# Patient Record
Sex: FEMALE | Race: BLACK OR AFRICAN AMERICAN | NOT HISPANIC OR LATINO | Employment: FULL TIME | URBAN - METROPOLITAN AREA
[De-identification: names, ages, dates, MRNs, and addresses within clinical notes are randomized per-mention and may not be internally consistent; named-entity substitution may affect disease eponyms.]

---

## 2017-04-12 ENCOUNTER — HOSPITAL ENCOUNTER (EMERGENCY)
Facility: HOSPITAL | Age: 62
Discharge: HOME | End: 2017-04-12
Attending: EMERGENCY MEDICINE
Payer: COMMERCIAL

## 2017-04-12 VITALS
SYSTOLIC BLOOD PRESSURE: 175 MMHG | DIASTOLIC BLOOD PRESSURE: 97 MMHG | TEMPERATURE: 98 F | WEIGHT: 171 LBS | OXYGEN SATURATION: 96 % | HEIGHT: 62 IN | RESPIRATION RATE: 18 BRPM | BODY MASS INDEX: 31.47 KG/M2 | HEART RATE: 70 BPM

## 2017-04-12 DIAGNOSIS — R06.02 SOB (SHORTNESS OF BREATH): ICD-10-CM

## 2017-04-12 DIAGNOSIS — I10 UNCONTROLLED HYPERTENSION: ICD-10-CM

## 2017-04-12 DIAGNOSIS — R06.02 SHORTNESS OF BREATH: Primary | ICD-10-CM

## 2017-04-12 DIAGNOSIS — J18.9 ATYPICAL PNEUMONIA: ICD-10-CM

## 2017-04-12 PROBLEM — R07.9 CHEST PAIN IN ADULT: Status: ACTIVE | Noted: 2017-04-12

## 2017-04-12 LAB
ALBUMIN SERPL BCP-MCNC: 3.7 G/DL
ALP SERPL-CCNC: 60 U/L
ALT SERPL W/O P-5'-P-CCNC: 24 U/L
ANION GAP SERPL CALC-SCNC: 9 MMOL/L
AST SERPL-CCNC: 22 U/L
BASOPHILS # BLD AUTO: 0.02 K/UL
BASOPHILS NFR BLD: 0.4 %
BILIRUB SERPL-MCNC: 0.6 MG/DL
BILIRUB UR QL STRIP: NEGATIVE
BNP SERPL-MCNC: 80 PG/ML
BUN SERPL-MCNC: 19 MG/DL
CALCIUM SERPL-MCNC: 10.2 MG/DL
CHLORIDE SERPL-SCNC: 102 MMOL/L
CLARITY UR: CLEAR
CO2 SERPL-SCNC: 29 MMOL/L
COLOR UR: NORMAL
CREAT SERPL-MCNC: 1.1 MG/DL
DIFFERENTIAL METHOD: NORMAL
EOSINOPHIL # BLD AUTO: 0.1 K/UL
EOSINOPHIL NFR BLD: 2.7 %
ERYTHROCYTE [DISTWIDTH] IN BLOOD BY AUTOMATED COUNT: 13.6 %
EST. GFR  (AFRICAN AMERICAN): >60 ML/MIN/1.73 M^2
EST. GFR  (NON AFRICAN AMERICAN): 54 ML/MIN/1.73 M^2
GLUCOSE SERPL-MCNC: 81 MG/DL
GLUCOSE UR QL STRIP: NEGATIVE
HCT VFR BLD AUTO: 40.7 %
HGB BLD-MCNC: 13.5 G/DL
HGB UR QL STRIP: NEGATIVE
INR PPP: 1
KETONES UR QL STRIP: NEGATIVE
LEUKOCYTE ESTERASE UR QL STRIP: NEGATIVE
LYMPHOCYTES # BLD AUTO: 1.4 K/UL
LYMPHOCYTES NFR BLD: 28.9 %
MCH RBC QN AUTO: 29.3 PG
MCHC RBC AUTO-ENTMCNC: 33.2 %
MCV RBC AUTO: 88 FL
MICROSCOPIC COMMENT: NORMAL
MONOCYTES # BLD AUTO: 0.6 K/UL
MONOCYTES NFR BLD: 12.2 %
NEUTROPHILS # BLD AUTO: 2.7 K/UL
NEUTROPHILS NFR BLD: 55.8 %
NITRITE UR QL STRIP: NEGATIVE
PH UR STRIP: 6 [PH] (ref 5–8)
PLATELET # BLD AUTO: 256 K/UL
PMV BLD AUTO: 12 FL
POTASSIUM SERPL-SCNC: 4 MMOL/L
PROT SERPL-MCNC: 8 G/DL
PROT UR QL STRIP: NEGATIVE
PROTHROMBIN TIME: 10.6 SEC
RBC # BLD AUTO: 4.61 M/UL
RBC #/AREA URNS HPF: 1 /HPF (ref 0–4)
SODIUM SERPL-SCNC: 140 MMOL/L
SP GR UR STRIP: 1.01 (ref 1–1.03)
TROPONIN I SERPL DL<=0.01 NG/ML-MCNC: <0.006 NG/ML
URN SPEC COLLECT METH UR: NORMAL
UROBILINOGEN UR STRIP-ACNC: NEGATIVE EU/DL
WBC # BLD AUTO: 4.84 K/UL

## 2017-04-12 PROCEDURE — G0378 HOSPITAL OBSERVATION PER HR: HCPCS

## 2017-04-12 PROCEDURE — 85025 COMPLETE CBC W/AUTO DIFF WBC: CPT

## 2017-04-12 PROCEDURE — 96374 THER/PROPH/DIAG INJ IV PUSH: CPT

## 2017-04-12 PROCEDURE — 85610 PROTHROMBIN TIME: CPT

## 2017-04-12 PROCEDURE — 99284 EMERGENCY DEPT VISIT MOD MDM: CPT | Mod: 25

## 2017-04-12 PROCEDURE — 81000 URINALYSIS NONAUTO W/SCOPE: CPT

## 2017-04-12 PROCEDURE — 93005 ELECTROCARDIOGRAM TRACING: CPT

## 2017-04-12 PROCEDURE — 25500020 PHARM REV CODE 255: Performed by: EMERGENCY MEDICINE

## 2017-04-12 PROCEDURE — 25000003 PHARM REV CODE 250: Performed by: EMERGENCY MEDICINE

## 2017-04-12 PROCEDURE — 80053 COMPREHEN METABOLIC PANEL: CPT

## 2017-04-12 PROCEDURE — 84484 ASSAY OF TROPONIN QUANT: CPT

## 2017-04-12 PROCEDURE — 83880 ASSAY OF NATRIURETIC PEPTIDE: CPT

## 2017-04-12 RX ORDER — LABETALOL HYDROCHLORIDE 5 MG/ML
10 INJECTION, SOLUTION INTRAVENOUS
Status: COMPLETED | OUTPATIENT
Start: 2017-04-12 | End: 2017-04-12

## 2017-04-12 RX ORDER — LISINOPRIL 20 MG/1
40 TABLET ORAL DAILY
Status: CANCELLED | OUTPATIENT
Start: 2017-04-13

## 2017-04-12 RX ORDER — ONDANSETRON 2 MG/ML
4 INJECTION INTRAMUSCULAR; INTRAVENOUS EVERY 8 HOURS PRN
Status: CANCELLED | OUTPATIENT
Start: 2017-04-12

## 2017-04-12 RX ORDER — MORPHINE SULFATE 10 MG/ML
2 INJECTION INTRAMUSCULAR; INTRAVENOUS; SUBCUTANEOUS EVERY 4 HOURS PRN
Status: CANCELLED | OUTPATIENT
Start: 2017-04-12

## 2017-04-12 RX ORDER — ALBUTEROL SULFATE 90 UG/1
2 AEROSOL, METERED RESPIRATORY (INHALATION) EVERY 4 HOURS PRN
Qty: 1 INHALER | Refills: 0 | Status: SHIPPED | OUTPATIENT
Start: 2017-04-12 | End: 2018-01-22

## 2017-04-12 RX ORDER — METOPROLOL SUCCINATE 25 MG/1
25 TABLET, EXTENDED RELEASE ORAL DAILY
Status: CANCELLED | OUTPATIENT
Start: 2017-04-13

## 2017-04-12 RX ORDER — SODIUM CHLORIDE 0.9 % (FLUSH) 0.9 %
3 SYRINGE (ML) INJECTION EVERY 8 HOURS PRN
Status: CANCELLED | OUTPATIENT
Start: 2017-04-12

## 2017-04-12 RX ORDER — ASPIRIN 325 MG
325 TABLET ORAL DAILY
Status: CANCELLED | OUTPATIENT
Start: 2017-04-13

## 2017-04-12 RX ORDER — NAPROXEN SODIUM 220 MG/1
81 TABLET, FILM COATED ORAL DAILY
Status: DISCONTINUED | OUTPATIENT
Start: 2017-04-13 | End: 2017-04-12

## 2017-04-12 RX ORDER — AZITHROMYCIN 500 MG/1
500 TABLET, FILM COATED ORAL DAILY
Qty: 5 TABLET | Refills: 0 | Status: SHIPPED | OUTPATIENT
Start: 2017-04-12 | End: 2017-04-17

## 2017-04-12 RX ADMIN — LABETALOL HYDROCHLORIDE 10 MG: 5 INJECTION, SOLUTION INTRAVENOUS at 05:04

## 2017-04-12 RX ADMIN — IOHEXOL 75 ML: 350 INJECTION, SOLUTION INTRAVENOUS at 04:04

## 2017-04-12 NOTE — ED TRIAGE NOTES
Pt complains of Shortness of Breath (Pt. States shortness of breath began this morning. Pt. states pulse ox reading was 79% about 0130 this am. Denies any pain).  Pt reports red/pink sputum this morning when she was coughing.

## 2017-04-12 NOTE — ED PROVIDER NOTES
Encounter Date: 4/12/2017    SCRIBE #1 NOTE: I, Chris Fowler, am scribing for, and in the presence of,  Refugio Chahal MD. I have scribed the following portions of the note - Other sections scribed: HPI, ROS.       History     Chief Complaint   Patient presents with    Shortness of Breath     Pt. c/o shortness of breath that began this morning. Pt. states pulse ox reading was 79% at one point in the day. Denies any pain.      Review of patient's allergies indicates:   Allergen Reactions    Sulfa (sulfonamide antibiotics) Itching and Rash     Other reaction(s): Rash     HPI Comments: CC: SOB    HPI: This 62 y.o. female with a PMHx of HTN, arthritis, blood transfusion presents to the ED c/o acute onset SOB and coughing up blood since this morning. Pt states she awoke this morning coughing up blood and SOB for 2 hours. Pt states after this episode she had a sore throat. Pt states the coughing and SOB has resolved but the sore throat remains. Pt also states her pulse oxygen saturation was lowered to 79% and hovered in the 80% for about 1 hour. No attempted at home medication. No other symptoms reported. Pt denies nausea, vomiting, diarrhea, headache, fever, chills, chest pain, abdominal pain, or any other associated symptoms.       The history is provided by the patient.     Past Medical History:   Diagnosis Date    Arthritis     Blood transfusion     Hypertension      Past Surgical History:   Procedure Laterality Date    Breast reduction      EYE SURGERY      Lasik bilat eyes    HYSTERECTOMY      LASER LAPAROSCOPY  1988    MINI-LAPAROTOMY  1988     Family History   Problem Relation Age of Onset    Arthritis Mother     Heart disease Mother     Hypertension Mother     Heart disease Father     Hypertension Father     Stroke Father     Diabetes Sister     Hypertension Sister     Hypertension Sister     Eczema Other     Arthritis Maternal Grandmother     Heart disease Maternal Grandmother      Hypertension Maternal Grandmother     Arthritis Maternal Grandfather     Arthritis Paternal Grandmother     Heart disease Paternal Grandmother     Arthritis Paternal Grandfather     Ovarian cancer Neg Hx     Breast cancer Neg Hx     Anxiety disorder Neg Hx     Depression Neg Hx     Suicide Neg Hx      Social History   Substance Use Topics    Smoking status: Never Smoker    Smokeless tobacco: None    Alcohol use Yes      Comment: RARELY     Review of Systems   Constitutional: Negative for chills and fever.   HENT: Positive for sore throat. Negative for congestion.    Eyes: Negative for visual disturbance.   Respiratory: Positive for cough (bloody sputum) and shortness of breath.    Cardiovascular: Negative for chest pain.   Gastrointestinal: Negative for abdominal pain, diarrhea, nausea and vomiting.   Genitourinary: Negative for dysuria.   Musculoskeletal: Negative for back pain.   Skin: Negative for rash.   Neurological: Negative for weakness and headaches.   All other systems reviewed and are negative.      Physical Exam   Initial Vitals   BP Pulse Resp Temp SpO2   04/12/17 1442 04/12/17 1442 04/12/17 1442 04/12/17 1442 04/12/17 1442   220/98 67 17 97.7 °F (36.5 °C) 95 %     Physical Exam  Nursing note and vitals reviewed.  Constitutional: Well appearing middle-aged female in no obvious distress or discomfort  HENT:    Head: NC/AT    Eyes: Conjunctivae normal.   (-) scleral icterus.              Mouth/Throat: MMM.     Neck: Neck supple, normal rom.   Cardiovascular: RRR  Pulmonary/Chest: CTAB   Abdominal: Soft. ND/NT w/o guarding or rebound.  (-) CVA tenderness.  Musculoskeletal: FROM of all major joints. No extremity edema or tenderness.  Neurological: A&Ox4, Normal speech.  No focal motor deficits.  Normal gait  Skin: Skin is warm and dry.   Psychiatric: normal mood and affect.      ED Course   Procedures  Labs Reviewed   COMPREHENSIVE METABOLIC PANEL - Abnormal; Notable for the following:         Result Value    eGFR if non  54 (*)     All other components within normal limits   CBC W/ AUTO DIFFERENTIAL   PROTIME-INR   TROPONIN I   B-TYPE NATRIURETIC PEPTIDE   URINALYSIS   URINALYSIS MICROSCOPIC     EKG Readings: (Independently Interpreted)   Initial Reading: No STEMI.   Sinus rhythm with occasional PVC, rate 86, normal axis/intervals, no ST/T-wave abnormalities       X-Rays:   Independently Interpreted Readings:   Other Readings:  Chest x-ray - increased interstitial markings bilaterally which may represent interstitial infiltrates vs chronic interstitial fibrosis.    CTA chest - Negative PE study.    Medical Decision Making:   History:   I obtained history from: someone other than patient.  Old Medical Records: I decided to obtain old medical records.  Old Records Summarized: records from clinic visits and other records.  Independently Interpreted Test(s):   I have ordered and independently interpreted X-rays - see prior notes.  I have ordered and independently interpreted EKG Reading(s) - see prior notes  Clinical Tests:   Lab Tests: Ordered and Reviewed  Radiological Study: Ordered and Reviewed  Medical Tests: Ordered and Reviewed      Differential Diagnosis:   Initial differential diagnosis includes but is not limited to...URI, bronchitis, pneumonia, pneumothorax, asthma vs reactive airway disease, Pulmonary embolism, undiagnosed COPD vs CHF.     Additional Medical Decision Making:   Emergent evaluation of a 62-year-old female with history of hypertension and arthritis who presents emergency Department complaining of shortness of breath, reported hypoxia and productive cough including blood-streaked pinkish sputum since this morning.  Patient initially seen by ENT, Dr. Rosado, who had a concern for possible PE referred her to the emergency department.  Initial vitals concerning for hypertension along with an SPO2 of 95% RA.  On exam, she appears well and in no obvious distress or  discomfort.  Cardiac and respiratory exams benign.  Her abdomen is soft and nontender.  EKG without evidence of acute ischemia or dysrhythmia.  Chest x-ray without focal consolidation/pneumonia, pleural effusion or pneumothorax.  Basic labs within normal limits.  Troponin negative.  BNP less than 100.  CTA chest pending.    - CTA chest Without evidence of PE.  There is however numerous micronodular opacities bilaterally with interstitial thickening suggestive of old granulomatosis disease vs interstitial pneumonia.  Given her presenting symptoms, she has been empirically started on azithromycin.  She has been provided an albuterol inhaler to be used as needed.  She has been strongly advised follow-up with her PCP within 3-5 days for reevaluation and further management.  Return instructions discussed prior to discharge.            Scribe Attestation:   Scribe #1: I performed the above scribed service and the documentation accurately describes the services I performed. I attest to the accuracy of the note.    Attending Attestation:           Physician Attestation for Scribe:  Physician Attestation Statement for Scribe #1: I, Refugio Chahal MD, reviewed documentation, as scribed by Chris Fowler  in my presence, and it is both accurate and complete.                 ED Course     Clinical Impression:   The primary encounter diagnosis was Shortness of breath. Diagnoses of SOB (shortness of breath), Atypical pneumonia, and Uncontrolled hypertension were also pertinent to this visit.    Disposition:   Disposition: Discharged       Refugio Chahal MD  04/14/17 0006       Refugio Chahal MD  04/14/17 0008

## 2017-04-12 NOTE — ED AVS SNAPSHOT
[facility]  [address] [place] [address]               Riley Marcus   2017  3:04 PM   ED    Description:  Female : 1955   Department:  [facility]           Your Care was Coordinated By:     Provider Role From To    Refugio Chahal MD Attending Provider 17 1526 --    Jacinto Recinos PA-C Physician Assistant 17 [phone]      Reason for Visit     Shortness of Breath           Diagnoses this Visit        Comments    Shortness of breath    -  Primary     SOB (shortness of breath)         Atypical pneumonia         Uncontrolled hypertension           ED Disposition     ED Disposition Condition Comment    Discharge             To Do List           Follow-up Information     Schedule an appointment as soon as possible for a visit with Chana Sutton MD.    Specialty:  Internal Medicine    Contact information:    [address] [place] [address]  [phone]         These Medications        Disp Refills Start End    azithromycin (ZITHROMAX) 500 MG tablet 5 tablet 0 2017    Take 1 tablet (500 mg total) by mouth once daily. - Oral    Pharmacy: [facility] - [place], [place] - [address] Ph #: [phone]       albuterol 90 mcg/actuation inhaler 1 Inhaler 0 2017    Inhale 2 puffs into the lungs every 4 (four) hours as needed for Wheezing or Shortness of Breath. Rescue - Inhalation    Pharmacy: [facility] - [place], [place] - [address] Ph #: [phone]         [facility]     [facility] Nurse Care Line - 24/ Assistance  Unless otherwise directed by your provider, please contact [facility], our nurse care line that is available for 24/ assistance.     Registered nurses in the [facility] provide: appointment scheduling, clinical advisement, health education, and other advisory  services.  Call: [phone] (toll free)               Medications           Message regarding Medications     Verify the changes and/or additions to your medication regime listed below are the same as discussed with your clinician today.  If any of these changes or additions are incorrect, please notify your healthcare provider.        START taking these NEW medications        Refills    azithromycin (ZITHROMAX) 500 MG tablet 0    Sig: Take 1 tablet (500 mg total) by mouth once daily.    Class: Print    Route: Oral    albuterol 90 mcg/actuation inhaler 0    Sig: Inhale 2 puffs into the lungs every 4 (four) hours as needed for Wheezing or Shortness of Breath. Rescue    Class: Print    Route: Inhalation      These medications were administered today        Dose Freq    omnipaque 350 iohexol 75 mL 75 mL IMG once as needed    Sig: Inject 75 mLs into the vein ONCE PRN for contrast.    Class: Normal    Route: Intravenous    labetalol injection 10 mg 10 mg ED 1 Time    Sig: Inject 2 mLs (10 mg total) into the vein ED 1 Time.    Class: Normal    Route: Intravenous           Verify that the below list of medications is an accurate representation of the medications you are currently taking.  If none reported, the list may be blank. If incorrect, please contact your healthcare provider. Carry this list with you in case of emergency.           Current Medications     ascorbic acid (VITAMIN C) 500 MG tablet Take 500 mg by mouth once daily.      calcium-vitamin D3 (CALCIUM 500 + D) 500 mg(1,250mg) -200 unit per tablet Take 1 tablet by mouth 2 (two) times daily with meals.    cyanocobalamin-cobamamide (B-12 PLUS) 5,000-100 mcg Subl Place under the tongue. 1 Tablet, Sublingual Sublingual Every day    GLUC MAURICE/CHONDRO MAURICE A/VIT C/MN (GLUCOSAMINE-CHONDROIT-VIT C-MN) 163-768-52-5 mg Tab Take by mouth. 1 Tablet Oral Every day    green tea leaf extract (GREEN TEA) 315 mg Cap Take by mouth. 1 Capsule Oral Every day    lisinopril  "(PRINIVIL,ZESTRIL) 40 MG tablet Take 1 tablet (40 mg total) by mouth once daily.    metoprolol succinate (TOPROL-XL) 25 MG 24 hr tablet Take 1 tablet (25 mg total) by mouth once daily.    multivitamin with minerals tablet Take 1 tablet by mouth once daily.    omega-3 fatty acids 1,000 mg Cap Take by mouth. 1 Capsule Oral Every day    triamterene-hydrochlorothiazide 37.5-25 mg (DYAZIDE) 37.5-25 mg per capsule Take 1 capsule by mouth once daily.    VITAMIN E,DL-ALPHA TOCOPHEROL, (VITAMIN E, BULK, MISC) by Misc.(Non-Drug; Combo Route) route.    albuterol 90 mcg/actuation inhaler Inhale 2 puffs into the lungs every 4 (four) hours as needed for Wheezing or Shortness of Breath. Rescue    aspirin 81 mg Tab Take by mouth. 1 Tablet Oral Every day    azithromycin (ZITHROMAX) 500 MG tablet Take 1 tablet (500 mg total) by mouth once daily.    predniSONE (DELTASONE) 5 MG tablet 1 mg 3 times per week           Clinical Reference Information           Your Vitals Were     BP Pulse Temp Resp Height Weight    194/110 76 98.2 °F (36.8 °C) (Oral) 16 5' 2" (1.575 m) 77.6 kg (171 lb)    SpO2 BMI             94% 31.28 kg/m2         Allergies as of 4/12/2017        Reactions    Sulfa (Sulfonamide Antibiotics) Itching, Rash    Other reaction(s): Rash      Immunizations Administered on Date of Encounter - 4/12/2017     None      ED Micro, Lab, POCT     Start Ordered       Status Ordering Provider    04/12/17 1449 04/12/17 1448  CBC auto differential  STAT      Final result     04/12/17 1449 04/12/17 1448  Comprehensive metabolic panel  STAT      Final result     04/12/17 1449 04/12/17 1448  Protime-INR  STAT      Final result     04/12/17 1449 04/12/17 1448  Troponin I  STAT      Final result     04/12/17 1449 04/12/17 1448  Brain natriuretic peptide  STAT      Final result     04/12/17 1449 04/12/17 1448  Urinalysis  STAT      Final result     04/12/17 1449 04/12/17 1449  Urinalysis Microscopic  Once      Final result       ED Imaging " Orders     Start Ordered       Status Ordering Provider    04/12/17 1549 04/12/17 1549  CTA Chest Non-Coronary (PE Study)  1 time imaging      Final result     04/12/17 1511 04/12/17 1511  X-Ray Chest PA And Lateral  1 time imaging      Final result       Discharge References/Attachments     ADULT, PNEUMONIA (ENGLISH)       [facility] complies with applicable Federal civil rights laws and does not discriminate on the basis of race, color, national origin, age, disability, or sex.        Language Assistance Services     ATTENTION: Language assistance services are available, free of charge. Please call [phone].      ATENCIÓN: Si habla español, tiene a owusu disposición servicios gratuitos de asistencia lingüística. Llame al [phone].     CHÚ Ý: N?u b?n nói Ti?ng Vi?t, có các d?ch v? h? tr? ngôn ng? mi?n phí dành cho b?n. G?i s? [phone].

## 2017-04-12 NOTE — ED TRIAGE NOTES
Present to the ER with c/o SOB, reports cough since this am, reports sore throat from cough, reports low oxygen sat's while coughing and after coughing, currently denies SOB

## 2017-07-17 ENCOUNTER — LAB VISIT (OUTPATIENT)
Dept: LAB | Facility: HOSPITAL | Age: 62
End: 2017-07-17
Attending: ORTHOPAEDIC SURGERY
Payer: COMMERCIAL

## 2017-07-17 ENCOUNTER — TELEPHONE (OUTPATIENT)
Dept: FAMILY MEDICINE | Facility: CLINIC | Age: 62
End: 2017-07-17

## 2017-07-17 DIAGNOSIS — M79.674 PAIN AND SWELLING OF TOE, RIGHT: Primary | ICD-10-CM

## 2017-07-17 DIAGNOSIS — Z11.59 NEED FOR HEPATITIS C SCREENING TEST: ICD-10-CM

## 2017-07-17 DIAGNOSIS — M79.89 PAIN AND SWELLING OF TOE, RIGHT: Primary | ICD-10-CM

## 2017-07-17 LAB
ALBUMIN SERPL BCP-MCNC: 3.6 G/DL
ALP SERPL-CCNC: 73 U/L
ALT SERPL W/O P-5'-P-CCNC: 17 U/L
ANION GAP SERPL CALC-SCNC: 10 MMOL/L
AST SERPL-CCNC: 18 U/L
BILIRUB SERPL-MCNC: 0.4 MG/DL
BUN SERPL-MCNC: 20 MG/DL
CALCIUM SERPL-MCNC: 10.4 MG/DL
CHLORIDE SERPL-SCNC: 105 MMOL/L
CO2 SERPL-SCNC: 27 MMOL/L
CREAT SERPL-MCNC: 1.3 MG/DL
EST. GFR  (AFRICAN AMERICAN): 51 ML/MIN/1.73 M^2
EST. GFR  (NON AFRICAN AMERICAN): 44 ML/MIN/1.73 M^2
GLUCOSE SERPL-MCNC: 94 MG/DL
POTASSIUM SERPL-SCNC: 4.3 MMOL/L
PROT SERPL-MCNC: 7.9 G/DL
SODIUM SERPL-SCNC: 142 MMOL/L
URATE SERPL-MCNC: 6.2 MG/DL

## 2017-07-17 PROCEDURE — 84550 ASSAY OF BLOOD/URIC ACID: CPT

## 2017-07-17 PROCEDURE — 80053 COMPREHEN METABOLIC PANEL: CPT

## 2017-07-17 PROCEDURE — 86803 HEPATITIS C AB TEST: CPT

## 2017-07-17 PROCEDURE — 36415 COLL VENOUS BLD VENIPUNCTURE: CPT

## 2017-07-17 NOTE — TELEPHONE ENCOUNTER
----- Message from Tatiana Neely sent at 7/17/2017 12:41 PM CDT -----  Contact: self  [phone]  Pt si requesting a lab order for her uric acid, Pls call pt [phone]. Thanks............Madalyn

## 2017-07-18 LAB — HCV AB SERPL QL IA: NEGATIVE

## 2017-08-01 DIAGNOSIS — M79.674 PAIN IN TOE OF RIGHT FOOT: Primary | ICD-10-CM

## 2017-08-09 ENCOUNTER — HOSPITAL ENCOUNTER (OUTPATIENT)
Dept: RADIOLOGY | Facility: HOSPITAL | Age: 62
Discharge: HOME | End: 2017-08-09
Attending: ORTHOPAEDIC SURGERY
Payer: COMMERCIAL

## 2017-08-09 DIAGNOSIS — M79.674 PAIN IN TOE OF RIGHT FOOT: ICD-10-CM

## 2017-08-09 PROCEDURE — 73718 MRI LOWER EXTREMITY W/O DYE: CPT | Mod: TC,RT

## 2017-08-09 PROCEDURE — 73718 MRI LOWER EXTREMITY W/O DYE: CPT | Mod: 26,RT,, | Performed by: RADIOLOGY

## 2017-12-07 ENCOUNTER — TELEPHONE (OUTPATIENT)
Dept: OPHTHALMOLOGY | Facility: CLINIC | Age: 62
End: 2017-12-07

## 2017-12-07 NOTE — TELEPHONE ENCOUNTER
----- Message from Samantha Burnett sent at 12/7/2017 10:55 AM CST -----  Contact: [facility]  OPHTHALMOLOGY TRIAGE CALL    Date:  12/7/2017   Time:  10:55 AM  Person Calling: Emelia  Phone Number:  [phone] (home) [phone] (work)  Call received by:  Samantha Burnett  Patient in Clinic now:  No  Which eye:  Left  Name of Patient's Eye Dr.:    Date Last Seen:    Disposition of patient:the pt is experiencing blurry vision in the left eye  For How Long:for the last two days    Additional Comments:  The patient is requesting to see Dr. Givens

## 2017-12-08 ENCOUNTER — OFFICE VISIT (OUTPATIENT)
Dept: OPTOMETRY | Facility: CLINIC | Age: 62
End: 2017-12-08
Payer: COMMERCIAL

## 2017-12-08 DIAGNOSIS — I10 ESSENTIAL HYPERTENSION: ICD-10-CM

## 2017-12-08 DIAGNOSIS — H34.8320 BRANCH RETINAL VEIN OCCLUSION WITH MACULAR EDEMA OF LEFT EYE: Primary | ICD-10-CM

## 2017-12-08 DIAGNOSIS — H25.13 NUCLEAR SCLEROSIS OF BOTH EYES: ICD-10-CM

## 2017-12-08 PROCEDURE — 92004 COMPRE OPH EXAM NEW PT 1/>: CPT | Mod: S$GLB,,, | Performed by: OPTOMETRIST

## 2017-12-08 PROCEDURE — 99999 PR PBB SHADOW E&M-EST. PATIENT-LVL II: CPT | Mod: PBBFAC,,, | Performed by: OPTOMETRIST

## 2017-12-08 PROCEDURE — 92134 CPTRZ OPH DX IMG PST SGM RTA: CPT | Mod: S$GLB,,, | Performed by: OPTOMETRIST

## 2017-12-08 NOTE — PROGRESS NOTES
Subjective:       Patient ID: Riley Marcus is a 62 y.o. female      Chief Complaint   Patient presents with    Eye Problem    Hypertensive Eye Exam     History of Present Illness  Dls: ? Yrs     Pt states x 3 days notice bottom half of vision in os is blurrysome pain   1-2 off/on x 1 -2 days.  Pt states no floaters no flashes no ha's.     Eye meds:  None       Assessment/Plan:     1. Branch retinal vein occlusion with macular edema of left eye  Discussed with pt. Referral to Dr. Olson for evaluation and treatment.       - Posterior Segment OCT Retina-Both eyes  - Ambulatory referral to Ophthalmology    2. Essential hypertension  Maintain BP control.    3. Nuclear sclerosis of both eyes  NVS. Monitor.     Return for Retina consult with Dr. Olson.

## 2017-12-21 ENCOUNTER — INITIAL CONSULT (OUTPATIENT)
Dept: OPHTHALMOLOGY | Facility: CLINIC | Age: 62
End: 2017-12-21
Payer: COMMERCIAL

## 2017-12-21 DIAGNOSIS — H25.13 NUCLEAR SCLEROSIS OF BOTH EYES: ICD-10-CM

## 2017-12-21 DIAGNOSIS — H34.8320 BRANCH RETINAL VEIN OCCLUSION OF LEFT EYE WITH MACULAR EDEMA: Primary | ICD-10-CM

## 2017-12-21 PROCEDURE — 67028 INJECTION EYE DRUG: CPT | Mod: LT,S$GLB,, | Performed by: OPHTHALMOLOGY

## 2017-12-21 PROCEDURE — 92134 CPTRZ OPH DX IMG PST SGM RTA: CPT | Mod: S$GLB,,, | Performed by: OPHTHALMOLOGY

## 2017-12-21 PROCEDURE — 92014 COMPRE OPH EXAM EST PT 1/>: CPT | Mod: 25,S$GLB,, | Performed by: OPHTHALMOLOGY

## 2017-12-21 PROCEDURE — 99999 PR PBB SHADOW E&M-EST. PATIENT-LVL II: CPT | Mod: PBBFAC,,, | Performed by: OPHTHALMOLOGY

## 2017-12-21 RX ADMIN — Medication 1.25 MG: at 09:12

## 2017-12-21 NOTE — PATIENT INSTRUCTIONS
.POST INTRAVITREAL INJECTION PATIENT INSTRUCTIONS   Below are some guidelines for what to expect after your treatment and additional care instructions.   * you may experience mild discomfort in your eye after the treatment. Your eye usually feels better within 24 to 48 hours after the procedure.   * you have been given drops that numb the surface of your eye.   DO NOT rub or touch your eye, DO NOT wear contacts until numbness goes away.   * you may experience small spots that appear in your field of vision, these are usually caused by an air bubble or from the medication. It taked a few hours or days for these to go away.   * use of sunglasses will help reduce light sensitivity and glare.   * DO NOT swim or put sink water ( tap water ) or swin for at least 24 hours after the injection   * If you have a gritty, burning, irritating or stinging feeling in the injected eye. This may be a result of the antiseptic used. Use artifical tears or eye lubricant ( from over- the- counter from your local pharmacy ). If you have some at home already please check the expiration date, so not to use anything contaminated in your eye. A cool pack over your eye brow above the injected eye may also relieve discomfort.   Call us right away or go to the Emergency Department if you have a dramatic decrease in vision or extreme pain in the eye.   [facility] [phone]    .Controlling High Blood Pressure  High blood pressure (hypertension) is often called the silent killer. This is because many people who have it dont know it. High blood pressure is defined as 140/90 mm Hg or higher. Know your blood pressure and remember to check it regularly. Doing so can save your life. Here are some things you can do to help control your blood pressure.    Choose heart-healthy foods  · Select low-salt, low-fat foods. Limit sodium intake to 2,400 mg per day or the amount suggested by your healthcare provider.  · Limit canned, dried,  cured, packaged, and fast foods. These can contain a lot of salt.  · Eat 8 to 10 servings of fruits and vegetables every day.  · Choose lean meats, fish, or chicken.  · Eat whole-grain pasta, brown rice, and beans.  · Eat 2 to 3 servings of low-fat or fat-free dairy products.  · Ask your doctor about the DASH eating plan. This plan helps reduce blood pressure.  · When you go to a restaurant, ask that your meal be prepared with no added salt.  Maintain a healthy weight  · Ask your healthcare provider how many calories to eat a day. Then stick to that number.  · Ask your healthcare provider what weight range is healthiest for you. If you are overweight, a weight loss of only 3% to 5% of your body weight can help lower blood pressure. Generally, a good weight loss goal is to lose 10% of your body weight in a year.  · Limit snacks and sweets.  · Get regular exercise.  Get up and get active  · Choose activities you enjoy. Find ones you can do with friends or family. This includes bicycling, dancing, walking, and jogging.  · Park farther away from building entrances.  · Use stairs instead of the elevator.  · When you can, walk or bike instead of driving.  · [place] leaves, garden, or do household repairs.  · Be active at a moderate to vigorous level of physical activity for at least 40 minutes for a minimum of 3 to 4 days a week.   Manage stress  · Make time to relax and enjoy life. Find time to laugh.  · Communicate your concerns with your loved ones and your healthcare provider.  · Visit with family and friends, and keep up with hobbies.  Limit alcohol and quit smoking  · Men should have no more than 2 drinks per day.  · Women should have no more than 1 drink per day.  · Talk with your healthcare provider about quitting smoking. Smoking significantly increases your risk for heart disease and stroke. Ask your healthcare provider about community smoking cessation programs and other options.  Medicines  If lifestyle changes  arent enough, your healthcare provider may prescribe high blood pressure medicine. Take all medicines as prescribed. If you have any questions about your medicines, ask your healthcare provider before stopping or changing them.    © 2150-7539 [employer]. [address] [place] [address], [place] [address]. All rights reserved. This information is not intended as a substitute for professional medical care. Always follow your healthcare professional's instructions.

## 2017-12-21 NOTE — LETTER
December 21, 2017      Jamilah Bloom, OD  [address]  [facility] [address]           [facility]  [address]  [facility] [address]  Phone: [phone]  Fax: [phone]          Patient: Riley Marcus   MR Number: [id number]   YOB: 1955   Date of Visit: 12/21/2017       Dear Dr. Jamilah Bloom:    Thank you for referring Riley Marcus to me for evaluation. Attached you will find relevant portions of my assessment and plan of care.    If you have questions, please do not hesitate to call me. I look forward to following Riley Marcus along with you.    Sincerely,    Harpreet Olson MD    Enclosure  CC:  No Recipients    If you would like to receive this communication electronically, please contact [email or web address] or [phone] to request more information on [employer] access.    For providers and/or their staff who would like to refer a patient to [facility], please contact us through our one-stop-shop provider referral line, [facility], at [phone].    If you feel you have received this communication in error or would no longer like to receive these types of communications, please e-mail [email or web address]

## 2017-12-21 NOTE — PROGRESS NOTES
HPI     DLS 12/08/17  By Dr. MATTHEW LOWE, OD    61 Y/O F presents today per referral by Dr. LOWE, OD for BRVO w/ ME OS. PT   c/o blurred vision and a few floaters. Onset x 12/04/1, stable since   onset.  No flashes OU.  Va normal OD.  No symptoms OD.  No pain OU.    -eyepain  -headache x 1 day  +light flashes/floaters      Last edited by Harpreet Olson MD on 12/21/2017  8:55 AM. (History)        [facility] SDOCT:   OD: good quality, good contour  OS: good quality, superior IRF/ME/thickening      Assessment /Plan     For exam results, see Encounter Report.    Branch retinal vein occlusion of left eye with macular edema  -     Cancel: OCT- Retina  -     Posterior Segment OCT Retina-Both eyes    Nuclear sclerosis of both eyes      Recommend Avastin OS.  RBA discussed.  Monthly inj discussed.  Pt agrees.    Risks, benefits, and alternatives to treatment were discussed in detail with the patient, including bleeding/infection (endophthalmitis)/etc.  The patient voiced understanding and wished to proceed with the procedure.  See separate consent form.    Injection Procedure Note:  Diagnosis: HRVO with ME Left Eye    Topical Proparacaine drop placed then topical 10% Betadine swabs to lids, lashes, and conjunctiva.  Sterile gloves used, and sterile lid speculum placed.  10% Betadine swabbed at injection site again prior to injection.  Avastin 1.25mg in 0.05cc Injected at 5:00 position 3.5-4mm posterior to the limbus.  Complications: None  Va at least CF at 5 feet post injection.  Retina, ONH, IOP normal after injection.    Return to clinic in 1 month for reevaluation, OCT, and possible injection depending upon findings.  Patient should return immediately PRN.  Retinal Detachment and Endophthalmitis precautions given.

## 2018-01-22 ENCOUNTER — PROCEDURE VISIT (OUTPATIENT)
Dept: OPHTHALMOLOGY | Facility: CLINIC | Age: 63
End: 2018-01-22
Payer: COMMERCIAL

## 2018-01-22 VITALS — DIASTOLIC BLOOD PRESSURE: 88 MMHG | SYSTOLIC BLOOD PRESSURE: 151 MMHG | HEART RATE: 67 BPM

## 2018-01-22 DIAGNOSIS — H34.8320 BRANCH RETINAL VEIN OCCLUSION OF LEFT EYE WITH MACULAR EDEMA: Primary | ICD-10-CM

## 2018-01-22 PROCEDURE — 99499 UNLISTED E&M SERVICE: CPT | Mod: S$GLB,,, | Performed by: OPHTHALMOLOGY

## 2018-01-22 PROCEDURE — 92134 CPTRZ OPH DX IMG PST SGM RTA: CPT | Mod: S$GLB,,, | Performed by: OPHTHALMOLOGY

## 2018-01-22 PROCEDURE — 67028 INJECTION EYE DRUG: CPT | Mod: LT,S$GLB,, | Performed by: OPHTHALMOLOGY

## 2018-01-22 RX ADMIN — Medication 1.25 MG: at 11:01

## 2018-01-22 NOTE — PROGRESS NOTES
HPI     1 mo BRVO f/u / OCT / Avastin   DLS-12/21/2017 Dr. Olson    Pt sts va has improved since last visit denies pain just stinging in eyes   occasionally thinks due to allergies.   (-)Flashes (+)Floaters  (-)Photophobia  (-)Glare    At's PRN      Branch retinal vein occlusion of left eye with macular edema  Avastin x1 12/21/2017 OS      Nuclear sclerosis of both eyes    Last edited by Amanda Vega on 1/22/2018  9:34 AM. (History)              [place] SDOCT:   OD: good quality, good contour, stable from Dec scan  OS: good quality, superior IRF/ME/thickening much improved since Dec.  Only min cystic change persists.  Foveal contour has returned.     SRF resolved    Assessment /Plan     For exam results, see Encounter Report.    Branch retinal vein occlusion of left eye with macular edema   improve about a month after Avastin inj      Recommend continue Avastin OS.  RBA discussed.  Monthly inj discussed.  Pt agrees.    Risks, benefits, and alternatives to treatment were discussed in detail with the patient, including bleeding/infection (endophthalmitis)/etc.  The patient voiced understanding and wished to proceed with the procedure.  See separate consent form.    Injection Procedure Note:  Diagnosis: HRVO with ME Left Eye    Topical Proparacaine drop placed then topical 10% Betadine swabs to lids, lashes, and conjunctiva.  Sterile gloves used, and sterile lid speculum placed.  10% Betadine swabbed at injection site again prior to injection.  Avastin 1.25mg in 0.05cc Injected at 5:00 position 3.5-4mm posterior to the limbus.  Complications: None  Va at least CF at 5 feet post injection.  Retina, ONH, IOP normal after injection.    Return to clinic in 1 month for reevaluation, OCT, and possible injection depending upon findings.  Patient should return immediately PRN.  Retinal Detachment and Endophthalmitis precautions given.

## 2018-01-22 NOTE — PATIENT INSTRUCTIONS
POST INTRAVITREAL INJECTION PATIENT INSTRUCTIONS   Below are some guidelines for what to expect after your treatment and additional care instructions.    You may experience mild discomfort in your eye after the treatment. Usually your eye feels better within 24 to 48 hours after the procedure.      You have been given drops that numb the surface of your eye. DO NOT rub or touch your eye. DO NOT wear contacts until numbness goes away.      You may experience small spots that appear in your field of vision. These are usually caused by an air bubble or from the medication. It takes a few hours or days for these to go away.      The use of sunglasses will help reduce light sensitivity and glare.      DO NOT swim or put sink water (tap water) in your eyes for at least 4 hours after the injection.      You may get a gritty, burning, irritating or stinging feeling in the injected eye as a result of the antiseptic used. Use artificial tears or eye lubricant to reduce the sensation. These are available over-the-counter from your local pharmacy. If you already have some at home, be sure to check the expiration date and to avoid contaminating your eye. A cool pack over your eye brow above the injected eye may also relieve discomfort.     Call us right away or go to the Emergency Department if you have a dramatic decrease in vision or extreme pain in the eye.   [facility] [phone]   Item: [id number]   Revised: 09/2015

## 2018-02-23 ENCOUNTER — TELEPHONE (OUTPATIENT)
Dept: OPHTHALMOLOGY | Facility: CLINIC | Age: 63
End: 2018-02-23

## 2018-02-26 ENCOUNTER — PROCEDURE VISIT (OUTPATIENT)
Dept: OPHTHALMOLOGY | Facility: CLINIC | Age: 63
End: 2018-02-26
Payer: COMMERCIAL

## 2018-02-26 VITALS — SYSTOLIC BLOOD PRESSURE: 129 MMHG | HEART RATE: 74 BPM | DIASTOLIC BLOOD PRESSURE: 81 MMHG

## 2018-02-26 DIAGNOSIS — H34.8320 BRANCH RETINAL VEIN OCCLUSION OF LEFT EYE WITH MACULAR EDEMA: ICD-10-CM

## 2018-02-26 PROCEDURE — 92134 CPTRZ OPH DX IMG PST SGM RTA: CPT | Mod: S$GLB,,, | Performed by: OPHTHALMOLOGY

## 2018-02-26 PROCEDURE — 67028 INJECTION EYE DRUG: CPT | Mod: LT,S$GLB,, | Performed by: OPHTHALMOLOGY

## 2018-02-26 PROCEDURE — 99499 UNLISTED E&M SERVICE: CPT | Mod: S$GLB,,, | Performed by: OPHTHALMOLOGY

## 2018-02-26 RX ADMIN — Medication 1.25 MG: at 09:02

## 2018-02-26 NOTE — PATIENT INSTRUCTIONS
POST INTRAVITREAL INJECTION PATIENT INSTRUCTIONS   Below are some guidelines for what to expect after your treatment and additional care instructions.    You may experience mild discomfort in your eye after the treatment. Usually your eye feels better within 24 to 48 hours after the procedure.      You have been given drops that numb the surface of your eye. DO NOT rub or touch your eye. DO NOT wear contacts until numbness goes away.      You may experience small spots that appear in your field of vision. These are usually caused by an air bubble or from the medication. It takes a few hours or days for these to go away.      The use of sunglasses will help reduce light sensitivity and glare.      DO NOT swim or put sink water (tap water) in your eyes for at least 4 hours after the injection.      You may get a gritty, burning, irritating or stinging feeling in the injected eye as a result of the antiseptic used. Use artificial tears or eye lubricant to reduce the sensation. These are available over-the-counter from your local pharmacy. If you already have some at home, be sure to check the expiration date and to avoid contaminating your eye. A cool pack over your eye brow above the injected eye may also relieve discomfort.     Call us right away or go to the Emergency Department if you have a dramatic decrease in vision or extreme pain in the eye.   [facility] [phone]   Item: [id number]   Revised: 09/2015

## 2018-02-26 NOTE — PROGRESS NOTES
HPI     DLS 01/22/18    63 Y/O F here today for here 1 mo BRVO OS w/ ME ck after   Avastin injection left eye. PT reports: some soreness after injection x 1   wk, but better now. I have been taking Ibuprofen for Arthritis. stj        POHx:   S/P Avastin OS x 1 (01/22/18)  1. BRVO OS w/ ME     Last edited by Sue Yanez MA on 2/26/2018  8:56 AM. (History)          [place] SDOCT:   OD: good quality, good contour, stable from Dec scan  OS: good quality, superior IRF/ME/thickening much improved since Dec, min improved since 1/22/18.  No fluid today    Assessment /Plan     For exam results, see Encounter Report.    Branch retinal vein occlusion of left eye with macular edema  No fluid today 5 wks after Avastin #2      Recommend continue Avastin OS.  RBA discussed.  Monthly inj discussed.  Pt agrees.    Risks, benefits, and alternatives to treatment were discussed in detail with the patient, including bleeding/infection (endophthalmitis)/etc.  The patient voiced understanding and wished to proceed with the procedure.  See separate consent form.    Injection Procedure Note:  Diagnosis: HRVO with ME Left Eye    Topical Proparacaine drop placed then topical 10% Betadine swabs to lids, lashes, and conjunctiva.  Sterile gloves used, and sterile lid speculum placed.  10% Betadine swabbed at injection site again prior to injection.  Avastin 1.25mg in 0.05cc Injected at 5:00 position 3.5-4mm posterior to the limbus.  Complications: None  Va at least CF at 5 feet post injection.  Retina, ONH, IOP normal after injection.    Return to clinic in 6 wks for reevaluation, OCT, and possible injection depending upon findings.  Patient should return immediately PRN.  Retinal Detachment and Endophthalmitis precautions given.

## 2018-04-09 ENCOUNTER — PROCEDURE VISIT (OUTPATIENT)
Dept: OPHTHALMOLOGY | Facility: CLINIC | Age: 63
End: 2018-04-09
Payer: COMMERCIAL

## 2018-04-09 VITALS — HEART RATE: 89 BPM | SYSTOLIC BLOOD PRESSURE: 158 MMHG | DIASTOLIC BLOOD PRESSURE: 98 MMHG

## 2018-04-09 DIAGNOSIS — H34.8320 BRANCH RETINAL VEIN OCCLUSION OF LEFT EYE WITH MACULAR EDEMA: ICD-10-CM

## 2018-04-09 PROCEDURE — 67028 INJECTION EYE DRUG: CPT | Mod: LT,S$GLB,, | Performed by: OPHTHALMOLOGY

## 2018-04-09 PROCEDURE — 92134 CPTRZ OPH DX IMG PST SGM RTA: CPT | Mod: S$GLB,,, | Performed by: OPHTHALMOLOGY

## 2018-04-09 PROCEDURE — 99499 UNLISTED E&M SERVICE: CPT | Mod: S$GLB,,, | Performed by: OPHTHALMOLOGY

## 2018-04-09 RX ADMIN — Medication 1.25 MG: at 10:04

## 2018-04-09 NOTE — PROGRESS NOTES
HPI     BRVO 6 wk  / OCT/ Avastin  DLS- 02/26/2018 Dr. Olson    Pt sts vision improved since last visit.   Denies pain   (-)Flashes (+)Floaters only after injections   (-)Photophobia  (-)Glare      AT's PRN     POHx:   S/P Avastin OS x 2 (02/26/2018)  1. BRVO OS w/ ME     Last edited by Amanda Vega on 4/9/2018  9:14 AM. (History)          [place] SDOCT:   OD: good quality, good contour, stable from 2/26/18 scan  OS: good quality, superior IRF/ME/thickening much improved since Dec, stable since 2/26/18 except for tiny cystic spaces nasal to fovea.  No thickening today    Assessment /Plan     For exam results, see Encounter Report.    Branch retinal vein occlusion of left eye with macular edema  TIny IRF today 6 wks after Avastin #3      Recommend continue Avastin OS.  RBA discussed.  Pt agrees.    Risks, benefits, and alternatives to treatment were discussed in detail with the patient, including bleeding/infection (endophthalmitis)/etc.  The patient voiced understanding and wished to proceed with the procedure.  See separate consent form.    Injection Procedure Note:  Diagnosis: HRVO with ME Left Eye    Topical Proparacaine drop placed then topical 10% Betadine swabs to lids, lashes, and conjunctiva.  Sterile gloves used, and sterile lid speculum placed.  10% Betadine swabbed at injection site again prior to injection.  Avastin 1.25mg in 0.05cc Injected at 5:00 position 3.5-4mm posterior to the limbus.  Complications: None  Va at least CF at 5 feet post injection.  Retina, ONH, IOP normal after injection.    Return to clinic in 6 wks for reevaluation, OCT, and possible injection depending upon findings.  Patient should return immediately PRN.  Retinal Detachment and Endophthalmitis precautions given.

## 2018-04-09 NOTE — PATIENT INSTRUCTIONS
POST INTRAVITREAL INJECTION PATIENT INSTRUCTIONS   Below are some guidelines for what to expect after your treatment and additional care instructions.    You may experience mild discomfort in your eye after the treatment. Usually your eye feels better within 24 to 48 hours after the procedure.      You have been given drops that numb the surface of your eye. DO NOT rub or touch your eye. DO NOT wear contacts until numbness goes away.      You may experience small spots that appear in your field of vision. These are usually caused by an air bubble or from the medication. It takes a few hours or days for these to go away.      The use of sunglasses will help reduce light sensitivity and glare.      DO NOT swim or put sink water (tap water) in your eyes for at least 4 hours after the injection.      You may get a gritty, burning, irritating or stinging feeling in the injected eye as a result of the antiseptic used. Use artificial tears or eye lubricant to reduce the sensation. These are available over-the-counter from your local pharmacy. If you already have some at home, be sure to check the expiration date and to avoid contaminating your eye. A cool pack over your eye brow above the injected eye may also relieve discomfort.     Call us right away or go to the Emergency Department if you have a dramatic decrease in vision or extreme pain in the eye.   [facility] [phone]   Item: [id number]   Revised: 09/2015

## 2018-04-23 ENCOUNTER — OFFICE VISIT (OUTPATIENT)
Dept: CARDIOLOGY | Facility: CLINIC | Age: 63
End: 2018-04-23
Payer: COMMERCIAL

## 2018-04-23 VITALS
OXYGEN SATURATION: 99 % | HEART RATE: 68 BPM | DIASTOLIC BLOOD PRESSURE: 108 MMHG | HEIGHT: 62 IN | WEIGHT: 177.94 LBS | BODY MASS INDEX: 32.74 KG/M2 | SYSTOLIC BLOOD PRESSURE: 193 MMHG

## 2018-04-23 DIAGNOSIS — I49.3 FREQUENT PVCS: ICD-10-CM

## 2018-04-23 DIAGNOSIS — I10 ESSENTIAL HYPERTENSION: Primary | ICD-10-CM

## 2018-04-23 DIAGNOSIS — R07.9 CHEST PAIN IN ADULT: ICD-10-CM

## 2018-04-23 DIAGNOSIS — I10 HYPERTENSION: ICD-10-CM

## 2018-04-23 DIAGNOSIS — R06.02 SHORTNESS OF BREATH: ICD-10-CM

## 2018-04-23 PROCEDURE — 99214 OFFICE O/P EST MOD 30 MIN: CPT | Mod: S$GLB,,, | Performed by: INTERNAL MEDICINE

## 2018-04-23 PROCEDURE — 99999 PR PBB SHADOW E&M-EST. PATIENT-LVL III: CPT | Mod: PBBFAC,,, | Performed by: INTERNAL MEDICINE

## 2018-04-23 PROCEDURE — 93010 ELECTROCARDIOGRAM REPORT: CPT | Mod: S$GLB,,, | Performed by: INTERNAL MEDICINE

## 2018-04-23 PROCEDURE — 3080F DIAST BP >= 90 MM HG: CPT | Mod: CPTII,S$GLB,, | Performed by: INTERNAL MEDICINE

## 2018-04-23 PROCEDURE — 3077F SYST BP >= 140 MM HG: CPT | Mod: CPTII,S$GLB,, | Performed by: INTERNAL MEDICINE

## 2018-04-23 RX ORDER — LISINOPRIL 40 MG/1
40 TABLET ORAL DAILY
Qty: 90 TABLET | Refills: 3 | Status: SHIPPED | OUTPATIENT
Start: 2018-04-23 | End: 2019-06-04 | Stop reason: SDUPTHER

## 2018-04-23 NOTE — PROGRESS NOTES
Subjective:    Patient ID:  Riley Marcus is a 63 y.o. female who presents for follow-up of Hypertension      HPI     Last saw me 11/2016    HTN, palpitations - frequent PVCs    Stress test 6/25/15  1. The EKG portion of this study is negative for ischemia at a moderate workload, and peak heart rate of 171 bpm (111% of predicted).   2. Blood pressure response to exercise was normal (Presenting BP: 147/95 Peak BP: 206/101).   3. No significant arrhythmias were present.      Echo 6/25/15  1 - Mildly depressed left ventricular systolic function (EF 45-50%).      Holter 6/25/15  1. Sinus rhythm with heart rates varying between 53 and 146 bpm with an average of 90 bpm.     VENTRICULAR ARRHYTHMIAS  1. There were very frequent PVCs totalling 46798 and averaging 476 per hour. There were 8 couplets.    2. There were no episodes of ventricular tachycardia.    SUPRA VENTRICULAR ARRHYTHMIAS  1. There were very rare PACs recorded totalling 1 and averaging less than 1 per hour.     2. There were no episodes of sustained supraventricular tachycardia.    SINUS NODE FUNCTION  1. There was no evidence of high grade SA fr[place] block.     AV CONDUCTION  1. There was no evidence of high grade AV block    BP elevated today - ran out of lisinopril 1 week ago  Denies CP or SOB  Occasional palpitations  EKG NSR - ok    Review of Systems   Constitution: Negative for decreased appetite.   HENT: Negative for ear discharge.    Eyes: Negative for blurred vision.   Respiratory: Negative for hemoptysis.    Endocrine: Negative for polyphagia.   Hematologic/Lymphatic: Negative for adenopathy.   Skin: Negative for color change.   Musculoskeletal: Negative for joint swelling.   Neurological: Negative for brief paralysis.   Psychiatric/Behavioral: Negative for hallucinations.        Objective:    Physical Exam   Constitutional: She is oriented to person, place, and time. She appears well-developed and well-nourished.   HENT:   Head: Normocephalic and  atraumatic.   Eyes: Conjunctivae are normal. Pupils are equal, round, and reactive to light.   Neck: Normal range of motion. Neck supple.   Cardiovascular: Normal rate, normal heart sounds and intact distal pulses.    Pulmonary/Chest: Effort normal and breath sounds normal.   Abdominal: Soft. Bowel sounds are normal.   Musculoskeletal: Normal range of motion.   Neurological: She is alert and oriented to person, place, and time.   Skin: Skin is warm and dry.         Assessment:       1. Essential hypertension    2. Frequent PVCs    3. Chest pain in adult    4. Shortness of breath         Plan:       Resume BP medication - if still not controlled could add norvasc  Echo and holter

## 2018-04-26 RX ORDER — METOPROLOL SUCCINATE 25 MG/1
25 TABLET, EXTENDED RELEASE ORAL DAILY
Qty: 90 TABLET | Refills: 3 | Status: SHIPPED | OUTPATIENT
Start: 2018-04-26 | End: 2019-06-04 | Stop reason: SDUPTHER

## 2018-04-26 RX ORDER — TRIAMTERENE AND HYDROCHLOROTHIAZIDE 37.5; 25 MG/1; MG/1
1 CAPSULE ORAL DAILY
Qty: 90 CAPSULE | Refills: 3 | Status: SHIPPED | OUTPATIENT
Start: 2018-04-26 | End: 2019-06-04 | Stop reason: SDUPTHER

## 2018-04-27 ENCOUNTER — HOSPITAL ENCOUNTER (OUTPATIENT)
Dept: CARDIOLOGY | Facility: HOSPITAL | Age: 63
Discharge: HOME | End: 2018-04-27
Attending: INTERNAL MEDICINE
Payer: COMMERCIAL

## 2018-04-27 DIAGNOSIS — R06.02 SHORTNESS OF BREATH: ICD-10-CM

## 2018-04-27 DIAGNOSIS — I10 ESSENTIAL HYPERTENSION: ICD-10-CM

## 2018-04-27 DIAGNOSIS — I49.3 FREQUENT PVCS: ICD-10-CM

## 2018-04-27 DIAGNOSIS — R07.9 CHEST PAIN IN ADULT: ICD-10-CM

## 2018-04-27 LAB
MITRAL VALVE REGURGITATION: NORMAL
RETIRED EF AND QEF - SEE NOTES: 55 (ref 55–65)

## 2018-04-27 PROCEDURE — 93226 XTRNL ECG REC<48 HR SCAN A/R: CPT

## 2018-04-27 PROCEDURE — 93227 XTRNL ECG REC<48 HR R&I: CPT | Mod: ,,, | Performed by: INTERNAL MEDICINE

## 2018-04-27 PROCEDURE — 93306 TTE W/DOPPLER COMPLETE: CPT

## 2018-04-27 PROCEDURE — 93306 TTE W/DOPPLER COMPLETE: CPT | Mod: 26,,, | Performed by: INTERNAL MEDICINE

## 2018-05-28 ENCOUNTER — PROCEDURE VISIT (OUTPATIENT)
Dept: OPHTHALMOLOGY | Facility: CLINIC | Age: 63
End: 2018-05-28
Payer: COMMERCIAL

## 2018-05-28 VITALS — HEART RATE: 61 BPM | DIASTOLIC BLOOD PRESSURE: 86 MMHG | SYSTOLIC BLOOD PRESSURE: 163 MMHG

## 2018-05-28 DIAGNOSIS — H34.8320 BRANCH RETINAL VEIN OCCLUSION OF LEFT EYE WITH MACULAR EDEMA: ICD-10-CM

## 2018-05-28 PROCEDURE — 67028 INJECTION EYE DRUG: CPT | Mod: LT,S$GLB,, | Performed by: OPHTHALMOLOGY

## 2018-05-28 PROCEDURE — 92134 CPTRZ OPH DX IMG PST SGM RTA: CPT | Mod: S$GLB,,, | Performed by: OPHTHALMOLOGY

## 2018-05-28 PROCEDURE — 99499 UNLISTED E&M SERVICE: CPT | Mod: S$GLB,,, | Performed by: OPHTHALMOLOGY

## 2018-05-28 RX ADMIN — Medication 1.25 MG: at 10:05

## 2018-05-28 NOTE — PATIENT INSTRUCTIONS
POST INTRAVITREAL INJECTION PATIENT INSTRUCTIONS   Below are some guidelines for what to expect after your treatment and additional care instructions.    You may experience mild discomfort in your eye after the treatment. Usually your eye feels better within 24 to 48 hours after the procedure.      You have been given drops that numb the surface of your eye. DO NOT rub or touch your eye. DO NOT wear contacts until numbness goes away.      You may experience small spots that appear in your field of vision. These are usually caused by an air bubble or from the medication. It takes a few hours or days for these to go away.      The use of sunglasses will help reduce light sensitivity and glare.      DO NOT swim or put sink water (tap water) in your eyes for at least 4 hours after the injection.      You may get a gritty, burning, irritating or stinging feeling in the injected eye as a result of the antiseptic used. Use artificial tears or eye lubricant to reduce the sensation. These are available over-the-counter from your local pharmacy. If you already have some at home, be sure to check the expiration date and to avoid contaminating your eye. A cool pack over your eye brow above the injected eye may also relieve discomfort.     Call us right away or go to the Emergency Department if you have a dramatic decrease in vision or extreme pain in the eye.   [facility] [phone]   Item: [id number]   Revised: 09/2015       Controlling High Blood Pressure  High blood pressure (hypertension) is often called the silent killer. This is because many people who have it dont know it. High blood pressure is defined as 140/90 mm Hg or higher. Know your blood pressure and remember to check it regularly. Doing so can save your life. Here are some things you can do to help control your blood pressure.    Choose heart-healthy foods  · Select low-salt, low-fat foods. Limit sodium intake to 2,400 mg per day or the  amount suggested by your healthcare provider.  · Limit canned, dried, cured, packaged, and fast foods. These can contain a lot of salt.  · Eat 8 to 10 servings of fruits and vegetables every day.  · Choose lean meats, fish, or chicken.  · Eat whole-grain pasta, brown rice, and beans.  · Eat 2 to 3 servings of low-fat or fat-free dairy products.  · Ask your doctor about the DASH eating plan. This plan helps reduce blood pressure.  · When you go to a restaurant, ask that your meal be prepared with no added salt.  Maintain a healthy weight  · Ask your healthcare provider how many calories to eat a day. Then stick to that number.  · Ask your healthcare provider what weight range is healthiest for you. If you are overweight, a weight loss of only 3% to 5% of your body weight can help lower blood pressure. Generally, a good weight loss goal is to lose 10% of your body weight in a year.  · Limit snacks and sweets.  · Get regular exercise.  Get up and get active  · Choose activities you enjoy. Find ones you can do with friends or family. This includes bicycling, dancing, walking, and jogging.  · Park farther away from building entrances.  · Use stairs instead of the elevator.  · When you can, walk or bike instead of driving.  · [place] leaves, garden, or do household repairs.  · Be active at a moderate to vigorous level of physical activity for at least 40 minutes for a minimum of 3 to 4 days a week.   Manage stress  · Make time to relax and enjoy life. Find time to laugh.  · Communicate your concerns with your loved ones and your healthcare provider.  · Visit with family and friends, and keep up with hobbies.  Limit alcohol and quit smoking  · Men should have no more than 2 drinks per day.  · Women should have no more than 1 drink per day.  · Talk with your healthcare provider about quitting smoking. Smoking significantly increases your risk for heart disease and stroke. Ask your healthcare provider about community smoking  cessation programs and other options.  Medicines  If lifestyle changes arent enough, your healthcare provider may prescribe high blood pressure medicine. Take all medicines as prescribed. If you have any questions about your medicines, ask your healthcare provider before stopping or changing them.

## 2018-05-28 NOTE — PROGRESS NOTES
HPI     DLS  04/09/18   62 Y/O F here today for her 6 wk BRVO OS w/ ME. PT   states' after last injection my eye (Left eye) lid was swollen. Went away   by end of the day.  Fine by next day    Va good OU.  No pain/redness/f/f    AT's PRN     POHx:   S/P Avastin OS x 3 (02/26/2018)   1. BRVO OS w/ ME    Last edited by Harpreet Olson MD on 5/28/2018  9:21 AM. (History)        [place] SDOCT:   OD: good quality, good contour, stable from 4/9/18 scan  OS: good quality, No ME, cystic spaces gone, improved since 4/9/18 scan      Assessment /Plan     For exam results, see Encounter Report.    Branch retinal vein occlusion of left eye with macular edema  -     Cancel: OCT- Retina  -     Posterior Segment OCT Retina-Both eyes  -     Posterior Segment OCT Retina-Both eyes; Future  -     Prior Authorization Order    Other orders  -     bevacizumab (AVASTIN) 2.5 mg/0.10 mL 1.25 mg; Inject 0.05 mLs (1.25 mg total) into the eye one time.      ME controlled today  7 wks s/p Avastin  #4    Discussed Rx and extend vs observe  Rec injection today and f/u in 8-9 wks  Pt agrees    Risks, benefits, and alternatives to treatment were discussed in detail with the patient, including bleeding/infection (endophthalmitis)/etc.  The patient voiced understanding and wished to proceed with the procedure.  See separate consent form.    Injection Procedure Note:  Diagnosis: HRVO with ME Left Eye    Topical Proparacaine drop placed then topical 10% Betadine swabs to lids, lashes, and conjunctiva.  Sterile gloves used, and sterile lid speculum placed.  10% Betadine swabbed at injection site again prior to injection.  Avastin 1.25mg in 0.05cc Injected at 5:00 position 3.5-4mm posterior to the limbus.  Complications: None  Va at least CF at 5 feet post injection.  Retina, ONH, IOP normal after injection.    Return to clinic in 8 weeks for reevaluation, OCT, and possible injection depending upon findings.  Patient should return immediately PRN.   Retinal Detachment and Endophthalmitis precautions given.

## 2018-07-23 ENCOUNTER — OFFICE VISIT (OUTPATIENT)
Dept: OPHTHALMOLOGY | Facility: CLINIC | Age: 63
End: 2018-07-23
Payer: COMMERCIAL

## 2018-07-23 VITALS — DIASTOLIC BLOOD PRESSURE: 86 MMHG | SYSTOLIC BLOOD PRESSURE: 135 MMHG | HEART RATE: 73 BPM

## 2018-07-23 DIAGNOSIS — H25.13 NUCLEAR SCLEROSIS OF BOTH EYES: ICD-10-CM

## 2018-07-23 DIAGNOSIS — H34.8320 BRANCH RETINAL VEIN OCCLUSION OF LEFT EYE WITH MACULAR EDEMA: Primary | ICD-10-CM

## 2018-07-23 PROCEDURE — 92226 PR SPECIAL EYE EXAM, SUBSEQUENT: CPT | Mod: LT,S$GLB,, | Performed by: OPHTHALMOLOGY

## 2018-07-23 PROCEDURE — 92014 COMPRE OPH EXAM EST PT 1/>: CPT | Mod: S$GLB,,, | Performed by: OPHTHALMOLOGY

## 2018-07-23 PROCEDURE — 92134 CPTRZ OPH DX IMG PST SGM RTA: CPT | Mod: S$GLB,,, | Performed by: OPHTHALMOLOGY

## 2018-07-23 PROCEDURE — 99999 PR PBB SHADOW E&M-EST. PATIENT-LVL III: CPT | Mod: PBBFAC,,, | Performed by: OPHTHALMOLOGY

## 2018-07-23 RX ORDER — MAGNESIUM 30 MG
TABLET ORAL ONCE
COMMUNITY
End: 2023-05-08

## 2018-07-23 NOTE — PROGRESS NOTES
HPI     8 week / OCT / Avastin   DLS- 05/28/2018 Dr. Olson    Pt sts improvement in va OS since last visit.  Va OD stable and good.    Denies pain   (-)Flashes (-)Floaters  (-)Photophobia  (-)Glare    No gtts     POHx:   S/P Avastin OS x 4 (05/28/2018)   1. BRVO OS w/ ME    Last edited by Harpreet Olson MD on 7/23/2018  9:38 AM. (History)        [place] SDOCT:   OD: good quality, good contour, stable from 5/28/18 scan  OS: good quality, No ME, cystic spaces gone, improved since 5/28/18 scan    Assessment /Plan     For exam results, see Encounter Report.    Branch retinal vein occlusion of left eye with macular edema  -     Posterior Segment OCT Retina-Both eyes; Future  -     Posterior Segment OCT Retina-Both eyes    Nuclear sclerosis of both eyes      ME resolved.  8 wks after Avastin OS  Recommend Rx and extend.  Discussed obs.  Pt elects obs.  Understands possible return of ME and vision loss  CV risk factor control.  RTC one month, sooner PRN    Observe NS OU.  Excellent Va

## 2018-07-23 NOTE — PROGRESS NOTES
HPI     8 week / OCT / Avastin   DLS- 05/28/2018 Dr. Olson    Pt sts improvement in va OS since last visit.  Va OD stable and good.    Denies pain   (-)Flashes (-)Floaters  (-)Photophobia  (-)Glare    No gtts     POHx:   S/P Avastin OS x 4 (05/28/2018)   1. BRVO OS w/ ME    Last edited by Harpreet Olson MD on 7/23/2018  9:38 AM. (History)            Assessment /Plan     For exam results, see Encounter Report.    Branch retinal vein occlusion of left eye with macular edema  -     Posterior Segment OCT Retina-Both eyes; Future  -     Posterior Segment OCT Retina-Both eyes    Nuclear sclerosis of both eyes      ***

## 2018-08-27 ENCOUNTER — PROCEDURE VISIT (OUTPATIENT)
Dept: OPHTHALMOLOGY | Facility: CLINIC | Age: 63
End: 2018-08-27
Payer: COMMERCIAL

## 2018-08-27 VITALS — DIASTOLIC BLOOD PRESSURE: 92 MMHG | HEART RATE: 93 BPM | SYSTOLIC BLOOD PRESSURE: 154 MMHG

## 2018-08-27 DIAGNOSIS — H34.8320 BRANCH RETINAL VEIN OCCLUSION OF LEFT EYE WITH MACULAR EDEMA: ICD-10-CM

## 2018-08-27 PROCEDURE — 92226 PR SPECIAL EYE EXAM, SUBSEQUENT: CPT | Mod: LT,S$GLB,, | Performed by: OPHTHALMOLOGY

## 2018-08-27 PROCEDURE — 92012 INTRM OPH EXAM EST PATIENT: CPT | Mod: S$GLB,,, | Performed by: OPHTHALMOLOGY

## 2018-08-27 PROCEDURE — 92134 CPTRZ OPH DX IMG PST SGM RTA: CPT | Mod: S$GLB,,, | Performed by: OPHTHALMOLOGY

## 2018-08-27 NOTE — PROGRESS NOTES
HPI     8 week / OCT / Avastin   DLS- 07/23/2018 Dr. Olson    Pt states that there has been nop changes in eyes or vision since last   visit only wear readers for vision. Denies pain     (-)Flashes (-)Floaters  (-)Photophobia  (-)Glare    Eye Med()s) - none     POHx:   S/P Avastin OS x 4 (05/28/2018)   1. BRVO OS w/ ME    Last edited by Toshia Turner MA on 8/27/2018  8:42 AM. (History)        [place] SDOCT:   OD: good quality, good contour, stable from 8/27/18 scan  OS: good quality, No ME, tiny nasal cystic spaces returned since 7/23/18 scan but no thickening    Assessment /Plan     For exam results, see Encounter Report.    Branch retinal vein occlusion of left eye with macular edema  -     Posterior Segment OCT Retina-Both eyes      Last injection 5/28/18  Now observing for recurrence  Min cystic change on OCT without thickening and with excellent vision  Observe  CV risk factor control  RTC one month, sooner PRN

## 2018-09-20 ENCOUNTER — TELEPHONE (OUTPATIENT)
Dept: OBSTETRICS AND GYNECOLOGY | Facility: CLINIC | Age: 63
End: 2018-09-20

## 2018-09-20 DIAGNOSIS — Z12.31 VISIT FOR SCREENING MAMMOGRAM: Primary | ICD-10-CM

## 2018-09-20 NOTE — TELEPHONE ENCOUNTER
----- Message from Lev Brown MA sent at 9/20/2018 12:30 PM CDT -----  Contact: Pt  Pt called and would like to schedule a appt regarding a annual visit.        Pt can be reached at [phone].      Thanks

## 2018-10-17 ENCOUNTER — PROCEDURE VISIT (OUTPATIENT)
Dept: OPHTHALMOLOGY | Facility: CLINIC | Age: 63
End: 2018-10-17
Attending: OPHTHALMOLOGY
Payer: COMMERCIAL

## 2018-10-17 VITALS — DIASTOLIC BLOOD PRESSURE: 87 MMHG | SYSTOLIC BLOOD PRESSURE: 134 MMHG | HEART RATE: 70 BPM

## 2018-10-17 DIAGNOSIS — H25.13 NUCLEAR SCLEROSIS OF BOTH EYES: ICD-10-CM

## 2018-10-17 DIAGNOSIS — H35.412 LATTICE DEGENERATION, LEFT: ICD-10-CM

## 2018-10-17 DIAGNOSIS — H34.8320 BRANCH RETINAL VEIN OCCLUSION OF LEFT EYE WITH MACULAR EDEMA: Primary | ICD-10-CM

## 2018-10-17 PROCEDURE — 92012 INTRM OPH EXAM EST PATIENT: CPT | Mod: S$GLB,,, | Performed by: OPHTHALMOLOGY

## 2018-10-17 PROCEDURE — 92134 CPTRZ OPH DX IMG PST SGM RTA: CPT | Mod: S$GLB,,, | Performed by: OPHTHALMOLOGY

## 2018-10-17 PROCEDURE — 92226 PR SPECIAL EYE EXAM, SUBSEQUENT: CPT | Mod: LT,S$GLB,, | Performed by: OPHTHALMOLOGY

## 2018-10-17 NOTE — PROGRESS NOTES
HPI     DLS 08/27/18    62 Y/O F here today for her 1 mo BRVO OS w/ ME ck. Pt   states' vision seems much clearer since last visit. stj     Va good OU.  No f/f/pain OU.    Eye Med()s) - none     -eye pain   -floaters  -light flashes    POHx:   S/P Avastin OS x 4 (05/28/2018)   1. BRVO OS w/ ME    Last edited by Harpreet Olson MD on 10/17/2018  9:01 AM. (History)        [place] SDOCT:   OD: good quality, good contour, stable from 8/27/18 scan  OS: good quality, good contour, no fluid, improved from 8/27/18 scan      Assessment /Plan     For exam results, see Encounter Report.    Branch retinal vein occlusion of left eye with macular edema  -     Cancel: OCT, Retina - OU - Both Eyes  -     Posterior Segment OCT Retina-Both eyes  -     Posterior Segment OCT Retina-Both eyes; Future    Lattice degeneration, left    Nuclear sclerosis of both eyes      Doing well.  ME improved without recent Rx.  Last inj May 2018  CV risk factor control    ME and RD precautions    RTC 6 months, sooner PRN

## 2018-11-05 ENCOUNTER — OFFICE VISIT (OUTPATIENT)
Dept: OBSTETRICS AND GYNECOLOGY | Facility: CLINIC | Age: 63
End: 2018-11-05
Payer: COMMERCIAL

## 2018-11-05 ENCOUNTER — HOSPITAL ENCOUNTER (OUTPATIENT)
Dept: RADIOLOGY | Facility: HOSPITAL | Age: 63
Discharge: HOME | End: 2018-11-05
Attending: OBSTETRICS & GYNECOLOGY
Payer: COMMERCIAL

## 2018-11-05 VITALS
DIASTOLIC BLOOD PRESSURE: 88 MMHG | HEIGHT: 62 IN | BODY MASS INDEX: 33.49 KG/M2 | SYSTOLIC BLOOD PRESSURE: 142 MMHG | WEIGHT: 182 LBS

## 2018-11-05 DIAGNOSIS — Z12.31 VISIT FOR SCREENING MAMMOGRAM: ICD-10-CM

## 2018-11-05 DIAGNOSIS — Z01.419 ENCOUNTER FOR GYNECOLOGICAL EXAMINATION WITHOUT ABNORMAL FINDING: ICD-10-CM

## 2018-11-05 DIAGNOSIS — I10 ESSENTIAL HYPERTENSION: Primary | ICD-10-CM

## 2018-11-05 PROCEDURE — 3079F DIAST BP 80-89 MM HG: CPT | Mod: CPTII,S$GLB,, | Performed by: OBSTETRICS & GYNECOLOGY

## 2018-11-05 PROCEDURE — 3077F SYST BP >= 140 MM HG: CPT | Mod: CPTII,S$GLB,, | Performed by: OBSTETRICS & GYNECOLOGY

## 2018-11-05 PROCEDURE — 77063 BREAST TOMOSYNTHESIS BI: CPT | Mod: TC

## 2018-11-05 PROCEDURE — 99999 PR PBB SHADOW E&M-EST. PATIENT-LVL III: CPT | Mod: PBBFAC,,, | Performed by: OBSTETRICS & GYNECOLOGY

## 2018-11-05 PROCEDURE — 77067 SCR MAMMO BI INCL CAD: CPT | Mod: 26,,, | Performed by: RADIOLOGY

## 2018-11-05 PROCEDURE — 77063 BREAST TOMOSYNTHESIS BI: CPT | Mod: 26,,, | Performed by: RADIOLOGY

## 2018-11-05 PROCEDURE — 99396 PREV VISIT EST AGE 40-64: CPT | Mod: S$GLB,,, | Performed by: OBSTETRICS & GYNECOLOGY

## 2018-11-05 NOTE — PROGRESS NOTES
HISTORY OF PRESENT ILLNESS:    Riley Marcus is a 63 y.o. female, , No LMP recorded. Patient has had a hysterectomy.,  presents for a routine exam and has no complaints.    Past Medical History:   Diagnosis Date    Arthritis     Blood transfusion     Corneal abrasion 20 yrs    ? eye due to cls     Hypertension     Nuclear sclerosis of both eyes 2017       Past Surgical History:   Procedure Laterality Date    Breast reduction      EYE SURGERY      Lasik bilat eyes    HYSTERECTOMY      INJECTION-STEROID-EPIDURAL-CAUDAL (C-ARM) N/A 2015    Performed by Jack Bedolla MD at [facility] OR    LASER LAPAROSCOPY      MINI-LAPAROTOMY         MEDICATIONS AND ALLERGIES:      Current Outpatient Medications:     ascorbic acid (VITAMIN C) 500 MG tablet, Take 500 mg by mouth once daily.  , Disp: , Rfl:     aspirin 81 mg Tab, Take by mouth. 1 Tablet Oral Every day, Disp: , Rfl:     calcium-vitamin D3 (CALCIUM 500 + D) 500 mg(1,250mg) -200 unit per tablet, Take 1 tablet by mouth 2 (two) times daily with meals., Disp: , Rfl:     cyanocobalamin-cobamamide (B-12 PLUS) 5,000-100 mcg Subl, Place under the tongue. 1 Tablet, Sublingual Sublingual Every day, Disp: , Rfl:     GLUC MAURICE/CHONDRO MAURICE A/VIT C/MN (GLUCOSAMINE-CHONDROIT-VIT C-MN) 153-322-14-5 mg Tab, Take by mouth. 1 Tablet Oral Every day, Disp: , Rfl:     green tea leaf extract (GREEN TEA) 315 mg Cap, Take by mouth. 1 Capsule Oral Every day, Disp: , Rfl:     lisinopril (PRINIVIL,ZESTRIL) 40 MG tablet, Take 1 tablet (40 mg total) by mouth once daily., Disp: 90 tablet, Rfl: 3    magnesium 30 mg Tab, Take by mouth once., Disp: , Rfl:     metoprolol succinate (TOPROL-XL) 25 MG 24 hr tablet, Take 1 tablet (25 mg total) by mouth once daily., Disp: 90 tablet, Rfl: 3    multivitamin with minerals tablet, Take 1 tablet by mouth once daily., Disp: , Rfl:     omega-3 fatty acids 1,000 mg Cap, Take by mouth. 1 Capsule Oral Every day, Disp: , Rfl:      triamterene-hydrochlorothiazide 37.5-25 mg (DYAZIDE) 37.5-25 mg per capsule, Take 1 capsule by mouth once daily., Disp: 90 capsule, Rfl: 3    VITAMIN E,DL-ALPHA TOCOPHEROL, (VITAMIN E, BULK, MISC), by Misc.(Non-Drug; Combo Route) route., Disp: , Rfl:     Review of patient's allergies indicates:   Allergen Reactions    Sulfa (sulfonamide antibiotics) Itching and Rash     Other reaction(s): Rash       Family History   Problem Relation Age of Onset    Arthritis Mother     Heart disease Mother     Hypertension Mother     Cataracts Mother     Heart disease Father     Hypertension Father     Stroke Father     Diabetes Sister     Hypertension Sister     Hypertension Sister     Eczema Other     Arthritis Maternal Grandmother     Heart disease Maternal Grandmother     Hypertension Maternal Grandmother     Arthritis Maternal Grandfather     Arthritis Paternal Grandmother     Heart disease Paternal Grandmother     Arthritis Paternal Grandfather     No Known Problems Brother     No Known Problems Maternal Aunt     No Known Problems Maternal Uncle     No Known Problems Paternal Aunt     No Known Problems Paternal Uncle     Ovarian cancer Neg Hx     Breast cancer Neg Hx     Anxiety disorder Neg Hx     Depression Neg Hx     Suicide Neg Hx     Amblyopia Neg Hx     Blindness Neg Hx     Cancer Neg Hx     Glaucoma Neg Hx     Macular degeneration Neg Hx     Retinal detachment Neg Hx     Strabismus Neg Hx     Thyroid disease Neg Hx        Social History     Socioeconomic History    Marital status:      Spouse name: Not on file    Number of children: 0    Years of education: Not on file    Highest education level: Not on file   Social Needs    Financial resource strain: Not on file    Food insecurity - worry: Not on file    Food insecurity - inability: Not on file    Transportation needs - medical: Not on file    Transportation needs - non-medical: Not on file   Occupational History  "   Occupation: RN     Employer: [employer]   Tobacco Use    Smoking status: Never Smoker    Smokeless tobacco: Never Used   Substance and Sexual Activity    Alcohol use: Yes     Comment: RARELY    Drug use: No    Sexual activity: No     Partners: Male   Other Topics Concern    Are you pregnant or think you may be? Not Asked    Breast-feeding Not Asked    Patient feels they ought to cut down on drinking/drug use Not Asked    Patient annoyed by others criticizing their drinking/drug use Not Asked    Patient has felt bad or guilty about drinking/drug use Not Asked    Patient has had a drink/used drugs as an eye opener in the AM Not Asked   Social History Narrative    Not on file       COMPREHENSIVE GYN HISTORY:  PAP History: Denies abnormal Paps.  Infection History: Denies STDs. Denies PID.  Benign History: Denies uterine fibroids. Denies ovarian cysts. Denies endometriosis. Denies other conditions.  Cancer History: Denies cervical cancer. Denies uterine cancer or hyperplasia. Denies ovarian cancer. Denies vulvar cancer or pre-cancer. Denies vaginal cancer or pre-cancer. Denies breast cancer. Denies colon cancer.  Sexual Activity History: Reports currently being sexually active  Menstrual History: Monthly. Mod then light flow.   Dysmenorrhea History: Reports mild dysmenorrhea.       ROS:  GENERAL: No weight changes. No swelling. No fatigue. No fever.  CARDIOVASCULAR: No chest pain. No shortness of breath. No leg cramps.   NEUROLOGICAL: No headaches. No vision changes.  BREASTS: No pain. No lumps. No discharge.  ABDOMEN: No pain. No nausea. No vomiting. No diarrhea. No constipation.  REPRODUCTIVE: No abnormal bleeding.   VULVA: No pain. No lesions. No itching.  VAGINA: No relaxation. No itching. No odor. No discharge. No lesions.  URINARY: No incontinence. No nocturia. No frequency. No dysuria.    BP (!) 142/88   Ht 5' 2" (1.575 m)   Wt 82.6 kg (182 lb)   BMI 33.29 kg/m² "     PE:  APPEARANCE: Well nourished, well developed, in no acute distress.  AFFECT: WNL, alert and oriented x 3.  SKIN: No acne or hirsutism.  NECK: Neck symmetric, without masses or thyromegaly.  NODES: No inguinal, cervical, axillary or femoral lymph node enlargement.  CHEST: Good respiratory effort.   ABDOMEN: Soft. No tenderness or masses. No hepatosplenomegaly. No hernias.  BREASTS: Symmetrical, no skin changes, visible lesions, palpable masses or nipple discharge bilaterally.  PELVIC: External female genitalia without lesions.  Female hair distribution. Adequate perineal body, Normal urethral meatus. Vagina moist and well rugated without lesions or discharge.  No significant cystocele or rectocele present. Uterus absent. Adnexa without masses or tenderness.  EXTREMITIES: No edema    DIAGNOSIS:  1. Essential hypertension    2. Encounter for gynecological examination without abnormal finding    3. Visit for screening mammogram      COUNSELING:  The patient was counseled today on:  -A.C.S. Pap and pelvic exam guidelines (pap every 3 years), recomendations for yearly mammogram;  -to follow up with her PCP for other health maintenance.    FOLLOW-UP with me annually.

## 2018-12-15 ENCOUNTER — OFFICE VISIT (OUTPATIENT)
Dept: URGENT CARE | Facility: CLINIC | Age: 63
End: 2018-12-15
Payer: COMMERCIAL

## 2018-12-15 VITALS
DIASTOLIC BLOOD PRESSURE: 94 MMHG | OXYGEN SATURATION: 96 % | HEIGHT: 62 IN | SYSTOLIC BLOOD PRESSURE: 148 MMHG | BODY MASS INDEX: 33.49 KG/M2 | HEART RATE: 114 BPM | WEIGHT: 182 LBS | TEMPERATURE: 101 F | RESPIRATION RATE: 20 BRPM

## 2018-12-15 DIAGNOSIS — J02.9 PHARYNGITIS, UNSPECIFIED ETIOLOGY: Primary | ICD-10-CM

## 2018-12-15 DIAGNOSIS — R05.9 COUGH: ICD-10-CM

## 2018-12-15 DIAGNOSIS — R68.83 CHILLS: ICD-10-CM

## 2018-12-15 DIAGNOSIS — R50.9 FEVER, UNSPECIFIED FEVER CAUSE: ICD-10-CM

## 2018-12-15 LAB
CTP QC/QA: YES
CTP QC/QA: YES
FLUAV AG NPH QL: NEGATIVE
FLUBV AG NPH QL: NEGATIVE
S PYO RRNA THROAT QL PROBE: NEGATIVE

## 2018-12-15 PROCEDURE — 3008F BODY MASS INDEX DOCD: CPT | Mod: CPTII,S$GLB,, | Performed by: PHYSICIAN ASSISTANT

## 2018-12-15 PROCEDURE — 3080F DIAST BP >= 90 MM HG: CPT | Mod: CPTII,S$GLB,, | Performed by: PHYSICIAN ASSISTANT

## 2018-12-15 PROCEDURE — 3077F SYST BP >= 140 MM HG: CPT | Mod: CPTII,S$GLB,, | Performed by: PHYSICIAN ASSISTANT

## 2018-12-15 PROCEDURE — 87880 STREP A ASSAY W/OPTIC: CPT | Mod: QW,S$GLB,, | Performed by: PHYSICIAN ASSISTANT

## 2018-12-15 PROCEDURE — 96372 THER/PROPH/DIAG INJ SC/IM: CPT | Mod: S$GLB,,, | Performed by: PHYSICIAN ASSISTANT

## 2018-12-15 PROCEDURE — 99203 OFFICE O/P NEW LOW 30 MIN: CPT | Mod: 25,S$GLB,, | Performed by: PHYSICIAN ASSISTANT

## 2018-12-15 PROCEDURE — 87804 INFLUENZA ASSAY W/OPTIC: CPT | Mod: 59,QW,S$GLB, | Performed by: PHYSICIAN ASSISTANT

## 2018-12-15 RX ORDER — ACETAMINOPHEN 500 MG
1000 TABLET ORAL
Status: COMPLETED | OUTPATIENT
Start: 2018-12-15 | End: 2018-12-15

## 2018-12-15 RX ORDER — BETAMETHASONE SODIUM PHOSPHATE AND BETAMETHASONE ACETATE 3; 3 MG/ML; MG/ML
6 INJECTION, SUSPENSION INTRA-ARTICULAR; INTRALESIONAL; INTRAMUSCULAR; SOFT TISSUE
Status: COMPLETED | OUTPATIENT
Start: 2018-12-15 | End: 2018-12-15

## 2018-12-15 RX ORDER — PROMETHAZINE HYDROCHLORIDE AND DEXTROMETHORPHAN HYDROBROMIDE 6.25; 15 MG/5ML; MG/5ML
5 SYRUP ORAL NIGHTLY PRN
Qty: 60 ML | Refills: 0 | Status: SHIPPED | OUTPATIENT
Start: 2018-12-15 | End: 2019-06-04

## 2018-12-15 RX ORDER — BENZONATATE 100 MG/1
100 CAPSULE ORAL EVERY 6 HOURS PRN
Qty: 30 CAPSULE | Refills: 1 | Status: SHIPPED | OUTPATIENT
Start: 2018-12-15 | End: 2019-06-04

## 2018-12-15 RX ADMIN — Medication 1000 MG: at 04:12

## 2018-12-15 RX ADMIN — BETAMETHASONE SODIUM PHOSPHATE AND BETAMETHASONE ACETATE 6 MG: 3; 3 INJECTION, SUSPENSION INTRA-ARTICULAR; INTRALESIONAL; INTRAMUSCULAR; SOFT TISSUE at 04:12

## 2018-12-15 NOTE — PROGRESS NOTES
"Subjective:       Patient ID: Riley Marcus is a 63 y.o. female.    Vitals:  height is 5' 2" (1.575 m) and weight is 82.6 kg (182 lb). Her temperature is 101.1 °F (38.4 °C) (abnormal). Her blood pressure is 148/94 (abnormal) and her pulse is 114 (abnormal). Her respiration is 20 and oxygen saturation is 96%.     Chief Complaint: Fever and Chills    Fever    This is a new problem. Episode onset: 2 days ago. The problem has been unchanged. The maximum temperature noted was 100 to 100.9 F. The temperature was taken using an oral thermometer. Associated symptoms include coughing, diarrhea (x2 yesterday) and a sore throat. Pertinent negatives include no abdominal pain, congestion, ear pain, headaches, nausea, rash, vomiting or wheezing. She has tried NSAIDs for the symptoms. The treatment provided mild relief.   Sore Throat    This is a new problem. Episode onset: 2 days ago. The problem has been rapidly worsening. Neither side of throat is experiencing more pain than the other. The maximum temperature recorded prior to her arrival was 100.4 - 100.9 F. The fever has been present for 1 to 2 days. The pain is severe ("feels like razor blades when I swallow"). Associated symptoms include coughing, diarrhea (x2 yesterday), swollen glands and trouble swallowing. Pertinent negatives include no abdominal pain, congestion, ear discharge, ear pain, headaches, shortness of breath, stridor or vomiting. Treatments tried: Mucinex DM, Allegra. The treatment provided mild relief.       Constitution: Positive for appetite change (decreased), chills and fever. Negative for sweating and fatigue.   HENT: Positive for sore throat and trouble swallowing. Negative for ear pain, ear discharge, congestion, sinus pain, sinus pressure and voice change.    Neck: Negative for painful lymph nodes.   Eyes: Negative for eye redness.   Respiratory: Positive for cough. Negative for chest tightness, sputum production, bloody sputum, COPD, shortness " of breath, stridor, wheezing and asthma.    Gastrointestinal: Positive for diarrhea (x2 yesterday). Negative for abdominal pain, nausea and vomiting.   Musculoskeletal: Negative for muscle ache.   Skin: Negative for rash.   Allergic/Immunologic: Negative for seasonal allergies and asthma.   Neurological: Negative for headaches.   Hematologic/Lymphatic: Negative for swollen lymph nodes.       Objective:      Physical Exam   Constitutional: She is oriented to person, place, and time. She appears well-developed and well-nourished. She is cooperative.  Non-toxic appearance. She does not appear ill. No distress.   HENT:   Head: Normocephalic and atraumatic.   Right Ear: Hearing, tympanic membrane, external ear and ear canal normal.   Left Ear: Hearing, tympanic membrane, external ear and ear canal normal.   Nose: Nose normal. No mucosal edema, rhinorrhea or nasal deformity. No epistaxis. Right sinus exhibits no maxillary sinus tenderness and no frontal sinus tenderness. Left sinus exhibits no maxillary sinus tenderness and no frontal sinus tenderness.   Mouth/Throat: Uvula is midline and mucous membranes are normal. No trismus in the jaw. Normal dentition. No uvula swelling. Posterior oropharyngeal edema and posterior oropharyngeal erythema present. No oropharyngeal exudate. Tonsils are 2+ on the right. Tonsils are 2+ on the left. No tonsillar exudate.   Eyes: Conjunctivae and lids are normal. No scleral icterus.   Sclera clear bilat   Neck: Trachea normal, full passive range of motion without pain and phonation normal. Neck supple.   Cardiovascular: Regular rhythm, normal heart sounds, intact distal pulses and normal pulses. Tachycardia present.   Pulmonary/Chest: Effort normal and breath sounds normal. No respiratory distress.   Abdominal: Soft. Normal appearance and bowel sounds are normal. She exhibits no distension. There is no tenderness.   Musculoskeletal: Normal range of motion. She exhibits no edema or  deformity.   Neurological: She is alert and oriented to person, place, and time. She exhibits normal muscle tone. Coordination normal.   Skin: Skin is warm, dry and intact. She is not diaphoretic. No pallor.   Psychiatric: She has a normal mood and affect. Her speech is normal and behavior is normal. Judgment and thought content normal. Cognition and memory are normal.   Nursing note and vitals reviewed.        Results for orders placed or performed in visit on 12/15/18   POCT Influenza A/B   Result Value Ref Range    Rapid Influenza A Ag Negative Negative    Rapid Influenza B Ag Negative Negative     Acceptable Yes    POCT rapid strep A   Result Value Ref Range    Rapid Strep A Screen Negative Negative     Acceptable Yes          Assessment:       1. Pharyngitis, unspecified etiology    2. Fever, unspecified fever cause    3. Chills    4. Cough        Plan:       Will treat conservatively with symptomatic measures for now and send strep culture. Provided patient with written rx for Amoxicillin 500mg BID x10 days with instructions to not fill for 3 days, and to only fill if symptoms are not improving. She voiced understanding.    Pharyngitis, unspecified etiology  -     POCT rapid strep A  -     Strep A culture, throat  -     acetaminophen tablet 1,000 mg  -     betamethasone acetate-betamethasone sodium phosphate injection 6 mg    Fever, unspecified fever cause  -     POCT Influenza A/B  -     Strep A culture, throat  -     acetaminophen tablet 1,000 mg    Chills  -     POCT Influenza A/B    Cough  -     promethazine-dextromethorphan (PROMETHAZINE-DM) 6.25-15 mg/5 mL Syrp; Take 5 mLs by mouth nightly as needed.  Dispense: 60 mL; Refill: 0  -     benzonatate (TESSALON PERLES) 100 MG capsule; Take 1 capsule (100 mg total) by mouth every 6 (six) hours as needed for Cough.  Dispense: 30 capsule; Refill: 1      Patient Instructions     · Cough syrup at night as needed. Do not drive or  operate machinery while taking this medication as it can cause drowsiness  · Tessalon Perles for daytime cough  · Below are suggestions for symptomatic relief:              -Tylenol every 4 hours OR ibuprofen every 6 hours as needed for pain/fever.              -Salt water gargles to soothe throat pain.              -Chloroseptic spray also helps to numb throat pain.              -Nasal saline spray reduces inflammation and dryness.              -Warm face compresses to help with facial sinus pain/pressure.              -Vicks vapor rub at night.              -Flonase OTC or Nasacort OTC daily for nasal congestion.              -Simple foods like chicken noodle soup.              -Delsym helps with coughing at night              -Zyrtec/Claritin during the day & Benadryl at night may help with allergies.  · If you DO NOT have Hypertension or any history of palpitations, it is ok to take over the counter Sudafed or Mucinex D or Allegra-D or Claritin-D or Zyrtec-D.  · If you do take one of the above, it is ok to combine that with plain over the counter Mucinex or Allegra or Claritin or Zyrtec. If, for example, you are taking Zyrtec -D, you can combine that with Mucinex, but not Mucinex-D.  If you are taking Mucinex-D, you can combine that with plain Allegra or Claritin or Zyrtec.   · If you DO have Hypertension or palpitations, it is safe to take Coricidin HBP for relief of sinus symptoms.  · Only fill antibiotic prescription if symptoms have not improved in 3 days with conservative measures  - Rest.    - Drink plenty of fluids.    - Follow up with your PCP or specialty clinic as directed in the next 1-2 weeks if not improved or as needed.  You can call [phone] to schedule an appointment with the appropriate provider.    · - Go to the ED if your symptoms worsen.  - You must understand that you have received an Urgent Care treatment only and that you may be released before all of your medical problems are known  or treated.   - You, the patient, will arrange for follow up care as instructed.   - If your condition worsens or fails to improve we recommend that you receive another evaluation at the ER immediately or contact your PCP to discuss your concerns or return here.       When You Have a Sore Throat    A sore throat can be painful. There are many reasons why you may have a sore throat. Your healthcare provider will work with you to find the cause of your sore throat. He or she will also find the best treatment for you.  What causes a sore throat?  Sore throats can be caused or worsened by:  · Cold or flu viruses  · Bacteria  · Irritants such as tobacco smoke or air pollution  · Acid reflux  A healthy throat  The tonsils are on the sides of the throat near the base of the tongue. They collect viruses and bacteria and help fight infection. The throat (pharynx) is the passage for air. Mucus from the nasal cavity also moves down the passage.  An inflamed throat  The tonsils and pharynx can become inflamed due to a cold or flu virus. Postnasal drip (excess mucus draining from the nasal cavity) can irritate the throat. It can also make the throat or tonsils more likely to be infected by bacteria. Severe, untreated tonsillitis in children or adults can cause a pocket of pus (abscess) to form near the tonsil.  Your evaluation  A medical evaluation can help find the cause of your sore throat. It can also help your healthcare provider choose the best treatment for you. The evaluation may include a health history, physical exam, and diagnostic tests.  Health history  Your healthcare provider may ask you the following:  · How long has the sore throat lasted and how have you been treating it?  · Do you have any other symptoms, such as body aches, fever, or cough?  · Does your sore throat recur? If so, how often? How many days of school or work have you missed because of a sore throat?  · Do you have trouble eating or  "swallowing?  · Have you been told that you snore or have other sleep problems?  · Do you have bad breath?  · Do you cough up bad-tasting mucus?  Physical exam  During the exam, your healthcare provider checks your ears, nose, and throat for problems. He or she also checks for swelling in the neck, and may listen to your chest.  Possible tests  Other tests your healthcare provider may perform include:  · A throat swab to check for bacteria such as streptococcus (the bacteria that causes strep throat)  · A blood test to check for mononucleosis (a viral infection)  · A chest X-ray to rule out pneumonia, especially if you have a cough  Treating a sore throat  Treatment depends on many factors. What is the likely cause? Is the problem recent? Does it keep coming back? In many cases, the best thing to do is to treat the symptoms, rest, and let the problem heal itself. Antibiotics may help clear up some bacterial infections. For cases of severe or recurring tonsillitis, the tonsils may need to be removed.  Relieving your symptoms  · Dont smoke, and avoid secondhand smoke.  · For children, try throat sprays or Popsicles. Adults and older children may try lozenges.  · Drink warm liquids to soothe the throat and help thin mucus. Avoid alcohol, spicy foods, and acidic drinks such as orange juice. These can irritate the throat.  · Gargle with warm saltwater (1 teaspoon of salt to 8 ounces of warm water).  · Use a humidifier to keep air moist and relieve throat dryness.  · Try over-the-counter pain relievers such as acetaminophen or ibuprofen. Use as directed, and dont exceed the recommended dose. Dont give aspirin to children.   Are antibiotics needed?  If your sore throat is due to a bacterial infection, antibiotics may speed healing and prevent complications. Although group A streptococcus ("strep throat" or GAS) is the major treatable infection for a sore throat, GAS causes only 5% to 15% of sore throats in adults who " seek medical care. Most sore throats are caused by cold or flu viruses. And antibiotics dont treat viral illness. In fact, using antibiotics when theyre not needed may produce bacteria that are harder to kill. Your healthcare provider will prescribe antibiotics only if he or she thinks they are likely to help.  If antibiotics are prescribed  Take the medicine exactly as directed. Be sure to finish your prescription even if youre feeling better. And be sure to ask your healthcare provider or pharmacist what side effects are common and what to do about them.  Is surgery needed?  In some cases, tonsils need to be removed. This is often done as outpatient (same-day) surgery. Your healthcare provider may advise removing the tonsils in cases of:  · Several severe bouts of tonsillitis in a year. Severe episodes include those that lead to missed days of school or work, or that need to be treated with antibiotics.  · Tonsillitis that causes breathing problems during sleep  · Tonsillitis caused by food particles collecting in pouches in the tonsils (cryptic tonsillitis)  Call your healthcare provider if any of the following occur:  · Symptoms worsen, or new symptoms develop.  · Swollen tonsils make breathing difficult.  · The pain is severe enough to keep you from drinking liquids.  · A skin rash, hives, or wheezing develops. Any of these could signal an allergic reaction to antibiotics.  · Symptoms dont improve within a week.  · Symptoms dont improve within 2 to 3 days of starting antibiotics.   Date Last Reviewed: 10/1/2016  © 6459-1816 [employer]. [address] [place] [address], [place] [address]. All rights reserved. This information is not intended as a substitute for professional medical care. Always follow your healthcare professional's instructions.        Self-Care for Sore Throats    Sore throats happen for many reasons, such as colds, allergies, and infections caused by viruses or bacteria. In any  case, your throat becomes red and sore. Your goal for self-care is to reduce your discomfort while giving your throat a chance to heal.  Moisten and soothe your throat  Tips include the following:  · Try a sip of water first thing after waking up.  · Keep your throat moist by drinking 6 or more glasses of clear liquids every day.  · Run a cool-air humidifier in your room overnight.  · Avoid cigarette smoke.   · Suck on throat lozenges, cough drops, hard candy, ice chips, or frozen fruit-juice bars. Use the sugar-free versions if your diet or medical condition requires them.  Gargle to ease irritation  Gargling every hour or 2 can ease irritation. Try gargling with 1 of these solutions:  · 1/4 teaspoon of salt in 1/2 cup of warm water  · An over-the-counter anesthetic gargle  Use medicine for more relief  Over-the-counter medicine can reduce sore throat symptoms. Ask your pharmacist if you have questions about which medicine to use:  · Ease pain with anesthetic sprays. Aspirin or an aspirin substitute also helps. Remember, never give aspirin to anyone 18 or younger, or if you are already taking blood thinners.   · For sore throats caused by allergies, try antihistamines to block the allergic reaction.  · Remember: unless a sore throat is caused by a bacterial infection, antibiotics wont help you.  Prevent future sore throats  Prevention tips include the following:  · Stop smoking or reduce contact with secondhand smoke. Smoke irritates the tender throat lining.  · Limit contact with pets and with allergy-causing substances, such as pollen and mold.  · When youre around someone with a sore throat or cold, wash your hands often to keep viruses or bacteria from spreading.  · Dont strain your vocal cords.  Call your healthcare provider  Contact your healthcare provider if you have:  · A temperature over 101°F (38.3°C)  · White spots on the throat  · Great difficulty swallowing  · Trouble breathing  · A skin  rash  · Recent exposure to someone else with strep bacteria  · Severe hoarseness and swollen glands in the neck or jaw   Date Last Reviewed: 8/1/2016  © 5712-4192 [employer]. [address] [place] [address], [place] [address]. All rights reserved. This information is not intended as a substitute for professional medical care. Always follow your healthcare professional's instructions.

## 2018-12-15 NOTE — PATIENT INSTRUCTIONS
· Cough syrup at night as needed. Do not drive or operate machinery while taking this medication as it can cause drowsiness  · Tessalon Perles for daytime cough  · Below are suggestions for symptomatic relief:              -Tylenol every 4 hours OR ibuprofen every 6 hours as needed for pain/fever.              -Salt water gargles to soothe throat pain.              -Chloroseptic spray also helps to numb throat pain.              -Nasal saline spray reduces inflammation and dryness.              -Warm face compresses to help with facial sinus pain/pressure.              -Vicks vapor rub at night.              -Flonase OTC or Nasacort OTC daily for nasal congestion.              -Simple foods like chicken noodle soup.              -Delsym helps with coughing at night              -Zyrtec/Claritin during the day & Benadryl at night may help with allergies.  · If you DO NOT have Hypertension or any history of palpitations, it is ok to take over the counter Sudafed or Mucinex D or Allegra-D or Claritin-D or Zyrtec-D.  · If you do take one of the above, it is ok to combine that with plain over the counter Mucinex or Allegra or Claritin or Zyrtec. If, for example, you are taking Zyrtec -D, you can combine that with Mucinex, but not Mucinex-D.  If you are taking Mucinex-D, you can combine that with plain Allegra or Claritin or Zyrtec.   · If you DO have Hypertension or palpitations, it is safe to take Coricidin HBP for relief of sinus symptoms.  · Only fill antibiotic prescription if symptoms have not improved in 3 days with conservative measures  - Rest.    - Drink plenty of fluids.    - Follow up with your PCP or specialty clinic as directed in the next 1-2 weeks if not improved or as needed.  You can call [phone] to schedule an appointment with the appropriate provider.    · - Go to the ED if your symptoms worsen.  - You must understand that you have received an Urgent Care treatment only and that you may be  released before all of your medical problems are known or treated.   - You, the patient, will arrange for follow up care as instructed.   - If your condition worsens or fails to improve we recommend that you receive another evaluation at the ER immediately or contact your PCP to discuss your concerns or return here.       When You Have a Sore Throat    A sore throat can be painful. There are many reasons why you may have a sore throat. Your healthcare provider will work with you to find the cause of your sore throat. He or she will also find the best treatment for you.  What causes a sore throat?  Sore throats can be caused or worsened by:  · Cold or flu viruses  · Bacteria  · Irritants such as tobacco smoke or air pollution  · Acid reflux  A healthy throat  The tonsils are on the sides of the throat near the base of the tongue. They collect viruses and bacteria and help fight infection. The throat (pharynx) is the passage for air. Mucus from the nasal cavity also moves down the passage.  An inflamed throat  The tonsils and pharynx can become inflamed due to a cold or flu virus. Postnasal drip (excess mucus draining from the nasal cavity) can irritate the throat. It can also make the throat or tonsils more likely to be infected by bacteria. Severe, untreated tonsillitis in children or adults can cause a pocket of pus (abscess) to form near the tonsil.  Your evaluation  A medical evaluation can help find the cause of your sore throat. It can also help your healthcare provider choose the best treatment for you. The evaluation may include a health history, physical exam, and diagnostic tests.  Health history  Your healthcare provider may ask you the following:  · How long has the sore throat lasted and how have you been treating it?  · Do you have any other symptoms, such as body aches, fever, or cough?  · Does your sore throat recur? If so, how often? How many days of school or work have you missed because of a sore  "throat?  · Do you have trouble eating or swallowing?  · Have you been told that you snore or have other sleep problems?  · Do you have bad breath?  · Do you cough up bad-tasting mucus?  Physical exam  During the exam, your healthcare provider checks your ears, nose, and throat for problems. He or she also checks for swelling in the neck, and may listen to your chest.  Possible tests  Other tests your healthcare provider may perform include:  · A throat swab to check for bacteria such as streptococcus (the bacteria that causes strep throat)  · A blood test to check for mononucleosis (a viral infection)  · A chest X-ray to rule out pneumonia, especially if you have a cough  Treating a sore throat  Treatment depends on many factors. What is the likely cause? Is the problem recent? Does it keep coming back? In many cases, the best thing to do is to treat the symptoms, rest, and let the problem heal itself. Antibiotics may help clear up some bacterial infections. For cases of severe or recurring tonsillitis, the tonsils may need to be removed.  Relieving your symptoms  · Dont smoke, and avoid secondhand smoke.  · For children, try throat sprays or Popsicles. Adults and older children may try lozenges.  · Drink warm liquids to soothe the throat and help thin mucus. Avoid alcohol, spicy foods, and acidic drinks such as orange juice. These can irritate the throat.  · Gargle with warm saltwater (1 teaspoon of salt to 8 ounces of warm water).  · Use a humidifier to keep air moist and relieve throat dryness.  · Try over-the-counter pain relievers such as acetaminophen or ibuprofen. Use as directed, and dont exceed the recommended dose. Dont give aspirin to children.   Are antibiotics needed?  If your sore throat is due to a bacterial infection, antibiotics may speed healing and prevent complications. Although group A streptococcus ("strep throat" or GAS) is the major treatable infection for a sore throat, GAS causes only 5% " to 15% of sore throats in adults who seek medical care. Most sore throats are caused by cold or flu viruses. And antibiotics dont treat viral illness. In fact, using antibiotics when theyre not needed may produce bacteria that are harder to kill. Your healthcare provider will prescribe antibiotics only if he or she thinks they are likely to help.  If antibiotics are prescribed  Take the medicine exactly as directed. Be sure to finish your prescription even if youre feeling better. And be sure to ask your healthcare provider or pharmacist what side effects are common and what to do about them.  Is surgery needed?  In some cases, tonsils need to be removed. This is often done as outpatient (same-day) surgery. Your healthcare provider may advise removing the tonsils in cases of:  · Several severe bouts of tonsillitis in a year. Severe episodes include those that lead to missed days of school or work, or that need to be treated with antibiotics.  · Tonsillitis that causes breathing problems during sleep  · Tonsillitis caused by food particles collecting in pouches in the tonsils (cryptic tonsillitis)  Call your healthcare provider if any of the following occur:  · Symptoms worsen, or new symptoms develop.  · Swollen tonsils make breathing difficult.  · The pain is severe enough to keep you from drinking liquids.  · A skin rash, hives, or wheezing develops. Any of these could signal an allergic reaction to antibiotics.  · Symptoms dont improve within a week.  · Symptoms dont improve within 2 to 3 days of starting antibiotics.   Date Last Reviewed: 10/1/2016  © 0426-9246 [employer]. [address] [place] [address], [place] [address]. All rights reserved. This information is not intended as a substitute for professional medical care. Always follow your healthcare professional's instructions.        Self-Care for Sore Throats    Sore throats happen for many reasons, such as colds, allergies, and infections  caused by viruses or bacteria. In any case, your throat becomes red and sore. Your goal for self-care is to reduce your discomfort while giving your throat a chance to heal.  Moisten and soothe your throat  Tips include the following:  · Try a sip of water first thing after waking up.  · Keep your throat moist by drinking 6 or more glasses of clear liquids every day.  · Run a cool-air humidifier in your room overnight.  · Avoid cigarette smoke.   · Suck on throat lozenges, cough drops, hard candy, ice chips, or frozen fruit-juice bars. Use the sugar-free versions if your diet or medical condition requires them.  Gargle to ease irritation  Gargling every hour or 2 can ease irritation. Try gargling with 1 of these solutions:  · 1/4 teaspoon of salt in 1/2 cup of warm water  · An over-the-counter anesthetic gargle  Use medicine for more relief  Over-the-counter medicine can reduce sore throat symptoms. Ask your pharmacist if you have questions about which medicine to use:  · Ease pain with anesthetic sprays. Aspirin or an aspirin substitute also helps. Remember, never give aspirin to anyone 18 or younger, or if you are already taking blood thinners.   · For sore throats caused by allergies, try antihistamines to block the allergic reaction.  · Remember: unless a sore throat is caused by a bacterial infection, antibiotics wont help you.  Prevent future sore throats  Prevention tips include the following:  · Stop smoking or reduce contact with secondhand smoke. Smoke irritates the tender throat lining.  · Limit contact with pets and with allergy-causing substances, such as pollen and mold.  · When youre around someone with a sore throat or cold, wash your hands often to keep viruses or bacteria from spreading.  · Dont strain your vocal cords.  Call your healthcare provider  Contact your healthcare provider if you have:  · A temperature over 101°F (38.3°C)  · White spots on the throat  · Great difficulty  swallowing  · Trouble breathing  · A skin rash  · Recent exposure to someone else with strep bacteria  · Severe hoarseness and swollen glands in the neck or jaw   Date Last Reviewed: 8/1/2016  © 3559-5928 [employer]. [address] [place] [address], [place] [address]. All rights reserved. This information is not intended as a substitute for professional medical care. Always follow your healthcare professional's instructions.

## 2018-12-20 ENCOUNTER — TELEPHONE (OUTPATIENT)
Dept: URGENT CARE | Facility: CLINIC | Age: 63
End: 2018-12-20

## 2018-12-20 LAB — S PYO THROAT QL CULT: NEGATIVE

## 2018-12-20 NOTE — TELEPHONE ENCOUNTER
----- Message from Stephon Trujillo NP sent at 12/20/2018  8:44 AM CST -----  Please call pt with normal results- negative for strep throat on culture. Ask how the patient is feeling

## 2018-12-24 ENCOUNTER — TELEPHONE (OUTPATIENT)
Dept: URGENT CARE | Facility: CLINIC | Age: 63
End: 2018-12-24

## 2019-04-01 ENCOUNTER — TELEPHONE (OUTPATIENT)
Dept: OPHTHALMOLOGY | Facility: CLINIC | Age: 64
End: 2019-04-01

## 2019-04-01 NOTE — TELEPHONE ENCOUNTER
Called pt regarding message received got voice recorder left message scheduled appt on 04/22/19 @ 8:15 a.m. Any question or concerns call office at [phone].

## 2019-04-01 NOTE — TELEPHONE ENCOUNTER
----- Message from Najma Pa sent at 4/1/2019  2:49 PM CDT -----  Contact: PT Portal Request  Appointment Request From: Riley Marcus    With Provider: Harpreet Olson MD [[facility]]    Preferred Date Range: 4/22/2019 - 4/24/2019    Preferred Times: Monday Morning    Reason for visit: Existing Patient    Comments:  Followup for eye injections

## 2019-04-22 ENCOUNTER — OFFICE VISIT (OUTPATIENT)
Dept: OPHTHALMOLOGY | Facility: CLINIC | Age: 64
End: 2019-04-22
Attending: OPHTHALMOLOGY
Payer: COMMERCIAL

## 2019-04-22 VITALS — HEART RATE: 61 BPM | DIASTOLIC BLOOD PRESSURE: 87 MMHG | SYSTOLIC BLOOD PRESSURE: 161 MMHG

## 2019-04-22 DIAGNOSIS — H34.8322 STABLE BRANCH RETINAL VEIN OCCLUSION OF LEFT EYE: ICD-10-CM

## 2019-04-22 DIAGNOSIS — G43.109 OPHTHALMIC MIGRAINE: ICD-10-CM

## 2019-04-22 DIAGNOSIS — H35.412 LATTICE DEGENERATION, LEFT: Primary | ICD-10-CM

## 2019-04-22 DIAGNOSIS — H25.13 NUCLEAR SCLEROSIS OF BOTH EYES: ICD-10-CM

## 2019-04-22 PROCEDURE — 99999 PR PBB SHADOW E&M-EST. PATIENT-LVL III: ICD-10-PCS | Mod: PBBFAC,,, | Performed by: OPHTHALMOLOGY

## 2019-04-22 PROCEDURE — 92014 PR EYE EXAM, EST PATIENT,COMPREHESV: ICD-10-PCS | Mod: S$GLB,,, | Performed by: OPHTHALMOLOGY

## 2019-04-22 PROCEDURE — 92226 PR SPECIAL EYE EXAM, SUBSEQUENT: CPT | Mod: LT,S$GLB,, | Performed by: OPHTHALMOLOGY

## 2019-04-22 PROCEDURE — 92014 COMPRE OPH EXAM EST PT 1/>: CPT | Mod: S$GLB,,, | Performed by: OPHTHALMOLOGY

## 2019-04-22 PROCEDURE — 92134 CPTRZ OPH DX IMG PST SGM RTA: CPT | Mod: S$GLB,,, | Performed by: OPHTHALMOLOGY

## 2019-04-22 PROCEDURE — 92226 PR SPECIAL EYE EXAM, SUBSEQUENT: ICD-10-PCS | Mod: LT,S$GLB,, | Performed by: OPHTHALMOLOGY

## 2019-04-22 PROCEDURE — 99999 PR PBB SHADOW E&M-EST. PATIENT-LVL III: CPT | Mod: PBBFAC,,, | Performed by: OPHTHALMOLOGY

## 2019-04-22 PROCEDURE — 92134 POSTERIOR SEGMENT OCT RETINA (OCULAR COHERENCE TOMOGRAPHY)-BOTH EYES: ICD-10-PCS | Mod: S$GLB,,, | Performed by: OPHTHALMOLOGY

## 2019-04-22 NOTE — PROGRESS NOTES
Subjective:       Patient ID: Riley Marcus is a 64 y.o. female      Chief Complaint   Patient presents with    6 mo BRVO w/ ME     History of Present Illness  HPI     DLS 10/17/18 by Dr. TY Olson MD    Pt here for f/u BRVO with ME.     Pt states'  I had 1 episode of bright lights and obscured vision (seemed   OS) 1 mo ago. Episode including bright light lasted x 10-15 minutes.  No   HA.  Resolved without intervention and has not returned.  Otherwise Va good OU.  No f/f/pain OU      Pt says has occ blur and asking if she should have touch up of Lasik      Eye Med()s) - none       POHx:   1. BRVO OS w/ ME     S/P Avastin OS x 4 (05/28/2018)     Last edited by Harpreet Olson MD on 4/22/2019  9:14 AM. (History)        Imaging:    See report    Assessment/Plan:     1. Branch retinal vein occlusion of left eye with macular edema  ME resolved.  Last injection May 2018  CV risk factor control  Observe    - Posterior Segment OCT Retina-Both eyes    2. Lattice degeneration, left  Stable.  No breaks.  RD precautions discussed    3. Nuclear sclerosis of both eyes  Excellent Va.  Do not recommend refractive surgery touchup as refraction likely to change.  Observe    4. Ophthalmic migraine  Discussed difference between migraine-type and retinal symptoms.  See PMD if recurs    Follow up in about 1 year (around 4/22/2020), or if symptoms worsen or fail to improve, for Dr Bloom.   followup with me PRN (followup could be with me also if pt wishes)

## 2019-05-28 ENCOUNTER — PATIENT MESSAGE (OUTPATIENT)
Dept: CARDIOLOGY | Facility: CLINIC | Age: 64
End: 2019-05-28

## 2019-05-31 ENCOUNTER — PATIENT MESSAGE (OUTPATIENT)
Dept: CARDIOLOGY | Facility: CLINIC | Age: 64
End: 2019-05-31

## 2019-05-31 DIAGNOSIS — R07.9 CHEST PAIN IN ADULT: ICD-10-CM

## 2019-05-31 DIAGNOSIS — I10 ESSENTIAL HYPERTENSION: Primary | ICD-10-CM

## 2019-05-31 DIAGNOSIS — R06.02 SHORTNESS OF BREATH: ICD-10-CM

## 2019-05-31 DIAGNOSIS — I49.3 FREQUENT PVCS: ICD-10-CM

## 2019-06-03 ENCOUNTER — PATIENT MESSAGE (OUTPATIENT)
Dept: CARDIOLOGY | Facility: CLINIC | Age: 64
End: 2019-06-03

## 2019-06-03 ENCOUNTER — LAB VISIT (OUTPATIENT)
Dept: LAB | Facility: HOSPITAL | Age: 64
End: 2019-06-03
Attending: INTERNAL MEDICINE
Payer: COMMERCIAL

## 2019-06-03 DIAGNOSIS — R06.02 SHORTNESS OF BREATH: ICD-10-CM

## 2019-06-03 DIAGNOSIS — R07.9 CHEST PAIN IN ADULT: ICD-10-CM

## 2019-06-03 DIAGNOSIS — I49.3 FREQUENT PVCS: ICD-10-CM

## 2019-06-03 DIAGNOSIS — I10 ESSENTIAL HYPERTENSION: ICD-10-CM

## 2019-06-03 LAB
ALBUMIN SERPL BCP-MCNC: 3.9 G/DL (ref 3.5–5.2)
ALP SERPL-CCNC: 69 U/L (ref 55–135)
ALT SERPL W/O P-5'-P-CCNC: 28 U/L (ref 10–44)
ANION GAP SERPL CALC-SCNC: 5 MMOL/L (ref 8–16)
AST SERPL-CCNC: 22 U/L (ref 10–40)
BASOPHILS # BLD AUTO: 0.03 K/UL (ref 0–0.2)
BASOPHILS NFR BLD: 0.6 % (ref 0–1.9)
BILIRUB SERPL-MCNC: 0.4 MG/DL (ref 0.1–1)
BUN SERPL-MCNC: 31 MG/DL (ref 8–23)
CALCIUM SERPL-MCNC: 10.9 MG/DL (ref 8.7–10.5)
CHLORIDE SERPL-SCNC: 103 MMOL/L (ref 95–110)
CHOLEST SERPL-MCNC: 205 MG/DL (ref 120–199)
CHOLEST/HDLC SERPL: 3.7 {RATIO} (ref 2–5)
CO2 SERPL-SCNC: 31 MMOL/L (ref 23–29)
CREAT SERPL-MCNC: 1.5 MG/DL (ref 0.5–1.4)
DIFFERENTIAL METHOD: NORMAL
EOSINOPHIL # BLD AUTO: 0.2 K/UL (ref 0–0.5)
EOSINOPHIL NFR BLD: 3 % (ref 0–8)
ERYTHROCYTE [DISTWIDTH] IN BLOOD BY AUTOMATED COUNT: 13.9 % (ref 11.5–14.5)
EST. GFR  (AFRICAN AMERICAN): 42 ML/MIN/1.73 M^2
EST. GFR  (NON AFRICAN AMERICAN): 37 ML/MIN/1.73 M^2
GLUCOSE SERPL-MCNC: 112 MG/DL (ref 70–110)
HCT VFR BLD AUTO: 41.6 % (ref 37–48.5)
HDLC SERPL-MCNC: 56 MG/DL (ref 40–75)
HDLC SERPL: 27.3 % (ref 20–50)
HGB BLD-MCNC: 13.5 G/DL (ref 12–16)
LDLC SERPL CALC-MCNC: 118.6 MG/DL (ref 63–159)
LYMPHOCYTES # BLD AUTO: 1.4 K/UL (ref 1–4.8)
LYMPHOCYTES NFR BLD: 26.7 % (ref 18–48)
MCH RBC QN AUTO: 29.5 PG (ref 27–31)
MCHC RBC AUTO-ENTMCNC: 32.5 G/DL (ref 32–36)
MCV RBC AUTO: 91 FL (ref 82–98)
MONOCYTES # BLD AUTO: 0.4 K/UL (ref 0.3–1)
MONOCYTES NFR BLD: 8 % (ref 4–15)
NEUTROPHILS # BLD AUTO: 3.2 K/UL (ref 1.8–7.7)
NEUTROPHILS NFR BLD: 61.7 % (ref 38–73)
NONHDLC SERPL-MCNC: 149 MG/DL
PLATELET # BLD AUTO: 233 K/UL (ref 150–350)
PMV BLD AUTO: 11 FL (ref 9.2–12.9)
POTASSIUM SERPL-SCNC: 4.9 MMOL/L (ref 3.5–5.1)
PROT SERPL-MCNC: 7.9 G/DL (ref 6–8.4)
RBC # BLD AUTO: 4.58 M/UL (ref 4–5.4)
SODIUM SERPL-SCNC: 139 MMOL/L (ref 136–145)
TRIGL SERPL-MCNC: 152 MG/DL (ref 30–150)
TSH SERPL DL<=0.005 MIU/L-ACNC: 1.7 UIU/ML (ref 0.4–4)
URATE SERPL-MCNC: 8.2 MG/DL (ref 2.4–5.7)
WBC # BLD AUTO: 5.25 K/UL (ref 3.9–12.7)

## 2019-06-03 PROCEDURE — 84443 ASSAY THYROID STIM HORMONE: CPT

## 2019-06-03 PROCEDURE — 84550 ASSAY OF BLOOD/URIC ACID: CPT

## 2019-06-03 PROCEDURE — 85025 COMPLETE CBC W/AUTO DIFF WBC: CPT

## 2019-06-03 PROCEDURE — 80061 LIPID PANEL: CPT

## 2019-06-03 PROCEDURE — 80053 COMPREHEN METABOLIC PANEL: CPT

## 2019-06-03 PROCEDURE — 36415 COLL VENOUS BLD VENIPUNCTURE: CPT

## 2019-06-04 ENCOUNTER — OFFICE VISIT (OUTPATIENT)
Dept: CARDIOLOGY | Facility: CLINIC | Age: 64
End: 2019-06-04
Payer: COMMERCIAL

## 2019-06-04 VITALS
OXYGEN SATURATION: 100 % | SYSTOLIC BLOOD PRESSURE: 167 MMHG | HEIGHT: 62 IN | WEIGHT: 176.38 LBS | HEART RATE: 61 BPM | DIASTOLIC BLOOD PRESSURE: 87 MMHG | BODY MASS INDEX: 32.46 KG/M2

## 2019-06-04 DIAGNOSIS — I49.3 FREQUENT PVCS: ICD-10-CM

## 2019-06-04 DIAGNOSIS — R07.9 CHEST PAIN IN ADULT: ICD-10-CM

## 2019-06-04 DIAGNOSIS — I10 ESSENTIAL HYPERTENSION: Primary | ICD-10-CM

## 2019-06-04 DIAGNOSIS — I10 HTN (HYPERTENSION): ICD-10-CM

## 2019-06-04 PROCEDURE — 3008F BODY MASS INDEX DOCD: CPT | Mod: CPTII,S$GLB,, | Performed by: INTERNAL MEDICINE

## 2019-06-04 PROCEDURE — 99999 PR PBB SHADOW E&M-EST. PATIENT-LVL IV: CPT | Mod: PBBFAC,,, | Performed by: INTERNAL MEDICINE

## 2019-06-04 PROCEDURE — 3008F PR BODY MASS INDEX (BMI) DOCUMENTED: ICD-10-PCS | Mod: CPTII,S$GLB,, | Performed by: INTERNAL MEDICINE

## 2019-06-04 PROCEDURE — 93000 EKG 12-LEAD: ICD-10-PCS | Mod: S$GLB,,, | Performed by: INTERNAL MEDICINE

## 2019-06-04 PROCEDURE — 3077F SYST BP >= 140 MM HG: CPT | Mod: CPTII,S$GLB,, | Performed by: INTERNAL MEDICINE

## 2019-06-04 PROCEDURE — 99999 PR PBB SHADOW E&M-EST. PATIENT-LVL IV: ICD-10-PCS | Mod: PBBFAC,,, | Performed by: INTERNAL MEDICINE

## 2019-06-04 PROCEDURE — 99214 OFFICE O/P EST MOD 30 MIN: CPT | Mod: S$GLB,,, | Performed by: INTERNAL MEDICINE

## 2019-06-04 PROCEDURE — 3079F DIAST BP 80-89 MM HG: CPT | Mod: CPTII,S$GLB,, | Performed by: INTERNAL MEDICINE

## 2019-06-04 PROCEDURE — 93000 ELECTROCARDIOGRAM COMPLETE: CPT | Mod: S$GLB,,, | Performed by: INTERNAL MEDICINE

## 2019-06-04 PROCEDURE — 99214 PR OFFICE/OUTPT VISIT, EST, LEVL IV, 30-39 MIN: ICD-10-PCS | Mod: S$GLB,,, | Performed by: INTERNAL MEDICINE

## 2019-06-04 PROCEDURE — 3077F PR MOST RECENT SYSTOLIC BLOOD PRESSURE >= 140 MM HG: ICD-10-PCS | Mod: CPTII,S$GLB,, | Performed by: INTERNAL MEDICINE

## 2019-06-04 PROCEDURE — 3079F PR MOST RECENT DIASTOLIC BLOOD PRESSURE 80-89 MM HG: ICD-10-PCS | Mod: CPTII,S$GLB,, | Performed by: INTERNAL MEDICINE

## 2019-06-04 RX ORDER — TRIAMTERENE AND HYDROCHLOROTHIAZIDE 37.5; 25 MG/1; MG/1
1 CAPSULE ORAL DAILY
Qty: 90 CAPSULE | Refills: 3 | Status: SHIPPED | OUTPATIENT
Start: 2019-06-04 | End: 2020-09-18 | Stop reason: SDUPTHER

## 2019-06-04 RX ORDER — CHOLECALCIFEROL (VITAMIN D3) 25 MCG
1000 TABLET ORAL DAILY
COMMUNITY
End: 2023-05-08

## 2019-06-04 RX ORDER — METOPROLOL SUCCINATE 25 MG/1
25 TABLET, EXTENDED RELEASE ORAL DAILY
Qty: 90 TABLET | Refills: 3 | Status: SHIPPED | OUTPATIENT
Start: 2019-06-04 | End: 2020-06-19 | Stop reason: SDUPTHER

## 2019-06-04 RX ORDER — LISINOPRIL 40 MG/1
40 TABLET ORAL DAILY
Qty: 90 TABLET | Refills: 3 | Status: SHIPPED | OUTPATIENT
Start: 2019-06-04 | End: 2020-09-18 | Stop reason: SDUPTHER

## 2019-06-04 NOTE — PROGRESS NOTES
Subjective:    Patient ID:  Riley Marcus is a 64 y.o. female who presents for follow-up of Hypertension      HPI     HTN, palpitations - frequent PVCs     Stress test 6/25/15  1. The EKG portion of this study is negative for ischemia at a moderate workload, and peak heart rate of 171 bpm (111% of predicted).   2. Blood pressure response to exercise was normal (Presenting BP: 147/95 Peak BP: 206/101).   3. No significant arrhythmias were present.      Echo 4/27/18    1 - Normal left ventricular systolic function (EF 55-60%).     2 - Concentric hypertrophy.     3 - Indeterminate LV diastolic function.      Holter 4/27/18  1. Sinus rhythm with heart rates varying between 55 and 120 bpm with an average of 79 bpm.     VENTRICULAR ARRHYTHMIAS  1. There were very frequent PVCs totalling 8416 and averaging 350 per hour.     2. There were no episodes of ventricular tachycardia.    SUPRA VENTRICULAR ARRHYTHMIAS  1. There were frequent PACs totalling 1440 and averaging 60 per hour.     2. There were no episodes of sustained supraventricular tachycardia.    SINUS NODE FUNCTION  1. There was no evidence of high grade SA fr[place] block.     AV CONDUCTION  1. There was no evidence of high grade AV block.      4/23/18 BP elevated today - ran out of lisinopril 1 week ago  Denies CP or SOB  Occasional palpitations  EKG NSR - ok    BP controlled by home readings  Denies CP or SOB  EKG NSR - ok    Review of Systems   Constitution: Negative for decreased appetite.   HENT: Negative for ear discharge.    Eyes: Negative for blurred vision.   Respiratory: Negative for hemoptysis.    Endocrine: Negative for polyphagia.   Hematologic/Lymphatic: Negative for adenopathy.   Skin: Negative for color change.   Musculoskeletal: Negative for joint swelling.   Genitourinary: Negative for bladder incontinence.   Neurological: Negative for brief paralysis.   Psychiatric/Behavioral: Negative for hallucinations.   Allergic/Immunologic: Negative for  hives.        Objective:    Physical Exam   Constitutional: She is oriented to person, place, and time. She appears well-developed and well-nourished.   HENT:   Head: Normocephalic and atraumatic.   Eyes: Pupils are equal, round, and reactive to light. Conjunctivae are normal.   Neck: Normal range of motion. Neck supple.   Cardiovascular: Normal rate, normal heart sounds and intact distal pulses.   Pulmonary/Chest: Effort normal and breath sounds normal.   Abdominal: Soft. Bowel sounds are normal.   Musculoskeletal: Normal range of motion.   Neurological: She is alert and oriented to person, place, and time.   Skin: Skin is warm and dry.         Assessment:       1. Essential hypertension    2. Frequent PVCs    3. Chest pain in adult         Plan:       Cardiac stable  OV 1 year

## 2019-10-02 DIAGNOSIS — M54.16 LUMBAR RADICULOPATHY: Primary | ICD-10-CM

## 2019-10-21 ENCOUNTER — HOSPITAL ENCOUNTER (OUTPATIENT)
Dept: RADIOLOGY | Facility: HOSPITAL | Age: 64
Discharge: HOME | End: 2019-10-21
Attending: ORTHOPAEDIC SURGERY
Payer: COMMERCIAL

## 2019-10-21 DIAGNOSIS — M54.16 LUMBAR RADICULOPATHY: ICD-10-CM

## 2019-10-21 PROCEDURE — 72148 MRI LUMBAR SPINE W/O DYE: CPT | Mod: 26,,, | Performed by: RADIOLOGY

## 2019-10-21 PROCEDURE — 72148 MRI LUMBAR SPINE WITHOUT CONTRAST: ICD-10-PCS | Mod: 26,,, | Performed by: RADIOLOGY

## 2019-10-21 PROCEDURE — 72148 MRI LUMBAR SPINE W/O DYE: CPT | Mod: TC

## 2020-04-21 DIAGNOSIS — Z01.84 ANTIBODY RESPONSE EXAMINATION: ICD-10-CM

## 2020-04-22 ENCOUNTER — LAB VISIT (OUTPATIENT)
Dept: LAB | Facility: HOSPITAL | Age: 65
End: 2020-04-22
Attending: INTERNAL MEDICINE
Payer: COMMERCIAL

## 2020-04-22 DIAGNOSIS — Z01.84 ANTIBODY RESPONSE EXAMINATION: ICD-10-CM

## 2020-04-22 LAB — SARS-COV-2 IGG SERPL QL IA: NEGATIVE

## 2020-04-22 PROCEDURE — 36415 COLL VENOUS BLD VENIPUNCTURE: CPT

## 2020-04-22 PROCEDURE — 86769 SARS-COV-2 COVID-19 ANTIBODY: CPT

## 2020-06-21 RX ORDER — METOPROLOL SUCCINATE 25 MG/1
25 TABLET, EXTENDED RELEASE ORAL DAILY
Qty: 90 TABLET | Refills: 3 | Status: SHIPPED | OUTPATIENT
Start: 2020-06-21 | End: 2020-09-18 | Stop reason: SDUPTHER

## 2020-09-18 ENCOUNTER — OFFICE VISIT (OUTPATIENT)
Dept: CARDIOLOGY | Facility: CLINIC | Age: 65
End: 2020-09-18
Payer: COMMERCIAL

## 2020-09-18 VITALS
BODY MASS INDEX: 32.86 KG/M2 | DIASTOLIC BLOOD PRESSURE: 74 MMHG | HEART RATE: 70 BPM | OXYGEN SATURATION: 100 % | SYSTOLIC BLOOD PRESSURE: 120 MMHG | HEIGHT: 62 IN | WEIGHT: 178.56 LBS

## 2020-09-18 DIAGNOSIS — I49.3 FREQUENT PVCS: ICD-10-CM

## 2020-09-18 DIAGNOSIS — R06.02 SHORTNESS OF BREATH: ICD-10-CM

## 2020-09-18 DIAGNOSIS — I10 HYPERTENSION: ICD-10-CM

## 2020-09-18 DIAGNOSIS — R07.9 CHEST PAIN IN ADULT: ICD-10-CM

## 2020-09-18 DIAGNOSIS — I10 ESSENTIAL HYPERTENSION: Primary | ICD-10-CM

## 2020-09-18 PROCEDURE — 93000 EKG 12-LEAD: ICD-10-PCS | Mod: S$GLB,,, | Performed by: INTERNAL MEDICINE

## 2020-09-18 PROCEDURE — 3078F PR MOST RECENT DIASTOLIC BLOOD PRESSURE < 80 MM HG: ICD-10-PCS | Mod: CPTII,S$GLB,, | Performed by: INTERNAL MEDICINE

## 2020-09-18 PROCEDURE — 3074F PR MOST RECENT SYSTOLIC BLOOD PRESSURE < 130 MM HG: ICD-10-PCS | Mod: CPTII,S$GLB,, | Performed by: INTERNAL MEDICINE

## 2020-09-18 PROCEDURE — 93000 ELECTROCARDIOGRAM COMPLETE: CPT | Mod: S$GLB,,, | Performed by: INTERNAL MEDICINE

## 2020-09-18 PROCEDURE — 3008F PR BODY MASS INDEX (BMI) DOCUMENTED: ICD-10-PCS | Mod: CPTII,S$GLB,, | Performed by: INTERNAL MEDICINE

## 2020-09-18 PROCEDURE — 3074F SYST BP LT 130 MM HG: CPT | Mod: CPTII,S$GLB,, | Performed by: INTERNAL MEDICINE

## 2020-09-18 PROCEDURE — 99214 PR OFFICE/OUTPT VISIT, EST, LEVL IV, 30-39 MIN: ICD-10-PCS | Mod: S$GLB,,, | Performed by: INTERNAL MEDICINE

## 2020-09-18 PROCEDURE — 3078F DIAST BP <80 MM HG: CPT | Mod: CPTII,S$GLB,, | Performed by: INTERNAL MEDICINE

## 2020-09-18 PROCEDURE — 99999 PR PBB SHADOW E&M-EST. PATIENT-LVL IV: ICD-10-PCS | Mod: PBBFAC,,, | Performed by: INTERNAL MEDICINE

## 2020-09-18 PROCEDURE — 99214 OFFICE O/P EST MOD 30 MIN: CPT | Mod: S$GLB,,, | Performed by: INTERNAL MEDICINE

## 2020-09-18 PROCEDURE — 3008F BODY MASS INDEX DOCD: CPT | Mod: CPTII,S$GLB,, | Performed by: INTERNAL MEDICINE

## 2020-09-18 PROCEDURE — 99999 PR PBB SHADOW E&M-EST. PATIENT-LVL IV: CPT | Mod: PBBFAC,,, | Performed by: INTERNAL MEDICINE

## 2020-09-18 RX ORDER — LISINOPRIL 40 MG/1
40 TABLET ORAL DAILY
Qty: 90 TABLET | Refills: 3 | Status: SHIPPED | OUTPATIENT
Start: 2020-09-18 | End: 2021-09-14 | Stop reason: SDUPTHER

## 2020-09-18 RX ORDER — METOPROLOL SUCCINATE 25 MG/1
25 TABLET, EXTENDED RELEASE ORAL DAILY
Qty: 90 TABLET | Refills: 3 | Status: SHIPPED | OUTPATIENT
Start: 2020-09-18 | End: 2021-09-14 | Stop reason: SDUPTHER

## 2020-09-18 RX ORDER — TRIAMTERENE AND HYDROCHLOROTHIAZIDE 37.5; 25 MG/1; MG/1
1 CAPSULE ORAL DAILY
Qty: 90 CAPSULE | Refills: 3 | Status: SHIPPED | OUTPATIENT
Start: 2020-09-18 | End: 2021-09-14 | Stop reason: SDUPTHER

## 2020-09-18 NOTE — PROGRESS NOTES
Subjective:    Patient ID:  Riley Marcus is a 65 y.o. female who presents for follow-up of Hypertension      HPI     HTN, palpitations - frequent PVCs     Stress test 6/25/15  1. The EKG portion of this study is negative for ischemia at a moderate workload, and peak heart rate of 171 bpm (111% of predicted).   2. Blood pressure response to exercise was normal (Presenting BP: 147/95 Peak BP: 206/101).   3. No significant arrhythmias were present.      Echo 4/27/18    1 - Normal left ventricular systolic function (EF 55-60%).     2 - Concentric hypertrophy.     3 - Indeterminate LV diastolic function.      Holter 4/27/18  1. Sinus rhythm with heart rates varying between 55 and 120 bpm with an average of 79 bpm.     VENTRICULAR ARRHYTHMIAS  1. There were very frequent PVCs totalling 8416 and averaging 350 per hour.     2. There were no episodes of ventricular tachycardia.    SUPRA VENTRICULAR ARRHYTHMIAS  1. There were frequent PACs totalling 1440 and averaging 60 per hour.     2. There were no episodes of sustained supraventricular tachycardia.    SINUS NODE FUNCTION  1. There was no evidence of high grade SA [place] block.     AV CONDUCTION  1. There was no evidence of high grade AV block.      4/23/18 BP elevated today - ran out of lisinopril 1 week ago  Denies CP or SOB  Occasional palpitations  EKG NSR - ok     6/4/19 BP controlled by home readings  Denies CP or SOB  EKG NSR - ok     9/18/20 Denies CP or SOB. Rarely gets palpitations  BP well controlled  EKG NSR - ok    Review of Systems   Constitution: Negative for decreased appetite.   HENT: Negative for ear discharge.    Eyes: Negative for blurred vision.   Respiratory: Negative for hemoptysis.    Endocrine: Negative for polyphagia.   Hematologic/Lymphatic: Negative for adenopathy.   Skin: Negative for color change.   Musculoskeletal: Negative for joint swelling.   Genitourinary: Negative for bladder incontinence.   Neurological: Negative for brief  paralysis.   Psychiatric/Behavioral: Negative for hallucinations.   Allergic/Immunologic: Negative for hives.        Objective:    Physical Exam   Constitutional: She is oriented to person, place, and time. She appears well-developed and well-nourished.   HENT:   Head: Normocephalic and atraumatic.   Eyes: Pupils are equal, round, and reactive to light. Conjunctivae are normal.   Neck: Normal range of motion. Neck supple.   Cardiovascular: Normal rate, normal heart sounds and intact distal pulses.   Pulmonary/Chest: Effort normal and breath sounds normal.   Abdominal: Soft. Bowel sounds are normal.   Musculoskeletal: Normal range of motion.   Neurological: She is alert and oriented to person, place, and time.   Skin: Skin is warm and dry.         Assessment:       1. Essential hypertension    2. Frequent PVCs    3. Chest pain in adult    4. Shortness of breath         Plan:       Cardiac stable  Continue Rx for HTN and palpitations  OV 1 year

## 2020-09-29 ENCOUNTER — PATIENT MESSAGE (OUTPATIENT)
Dept: OTHER | Facility: OTHER | Age: 65
End: 2020-09-29

## 2020-11-03 ENCOUNTER — OFFICE VISIT (OUTPATIENT)
Dept: OBSTETRICS AND GYNECOLOGY | Facility: CLINIC | Age: 65
End: 2020-11-03
Payer: COMMERCIAL

## 2020-11-03 VITALS — WEIGHT: 179 LBS | DIASTOLIC BLOOD PRESSURE: 96 MMHG | BODY MASS INDEX: 32.74 KG/M2 | SYSTOLIC BLOOD PRESSURE: 178 MMHG

## 2020-11-03 DIAGNOSIS — N60.09 CYST OF BREAST, UNSPECIFIED LATERALITY: ICD-10-CM

## 2020-11-03 DIAGNOSIS — Z01.419 ENCOUNTER FOR GYNECOLOGICAL EXAMINATION WITHOUT ABNORMAL FINDING: ICD-10-CM

## 2020-11-03 DIAGNOSIS — N60.19 FIBROCYSTIC BREAST CHANGES, UNSPECIFIED LATERALITY: ICD-10-CM

## 2020-11-03 DIAGNOSIS — Z12.31 VISIT FOR SCREENING MAMMOGRAM: Primary | ICD-10-CM

## 2020-11-03 DIAGNOSIS — I10 ESSENTIAL HYPERTENSION: ICD-10-CM

## 2020-11-03 PROCEDURE — 3077F SYST BP >= 140 MM HG: CPT | Mod: CPTII,S$GLB,, | Performed by: OBSTETRICS & GYNECOLOGY

## 2020-11-03 PROCEDURE — 3008F BODY MASS INDEX DOCD: CPT | Mod: CPTII,S$GLB,, | Performed by: OBSTETRICS & GYNECOLOGY

## 2020-11-03 PROCEDURE — 99999 PR PBB SHADOW E&M-EST. PATIENT-LVL IV: CPT | Mod: PBBFAC,,, | Performed by: OBSTETRICS & GYNECOLOGY

## 2020-11-03 PROCEDURE — 3077F PR MOST RECENT SYSTOLIC BLOOD PRESSURE >= 140 MM HG: ICD-10-PCS | Mod: CPTII,S$GLB,, | Performed by: OBSTETRICS & GYNECOLOGY

## 2020-11-03 PROCEDURE — 99396 PREV VISIT EST AGE 40-64: CPT | Mod: S$GLB,,, | Performed by: OBSTETRICS & GYNECOLOGY

## 2020-11-03 PROCEDURE — 99396 PR PREVENTIVE VISIT,EST,40-64: ICD-10-PCS | Mod: S$GLB,,, | Performed by: OBSTETRICS & GYNECOLOGY

## 2020-11-03 PROCEDURE — 3080F DIAST BP >= 90 MM HG: CPT | Mod: CPTII,S$GLB,, | Performed by: OBSTETRICS & GYNECOLOGY

## 2020-11-03 PROCEDURE — 3008F PR BODY MASS INDEX (BMI) DOCUMENTED: ICD-10-PCS | Mod: CPTII,S$GLB,, | Performed by: OBSTETRICS & GYNECOLOGY

## 2020-11-03 PROCEDURE — 99999 PR PBB SHADOW E&M-EST. PATIENT-LVL IV: ICD-10-PCS | Mod: PBBFAC,,, | Performed by: OBSTETRICS & GYNECOLOGY

## 2020-11-03 PROCEDURE — 3080F PR MOST RECENT DIASTOLIC BLOOD PRESSURE >= 90 MM HG: ICD-10-PCS | Mod: CPTII,S$GLB,, | Performed by: OBSTETRICS & GYNECOLOGY

## 2020-11-03 RX ORDER — TETRACYCLINE HCL 500 MG
CAPSULE ORAL
COMMUNITY

## 2020-11-03 RX ORDER — TURMERIC 400 MG
CAPSULE ORAL
COMMUNITY
End: 2023-05-08

## 2020-11-03 RX ORDER — CALCIUM CARBONATE/VITAMIN D3 600 MG-10
TABLET ORAL
COMMUNITY
End: 2023-05-08

## 2020-11-03 NOTE — PROGRESS NOTES
HISTORY OF PRESENT ILLNESS:    Riley aMrcus is a 65 y.o. female, , No LMP recorded. Patient has had a hysterectomy.,  presents for a routine exam and has no complaints.  Elevated blood pressure, patient took am meds. Patient denies headaches, blurry vision, chest pain, shortness of breath or right upper quadrant pain.  Strict precautions given.     Past Medical History:   Diagnosis Date    Arthritis     Blood transfusion     Corneal abrasion 20 yrs    ? eye due to cls     Hypertension     Nuclear sclerosis of both eyes 2017       Past Surgical History:   Procedure Laterality Date    Breast reduction      EYE SURGERY      Lasik bilat eyes    HYSTERECTOMY      LASER LAPAROSCOPY      MINI-LAPAROTOMY         MEDICATIONS AND ALLERGIES:      Current Outpatient Medications:     apple cider vinegar 500 mg Tab, , Disp: , Rfl:     ascorbic acid (VITAMIN C) 500 MG tablet, Take 500 mg by mouth once daily.  , Disp: , Rfl:     aspirin 81 mg Tab, Take by mouth. 1 Tablet Oral Every day, Disp: , Rfl:     black cohosh 40 mg Tab, Take by mouth., Disp: , Rfl:     calcium-vitamin D3 (CALCIUM 500 + D) 500 mg(1,250mg) -200 unit per tablet, Take 1 tablet by mouth 2 (two) times daily with meals., Disp: , Rfl:     cyanocobalamin-cobamamide (B-12 PLUS) 5,000-100 mcg Subl, Place under the tongue. 1 Tablet, Sublingual Sublingual Every day, Disp: , Rfl:     GLUC MAURICE/CHONDRO MAURICE A/VIT C/MN (GLUCOSAMINE-CHONDROIT-VIT C-MN) 373-571-86-5 mg Tab, Take by mouth. 1 Tablet Oral Every day, Disp: , Rfl:     green tea leaf extract (GREEN TEA) 315 mg Cap, Take by mouth. 1 Capsule Oral Every day, Disp: , Rfl:     lisinopriL (PRINIVIL,ZESTRIL) 40 MG tablet, Take 1 tablet (40 mg total) by mouth once daily., Disp: 90 tablet, Rfl: 3    magnesium 30 mg Tab, Take by mouth once., Disp: , Rfl:     metoprolol succinate (TOPROL-XL) 25 MG 24 hr tablet, Take 1 tablet (25 mg total) by mouth once daily., Disp: 90 tablet, Rfl:  3    omega-3 fatty acids 1,000 mg Cap, Take by mouth. 1 Capsule Oral Every day, Disp: , Rfl:     plant stanol daija (CHOLEST OFF) 450 mg Tab, , Disp: , Rfl:     triamterene-hydrochlorothiazide 37.5-25 mg (DYAZIDE) 37.5-25 mg per capsule, Take 1 capsule by mouth once daily., Disp: 90 capsule, Rfl: 3    turmeric 400 mg Cap, , Disp: , Rfl:     vitamin D (VITAMIN D3) 1000 units Tab, Take 1,000 Units by mouth once daily., Disp: , Rfl:     VITAMIN E,DL-ALPHA TOCOPHEROL, (VITAMIN E, BULK, MISC), by Misc.(Non-Drug; Combo Route) route., Disp: , Rfl:     Review of patient's allergies indicates:   Allergen Reactions    Sulfa (sulfonamide antibiotics) Itching and Rash     Other reaction(s): Rash       Family History   Problem Relation Age of Onset    Arthritis Mother     Heart disease Mother     Hypertension Mother     Cataracts Mother     Heart disease Father     Hypertension Father     Stroke Father     Diabetes Sister     Hypertension Sister     Hypertension Sister     Eczema Other     Arthritis Maternal Grandmother     Heart disease Maternal Grandmother     Hypertension Maternal Grandmother     Arthritis Maternal Grandfather     Arthritis Paternal Grandmother     Heart disease Paternal Grandmother     Arthritis Paternal Grandfather     No Known Problems Brother     No Known Problems Maternal Aunt     No Known Problems Maternal Uncle     No Known Problems Paternal Aunt     No Known Problems Paternal Uncle     Ovarian cancer Neg Hx     Breast cancer Neg Hx     Anxiety disorder Neg Hx     Depression Neg Hx     Suicide Neg Hx     Amblyopia Neg Hx     Blindness Neg Hx     Cancer Neg Hx     Glaucoma Neg Hx     Macular degeneration Neg Hx     Retinal detachment Neg Hx     Strabismus Neg Hx     Thyroid disease Neg Hx        Social History     Socioeconomic History    Marital status:      Spouse name: Not on file    Number of children: 0    Years of education: Not on file     Highest education level: Not on file   Occupational History    Occupation: RN     Employer: [employer]   Social Needs    Financial resource strain: Not on file    Food insecurity     Worry: Not on file     Inability: Not on file    Transportation needs     Medical: Not on file     Non-medical: Not on file   Tobacco Use    Smoking status: Never Smoker    Smokeless tobacco: Never Used   Substance and Sexual Activity    Alcohol use: Yes     Comment: RARELY    Drug use: No    Sexual activity: Not Currently     Partners: Male   Lifestyle    Physical activity     Days per week: Not on file     Minutes per session: Not on file    Stress: Not on file   Relationships    Social connections     Talks on phone: Not on file     Gets together: Not on file     Attends Mandaeism service: Not on file     Active member of club or organization: Not on file     Attends meetings of clubs or organizations: Not on file     Relationship status: Not on file   Other Topics Concern    Are you pregnant or think you may be? Not Asked    Breast-feeding Not Asked    Patient feels they ought to cut down on drinking/drug use Not Asked    Patient annoyed by others criticizing their drinking/drug use Not Asked    Patient has felt bad or guilty about drinking/drug use Not Asked    Patient has had a drink/used drugs as an eye opener in the AM Not Asked   Social History Narrative    Not on file       COMPREHENSIVE GYN HISTORY:  PAP History: Denies abnormal Paps.  Infection History: Denies STDs. Denies PID.  Benign History: Denies uterine fibroids. Denies ovarian cysts. Denies endometriosis. Denies other conditions.  Cancer History: Denies cervical cancer. Denies uterine cancer or hyperplasia. Denies ovarian cancer. Denies vulvar cancer or pre-cancer. Denies vaginal cancer or pre-cancer. Denies breast cancer. Denies colon cancer.  Sexual Activity History: Reports currently being sexually active  Menstrual History:  Monthly. Mod then light flow.   Dysmenorrhea History: Reports mild dysmenorrhea.       ROS:  GENERAL: No weight changes. No swelling. No fatigue. No fever.  CARDIOVASCULAR: No chest pain. No shortness of breath. No leg cramps.   NEUROLOGICAL: No headaches. No vision changes.  BREASTS: No pain. No lumps. No discharge.  ABDOMEN: No pain. No nausea. No vomiting. No diarrhea. No constipation.  REPRODUCTIVE: No abnormal bleeding.   VULVA: No pain. No lesions. No itching.  VAGINA: No relaxation. No itching. No odor. No discharge. No lesions.  URINARY: No incontinence. No nocturia. No frequency. No dysuria.    BP (!) 178/96   Wt 81.2 kg (179 lb 0.2 oz)   BMI 32.74 kg/m²     PE:  APPEARANCE: Well nourished, well developed, in no acute distress.  AFFECT: WNL, alert and oriented x 3.  SKIN: No acne or hirsutism.  NECK: Neck symmetric, without masses or thyromegaly.  NODES: No inguinal, cervical, axillary or femoral lymph node enlargement.  CHEST: Good respiratory effort.   ABDOMEN: Soft. No tenderness or masses. No hepatosplenomegaly. No hernias.  BREASTS: Symmetrical, no skin changes, visible lesions, palpable masses or nipple discharge bilaterally. Left breast - mobile cystic area in the breast 12 o'clock near areolar area,   PELVIC: External female genitalia without lesions.  Female hair distribution. Adequate perineal body, Normal urethral meatus. Vagina moist and well rugated without lesions or discharge.  No significant cystocele or rectocele present. Uterus absent. Adnexa without masses or tenderness.  EXTREMITIES: No edema    DIAGNOSIS:  1. Visit for screening mammogram    2. Encounter for gynecological examination without abnormal finding    3. Essential hypertension    4. Fibrocystic breast changes, unspecified laterality    5. Cyst of breast, unspecified laterality      COUNSELING:  The patient was counseled today on:  -A.C.S. Pap and pelvic exam guidelines (pap every 3 years), recomendations for yearly  mammogram;  -to follow up with her PCP for other health maintenance.    FOLLOW-UP with me annually.

## 2020-11-04 ENCOUNTER — HOSPITAL ENCOUNTER (OUTPATIENT)
Dept: RADIOLOGY | Facility: HOSPITAL | Age: 65
Discharge: HOME | End: 2020-11-04
Attending: OBSTETRICS & GYNECOLOGY
Payer: COMMERCIAL

## 2020-11-04 VITALS — BODY MASS INDEX: 32.94 KG/M2 | HEIGHT: 62 IN | WEIGHT: 179 LBS

## 2020-11-04 DIAGNOSIS — N60.09 CYST OF BREAST, UNSPECIFIED LATERALITY: ICD-10-CM

## 2020-11-04 DIAGNOSIS — N60.19 FIBROCYSTIC BREAST CHANGES, UNSPECIFIED LATERALITY: ICD-10-CM

## 2020-11-04 DIAGNOSIS — Z12.31 VISIT FOR SCREENING MAMMOGRAM: ICD-10-CM

## 2020-11-04 PROCEDURE — G0279 TOMOSYNTHESIS, MAMMO: HCPCS | Mod: 26,,, | Performed by: RADIOLOGY

## 2020-11-04 PROCEDURE — 77066 DX MAMMO INCL CAD BI: CPT | Mod: 26,,, | Performed by: RADIOLOGY

## 2020-11-04 PROCEDURE — 76642 US BREAST LEFT LIMITED: ICD-10-PCS | Mod: 26,LT,, | Performed by: RADIOLOGY

## 2020-11-04 PROCEDURE — 76642 ULTRASOUND BREAST LIMITED: CPT | Mod: TC,LT

## 2020-11-04 PROCEDURE — 76642 ULTRASOUND BREAST LIMITED: CPT | Mod: 26,LT,, | Performed by: RADIOLOGY

## 2020-11-04 PROCEDURE — 77062 BREAST TOMOSYNTHESIS BI: CPT | Mod: TC

## 2020-11-04 PROCEDURE — G0279 MAMMO DIGITAL DIAGNOSTIC BILAT WITH TOMO: ICD-10-PCS | Mod: 26,,, | Performed by: RADIOLOGY

## 2020-11-04 PROCEDURE — 77066 MAMMO DIGITAL DIAGNOSTIC BILAT WITH TOMO: ICD-10-PCS | Mod: 26,,, | Performed by: RADIOLOGY

## 2020-11-20 ENCOUNTER — OFFICE VISIT (OUTPATIENT)
Dept: OPTOMETRY | Facility: CLINIC | Age: 65
End: 2020-11-20
Payer: COMMERCIAL

## 2020-11-20 DIAGNOSIS — H25.13 NUCLEAR SCLEROSIS OF BOTH EYES: ICD-10-CM

## 2020-11-20 DIAGNOSIS — H35.412 LATTICE DEGENERATION OF LEFT RETINA: ICD-10-CM

## 2020-11-20 DIAGNOSIS — H52.7 REFRACTIVE ERROR: ICD-10-CM

## 2020-11-20 DIAGNOSIS — H34.8322 STABLE BRANCH RETINAL VEIN OCCLUSION OF LEFT EYE: Primary | ICD-10-CM

## 2020-11-20 DIAGNOSIS — Z98.890 HX OF LASIK: ICD-10-CM

## 2020-11-20 PROCEDURE — 99999 PR PBB SHADOW E&M-EST. PATIENT-LVL III: CPT | Mod: PBBFAC,,, | Performed by: OPTOMETRIST

## 2020-11-20 PROCEDURE — 1126F AMNT PAIN NOTED NONE PRSNT: CPT | Mod: S$GLB,,, | Performed by: OPTOMETRIST

## 2020-11-20 PROCEDURE — 92014 PR EYE EXAM, EST PATIENT,COMPREHESV: ICD-10-PCS | Mod: S$GLB,,, | Performed by: OPTOMETRIST

## 2020-11-20 PROCEDURE — 92014 COMPRE OPH EXAM EST PT 1/>: CPT | Mod: S$GLB,,, | Performed by: OPTOMETRIST

## 2020-11-20 PROCEDURE — 92015 DETERMINE REFRACTIVE STATE: CPT | Mod: S$GLB,,, | Performed by: OPTOMETRIST

## 2020-11-20 PROCEDURE — 99999 PR PBB SHADOW E&M-EST. PATIENT-LVL III: ICD-10-PCS | Mod: PBBFAC,,, | Performed by: OPTOMETRIST

## 2020-11-20 PROCEDURE — 92015 PR REFRACTION: ICD-10-PCS | Mod: S$GLB,,, | Performed by: OPTOMETRIST

## 2020-11-20 PROCEDURE — 1126F PR PAIN SEVERITY QUANTIFIED, NO PAIN PRESENT: ICD-10-PCS | Mod: S$GLB,,, | Performed by: OPTOMETRIST

## 2020-11-20 NOTE — PROGRESS NOTES
Subjective:       Patient ID: Riley Marcus is a 65 y.o. female      Chief Complaint   Patient presents with    Concerns About Ocular Health     History of Present Illness  Dls: 4/22/19 Dr. Olson     64 y/o female presents today for hypertensive eye exam.   Pt c/o blurry vision at near ou.   Pt wears otc readers.     No tearing  No itching  No burning  No pain  + ha's  No floaters  No flashes    Eye meds  Tears ou prn           Assessment/Plan:     1. Stable branch retinal vein occlusion of left eye  ME resolved.  Last injection May 2018  CV risk factor control  Observe    2. Lattice degeneration of left retina  Stable. No breaks. RD precautions.    3. Nuclear sclerosis of both eyes  Educated pt on presence of cataracts and effects on vision. No surgery at this time. Recheck in one year.    4. Hx of LASIK  Stable, no signs of ectasia.    5. Refractive error  Optional Rx. Pt needs stronger readers for near.     Eyeglass Final Rx     Eyeglass Final Rx       Sphere Cylinder Axis Add    Right +0.50 +0.25 025 +2.50    Left +0.50 Sphere  +2.50    Expiration Date: 11/21/2021                  Follow up in about 1 year (around 11/20/2021).

## 2020-12-11 ENCOUNTER — PATIENT MESSAGE (OUTPATIENT)
Dept: OTHER | Facility: OTHER | Age: 65
End: 2020-12-11

## 2020-12-15 ENCOUNTER — IMMUNIZATION (OUTPATIENT)
Dept: OBSTETRICS AND GYNECOLOGY | Facility: CLINIC | Age: 65
End: 2020-12-15
Payer: COMMERCIAL

## 2020-12-15 DIAGNOSIS — Z23 NEED FOR VACCINATION: ICD-10-CM

## 2020-12-15 PROCEDURE — 0001A COVID-19, MRNA, LNP-S, PF, 30 MCG/0.3 ML DOSE VACCINE: CPT | Mod: CV19,,, | Performed by: FAMILY MEDICINE

## 2020-12-15 PROCEDURE — 0001A COVID-19, MRNA, LNP-S, PF, 30 MCG/0.3 ML DOSE VACCINE: ICD-10-PCS | Mod: CV19,,, | Performed by: FAMILY MEDICINE

## 2020-12-15 PROCEDURE — 91300 COVID-19, MRNA, LNP-S, PF, 30 MCG/0.3 ML DOSE VACCINE: ICD-10-PCS | Mod: ,,, | Performed by: FAMILY MEDICINE

## 2020-12-15 PROCEDURE — 91300 COVID-19, MRNA, LNP-S, PF, 30 MCG/0.3 ML DOSE VACCINE: CPT | Mod: ,,, | Performed by: FAMILY MEDICINE

## 2021-01-05 ENCOUNTER — IMMUNIZATION (OUTPATIENT)
Dept: OBSTETRICS AND GYNECOLOGY | Facility: CLINIC | Age: 66
End: 2021-01-05
Payer: COMMERCIAL

## 2021-01-05 DIAGNOSIS — Z23 NEED FOR VACCINATION: ICD-10-CM

## 2021-01-05 PROCEDURE — 0002A COVID-19, MRNA, LNP-S, PF, 30 MCG/0.3 ML DOSE VACCINE: CPT | Mod: PBBFAC | Performed by: FAMILY MEDICINE

## 2021-01-05 PROCEDURE — 91300 COVID-19, MRNA, LNP-S, PF, 30 MCG/0.3 ML DOSE VACCINE: CPT | Mod: PBBFAC | Performed by: FAMILY MEDICINE

## 2021-01-06 ENCOUNTER — OFFICE VISIT (OUTPATIENT)
Dept: OBSTETRICS AND GYNECOLOGY | Facility: CLINIC | Age: 66
End: 2021-01-06
Payer: COMMERCIAL

## 2021-01-06 VITALS
HEIGHT: 62 IN | DIASTOLIC BLOOD PRESSURE: 88 MMHG | WEIGHT: 178.56 LBS | BODY MASS INDEX: 32.86 KG/M2 | SYSTOLIC BLOOD PRESSURE: 154 MMHG

## 2021-01-06 DIAGNOSIS — R10.9 ABDOMINAL PAIN, UNSPECIFIED ABDOMINAL LOCATION: ICD-10-CM

## 2021-01-06 DIAGNOSIS — M79.18 MYOFASCIAL PAIN: Primary | ICD-10-CM

## 2021-01-06 PROCEDURE — 3008F PR BODY MASS INDEX (BMI) DOCUMENTED: ICD-10-PCS | Mod: CPTII,S$GLB,, | Performed by: OBSTETRICS & GYNECOLOGY

## 2021-01-06 PROCEDURE — 99999 PR PBB SHADOW E&M-EST. PATIENT-LVL III: ICD-10-PCS | Mod: PBBFAC,,, | Performed by: OBSTETRICS & GYNECOLOGY

## 2021-01-06 PROCEDURE — 1125F PR PAIN SEVERITY QUANTIFIED, PAIN PRESENT: ICD-10-PCS | Mod: S$GLB,,, | Performed by: OBSTETRICS & GYNECOLOGY

## 2021-01-06 PROCEDURE — 3077F SYST BP >= 140 MM HG: CPT | Mod: CPTII,S$GLB,, | Performed by: OBSTETRICS & GYNECOLOGY

## 2021-01-06 PROCEDURE — 3079F DIAST BP 80-89 MM HG: CPT | Mod: CPTII,S$GLB,, | Performed by: OBSTETRICS & GYNECOLOGY

## 2021-01-06 PROCEDURE — 99213 OFFICE O/P EST LOW 20 MIN: CPT | Mod: S$GLB,,, | Performed by: OBSTETRICS & GYNECOLOGY

## 2021-01-06 PROCEDURE — 99999 PR PBB SHADOW E&M-EST. PATIENT-LVL III: CPT | Mod: PBBFAC,,, | Performed by: OBSTETRICS & GYNECOLOGY

## 2021-01-06 PROCEDURE — 3077F PR MOST RECENT SYSTOLIC BLOOD PRESSURE >= 140 MM HG: ICD-10-PCS | Mod: CPTII,S$GLB,, | Performed by: OBSTETRICS & GYNECOLOGY

## 2021-01-06 PROCEDURE — 1125F AMNT PAIN NOTED PAIN PRSNT: CPT | Mod: S$GLB,,, | Performed by: OBSTETRICS & GYNECOLOGY

## 2021-01-06 PROCEDURE — 99213 PR OFFICE/OUTPT VISIT, EST, LEVL III, 20-29 MIN: ICD-10-PCS | Mod: S$GLB,,, | Performed by: OBSTETRICS & GYNECOLOGY

## 2021-01-06 PROCEDURE — 3008F BODY MASS INDEX DOCD: CPT | Mod: CPTII,S$GLB,, | Performed by: OBSTETRICS & GYNECOLOGY

## 2021-01-06 PROCEDURE — 3079F PR MOST RECENT DIASTOLIC BLOOD PRESSURE 80-89 MM HG: ICD-10-PCS | Mod: CPTII,S$GLB,, | Performed by: OBSTETRICS & GYNECOLOGY

## 2021-01-06 RX ORDER — IBUPROFEN 600 MG/1
600 TABLET ORAL EVERY 6 HOURS PRN
Qty: 60 TABLET | Refills: 1 | Status: SHIPPED | OUTPATIENT
Start: 2021-01-06 | End: 2022-01-06

## 2021-01-06 RX ORDER — NAPROXEN 500 MG/1
500 TABLET ORAL EVERY 12 HOURS
COMMUNITY
Start: 2020-11-15 | End: 2022-05-06

## 2021-09-14 RX ORDER — TRIAMTERENE AND HYDROCHLOROTHIAZIDE 37.5; 25 MG/1; MG/1
1 CAPSULE ORAL DAILY
Qty: 90 CAPSULE | Refills: 3 | Status: SHIPPED | OUTPATIENT
Start: 2021-09-14 | End: 2022-10-11

## 2021-09-14 RX ORDER — LISINOPRIL 40 MG/1
40 TABLET ORAL DAILY
Qty: 90 TABLET | Refills: 3 | Status: SHIPPED | OUTPATIENT
Start: 2021-09-14 | End: 2022-10-11 | Stop reason: SDUPTHER

## 2021-09-14 RX ORDER — METOPROLOL SUCCINATE 25 MG/1
25 TABLET, EXTENDED RELEASE ORAL DAILY
Qty: 90 TABLET | Refills: 3 | Status: SHIPPED | OUTPATIENT
Start: 2021-09-14 | End: 2022-10-11 | Stop reason: SDUPTHER

## 2021-09-28 ENCOUNTER — IMMUNIZATION (OUTPATIENT)
Dept: OBSTETRICS AND GYNECOLOGY | Facility: CLINIC | Age: 66
End: 2021-09-28
Payer: COMMERCIAL

## 2021-09-28 DIAGNOSIS — Z23 NEED FOR VACCINATION: Primary | ICD-10-CM

## 2021-09-28 PROCEDURE — 91300 COVID-19, MRNA, LNP-S, PF, 30 MCG/0.3 ML DOSE VACCINE: CPT | Mod: PBBFAC | Performed by: FAMILY MEDICINE

## 2021-09-28 PROCEDURE — 0003A COVID-19, MRNA, LNP-S, PF, 30 MCG/0.3 ML DOSE VACCINE: CPT | Mod: PBBFAC | Performed by: FAMILY MEDICINE

## 2021-12-14 ENCOUNTER — OFFICE VISIT (OUTPATIENT)
Dept: FAMILY MEDICINE | Facility: CLINIC | Age: 66
End: 2021-12-14
Payer: COMMERCIAL

## 2021-12-14 VITALS
HEART RATE: 66 BPM | SYSTOLIC BLOOD PRESSURE: 120 MMHG | WEIGHT: 176.13 LBS | HEIGHT: 62 IN | DIASTOLIC BLOOD PRESSURE: 78 MMHG | BODY MASS INDEX: 32.41 KG/M2 | TEMPERATURE: 98 F | OXYGEN SATURATION: 97 %

## 2021-12-14 DIAGNOSIS — Z78.0 ASYMPTOMATIC MENOPAUSE: ICD-10-CM

## 2021-12-14 DIAGNOSIS — Z12.11 ENCOUNTER FOR SCREENING COLONOSCOPY: ICD-10-CM

## 2021-12-14 DIAGNOSIS — M10.00 IDIOPATHIC GOUT, UNSPECIFIED CHRONICITY, UNSPECIFIED SITE: ICD-10-CM

## 2021-12-14 DIAGNOSIS — I10 PRIMARY HYPERTENSION: ICD-10-CM

## 2021-12-14 DIAGNOSIS — Z00.00 ANNUAL PHYSICAL EXAM: Primary | ICD-10-CM

## 2021-12-14 DIAGNOSIS — Z12.31 ENCOUNTER FOR SCREENING MAMMOGRAM FOR BREAST CANCER: ICD-10-CM

## 2021-12-14 DIAGNOSIS — K21.9 GASTROESOPHAGEAL REFLUX DISEASE, UNSPECIFIED WHETHER ESOPHAGITIS PRESENT: ICD-10-CM

## 2021-12-14 DIAGNOSIS — Z11.4 ENCOUNTER FOR SCREENING FOR HIV: ICD-10-CM

## 2021-12-14 DIAGNOSIS — Z23 NEED FOR PNEUMOCOCCAL VACCINATION: ICD-10-CM

## 2021-12-14 PROCEDURE — 4010F PR ACE/ARB THEARPY RXD/TAKEN: ICD-10-PCS | Mod: CPTII,S$GLB,, | Performed by: INTERNAL MEDICINE

## 2021-12-14 PROCEDURE — 99387 INIT PM E/M NEW PAT 65+ YRS: CPT | Mod: 25,S$GLB,, | Performed by: INTERNAL MEDICINE

## 2021-12-14 PROCEDURE — 90732 PPSV23 VACC 2 YRS+ SUBQ/IM: CPT | Mod: S$GLB,,, | Performed by: INTERNAL MEDICINE

## 2021-12-14 PROCEDURE — 90732 PNEUMOCOCCAL POLYSACCHARIDE VACCINE 23-VALENT =>2YO SQ IM: ICD-10-PCS | Mod: S$GLB,,, | Performed by: INTERNAL MEDICINE

## 2021-12-14 PROCEDURE — 90471 IMMUNIZATION ADMIN: CPT | Mod: S$GLB,,, | Performed by: INTERNAL MEDICINE

## 2021-12-14 PROCEDURE — 99999 PR PBB SHADOW E&M-EST. PATIENT-LVL V: ICD-10-PCS | Mod: PBBFAC,,, | Performed by: INTERNAL MEDICINE

## 2021-12-14 PROCEDURE — 4010F ACE/ARB THERAPY RXD/TAKEN: CPT | Mod: CPTII,S$GLB,, | Performed by: INTERNAL MEDICINE

## 2021-12-14 PROCEDURE — 99387 PR PREVENTIVE VISIT,NEW,65 & OVER: ICD-10-PCS | Mod: 25,S$GLB,, | Performed by: INTERNAL MEDICINE

## 2021-12-14 PROCEDURE — 99999 PR PBB SHADOW E&M-EST. PATIENT-LVL V: CPT | Mod: PBBFAC,,, | Performed by: INTERNAL MEDICINE

## 2021-12-14 PROCEDURE — 90471 PNEUMOCOCCAL POLYSACCHARIDE VACCINE 23-VALENT =>2YO SQ IM: ICD-10-PCS | Mod: S$GLB,,, | Performed by: INTERNAL MEDICINE

## 2021-12-15 DIAGNOSIS — E83.52 HYPERCALCEMIA: ICD-10-CM

## 2021-12-15 DIAGNOSIS — M1A.00X0 IDIOPATHIC CHRONIC GOUT WITHOUT TOPHUS, UNSPECIFIED SITE: ICD-10-CM

## 2021-12-15 DIAGNOSIS — E78.00 PURE HYPERCHOLESTEROLEMIA: Primary | ICD-10-CM

## 2021-12-15 PROBLEM — R73.03 PREDIABETES: Status: ACTIVE | Noted: 2021-12-15

## 2021-12-15 PROBLEM — N18.31 STAGE 3A CHRONIC KIDNEY DISEASE: Status: ACTIVE | Noted: 2021-12-15

## 2021-12-15 RX ORDER — ATORVASTATIN CALCIUM 40 MG/1
40 TABLET, FILM COATED ORAL DAILY
Qty: 90 TABLET | Refills: 3 | Status: SHIPPED | OUTPATIENT
Start: 2021-12-15 | End: 2022-10-11 | Stop reason: SINTOL

## 2022-01-04 DIAGNOSIS — R11.0 NAUSEA: Primary | ICD-10-CM

## 2022-01-04 PROCEDURE — 87811 SARS-COV-2 COVID19 W/OPTIC: CPT | Performed by: PREVENTIVE MEDICINE

## 2022-01-05 LAB
CTP QC/QA: YES
SARS-COV-2 AG RESP QL IA.RAPID: NEGATIVE

## 2022-01-25 ENCOUNTER — OFFICE VISIT (OUTPATIENT)
Dept: GASTROENTEROLOGY | Facility: CLINIC | Age: 67
End: 2022-01-25
Payer: COMMERCIAL

## 2022-01-25 VITALS
HEIGHT: 62 IN | HEART RATE: 78 BPM | OXYGEN SATURATION: 98 % | BODY MASS INDEX: 32.22 KG/M2 | DIASTOLIC BLOOD PRESSURE: 88 MMHG | SYSTOLIC BLOOD PRESSURE: 142 MMHG | WEIGHT: 175.06 LBS

## 2022-01-25 DIAGNOSIS — Z12.11 COLON CANCER SCREENING: ICD-10-CM

## 2022-01-25 DIAGNOSIS — K21.9 GASTROESOPHAGEAL REFLUX DISEASE, UNSPECIFIED WHETHER ESOPHAGITIS PRESENT: Primary | ICD-10-CM

## 2022-01-25 PROCEDURE — 1126F AMNT PAIN NOTED NONE PRSNT: CPT | Mod: CPTII,S$GLB,, | Performed by: INTERNAL MEDICINE

## 2022-01-25 PROCEDURE — 1126F PR PAIN SEVERITY QUANTIFIED, NO PAIN PRESENT: ICD-10-PCS | Mod: CPTII,S$GLB,, | Performed by: INTERNAL MEDICINE

## 2022-01-25 PROCEDURE — 3008F BODY MASS INDEX DOCD: CPT | Mod: CPTII,S$GLB,, | Performed by: INTERNAL MEDICINE

## 2022-01-25 PROCEDURE — 1159F MED LIST DOCD IN RCRD: CPT | Mod: CPTII,S$GLB,, | Performed by: INTERNAL MEDICINE

## 2022-01-25 PROCEDURE — 1159F PR MEDICATION LIST DOCUMENTED IN MEDICAL RECORD: ICD-10-PCS | Mod: CPTII,S$GLB,, | Performed by: INTERNAL MEDICINE

## 2022-01-25 PROCEDURE — 3008F PR BODY MASS INDEX (BMI) DOCUMENTED: ICD-10-PCS | Mod: CPTII,S$GLB,, | Performed by: INTERNAL MEDICINE

## 2022-01-25 PROCEDURE — 99204 PR OFFICE/OUTPT VISIT, NEW, LEVL IV, 45-59 MIN: ICD-10-PCS | Mod: S$GLB,,, | Performed by: INTERNAL MEDICINE

## 2022-01-25 PROCEDURE — 99204 OFFICE O/P NEW MOD 45 MIN: CPT | Mod: S$GLB,,, | Performed by: INTERNAL MEDICINE

## 2022-01-25 PROCEDURE — 3288F FALL RISK ASSESSMENT DOCD: CPT | Mod: CPTII,S$GLB,, | Performed by: INTERNAL MEDICINE

## 2022-01-25 PROCEDURE — 99999 PR PBB SHADOW E&M-EST. PATIENT-LVL V: CPT | Mod: PBBFAC,,, | Performed by: INTERNAL MEDICINE

## 2022-01-25 PROCEDURE — 3077F PR MOST RECENT SYSTOLIC BLOOD PRESSURE >= 140 MM HG: ICD-10-PCS | Mod: CPTII,S$GLB,, | Performed by: INTERNAL MEDICINE

## 2022-01-25 PROCEDURE — 3079F DIAST BP 80-89 MM HG: CPT | Mod: CPTII,S$GLB,, | Performed by: INTERNAL MEDICINE

## 2022-01-25 PROCEDURE — 3288F PR FALLS RISK ASSESSMENT DOCUMENTED: ICD-10-PCS | Mod: CPTII,S$GLB,, | Performed by: INTERNAL MEDICINE

## 2022-01-25 PROCEDURE — 1101F PT FALLS ASSESS-DOCD LE1/YR: CPT | Mod: CPTII,S$GLB,, | Performed by: INTERNAL MEDICINE

## 2022-01-25 PROCEDURE — 3077F SYST BP >= 140 MM HG: CPT | Mod: CPTII,S$GLB,, | Performed by: INTERNAL MEDICINE

## 2022-01-25 PROCEDURE — 1101F PR PT FALLS ASSESS DOC 0-1 FALLS W/OUT INJ PAST YR: ICD-10-PCS | Mod: CPTII,S$GLB,, | Performed by: INTERNAL MEDICINE

## 2022-01-25 PROCEDURE — 3079F PR MOST RECENT DIASTOLIC BLOOD PRESSURE 80-89 MM HG: ICD-10-PCS | Mod: CPTII,S$GLB,, | Performed by: INTERNAL MEDICINE

## 2022-01-25 PROCEDURE — 99999 PR PBB SHADOW E&M-EST. PATIENT-LVL V: ICD-10-PCS | Mod: PBBFAC,,, | Performed by: INTERNAL MEDICINE

## 2022-01-25 RX ORDER — PANTOPRAZOLE SODIUM 40 MG/1
40 TABLET, DELAYED RELEASE ORAL DAILY
Qty: 90 TABLET | Refills: 5 | Status: SHIPPED | OUTPATIENT
Start: 2022-01-25 | End: 2023-02-07 | Stop reason: SDUPTHER

## 2022-01-25 RX ORDER — DOCUSATE SODIUM 100 MG/1
100 CAPSULE, LIQUID FILLED ORAL DAILY
COMMUNITY

## 2022-01-25 NOTE — PROGRESS NOTES
[facility] [place] [facility] [place] [place] Consult:  The primary encounter diagnosis was Gastroesophageal reflux disease, unspecified whether esophagitis present. A diagnosis of Colon cancer screening was also pertinent to this visit.    PCP:   Olga Duncan   [address] [place] [id number]    Referring MD:  Olga Duncan Md  [address]  [address]  [place] [address]    HPI:  This is a 66 y.o. female with HTN, HLD, CKD3, preDM, gout who presents to GI clinic.     She describes heartburn, indigestion, and hoarse for the past 6 months. She takes prilosec for 2 weeks at a time. She buys it OTC.   Perez makes her symptoms worse as well as greasy foods.   Denies dysphagia.   She had an EGD 10-11 years ago - was told it was normal.     She also had a colonoscopy 10-11 years ago - was told it was normal.   Denies any rectal bleeding. She describes constipation that alternates with normal stools.   She is trying to drink at least 2, 16 oz of water per day.     Denies family history of GI diseases or malignancy.  Denies unintentional weight loss.  Denies tobacco abuse, occasional etoh use.  Denies previous GI surgeries.    ROS:  Constitutional: No fevers, chills, No weight loss  ENT: No allergies  CV: No chest pain  Pulm: No cough, No shortness of breath  Ophtho: No vision changes  GI: see HPI  Derm: No rash  Heme: No lymphadenopathy, No bruising  MSK: No arthritis  : No dysuria, No hematuria  Endo: No hot or cold intolerance  Neuro: No syncope, No seizure  Psych: No anxiety, No depression    Medical History:  has a past medical history of Arthritis, Blood transfusion, Corneal abrasion (20 yrs), Hypertension, and Nuclear sclerosis of both eyes (12/8/2017).    Surgical History:  has a past surgical history that includes Hysterectomy; Breast reduction; Mini-laparotomy (1988); Laser laparoscopy (1988); Eye surgery; Total Reduction Mammoplasty; and Oophorectomy.    Family  History: family history includes Arthritis in her maternal grandfather, maternal grandmother, mother, paternal grandfather, and paternal grandmother; Cataracts in her mother; Diabetes in her sister; Eczema in her other; Heart disease in her father, maternal grandmother, mother, and paternal grandmother; Hypertension in her father, maternal grandmother, mother, sister, and sister; No Known Problems in her brother, maternal aunt, maternal uncle, paternal aunt, and paternal uncle; Stroke in her father..   No contributory family history     Social History:  reports that she has never smoked. She has never used smokeless tobacco. She reports current alcohol use. She reports that she does not use drugs.    Review of patient's allergies indicates:   Allergen Reactions    Sulfa (sulfonamide antibiotics) Itching and Rash     Other reaction(s): Rash       Current Outpatient Rx   Medication Sig Dispense Refill    apple cider vinegar 500 mg Tab       ascorbic acid, vitamin C, (VITAMIN C) 500 MG tablet Take 500 mg by mouth once daily.      aspirin 81 mg Tab Take by mouth. 1 Tablet Oral Every day      atorvastatin (LIPITOR) 40 MG tablet Take 1 tablet (40 mg total) by mouth once daily. 90 tablet 3    black cohosh 40 mg Tab Take by mouth.      calcium-vitamin D3 (OS-JOANNE 500 + D3) 500 mg-5 mcg (200 unit) per tablet Take 1 tablet by mouth 2 (two) times daily with meals.      docusate sodium (COLACE) 100 MG capsule Take 100 mg by mouth once daily.      GLUC MAURICE/CHONDRO MAURICE A/VIT C/MN (GLUCOSAMINE-CHONDROIT-VIT C-MN) 153-363-90-5 mg Tab Take by mouth. 1 Tablet Oral Every day      green tea leaf extract 315 mg Cap Take by mouth. 1 Capsule Oral Every day      lisinopriL (PRINIVIL,ZESTRIL) 40 MG tablet Take 1 tablet (40 mg total) by mouth once daily. 90 tablet 3    magnesium 30 mg Tab Take by mouth once.      metoprolol succinate (TOPROL-XL) 25 MG 24 hr tablet Take 1 tablet (25 mg total) by mouth once daily. 90 tablet 3     "omega-3 fatty acids 1,000 mg Cap Take by mouth. 1 Capsule Oral Every day      plant stanol daija (CHOLEST OFF) 450 mg Tab       triamterene-hydrochlorothiazide 37.5-25 mg (DYAZIDE) 37.5-25 mg per capsule Take 1 capsule by mouth once daily. 90 capsule 3    turmeric 400 mg Cap       vitamin D (VITAMIN D3) 1000 units Tab Take 1,000 Units by mouth once daily.      VITAMIN E,DL-ALPHA TOCOPHEROL, (VITAMIN E, BULK, MISC) by Misc.(Non-Drug; Combo Route) route.      cyanocobalamin-cobamamide 5,000-100 mcg Subl Place under the tongue. 1 Tablet, Sublingual Sublingual Every day      naproxen (NAPROSYN) 500 MG tablet Take 500 mg by mouth every 12 (twelve) hours.      omeprazole magnesium (PRILOSEC OTC ORAL) Take by mouth as needed.      pantoprazole (PROTONIX) 40 MG tablet Take 1 tablet (40 mg total) by mouth once daily. Take 30 minutes before breakfast 90 tablet 5       Objective Findings:    Vital Signs:  BP (!) 142/88   Pulse 78   Ht 5' 2" (1.575 m)   Wt 79.4 kg (175 lb 0.7 oz)   SpO2 98%   BMI 32.02 kg/m²   Body mass index is 32.02 kg/m².    Physical Exam:  General Appearance: well-appearing, NAD  Head:   Normocephalic, without obvious abnormality  Eyes:    No scleral icterus, EOMI  ENT: Neck supple; moist mucus membranes  Lungs: clear to auscultation bilaterally.  Heart:  regular rate and rhythm, S1, S2 normal, no murmurs heard  Abdomen: soft, non-tender, non-distended with bowel sounds in all four quadrants. No hepatosplenomegaly, ascites, or palpable masses  Extremities: 2+ pulses, no clubbing, cyanosis or edema  Skin: No visible rash  Neurologic: no focal motor deficits      Labs:  Lab Results   Component Value Date    WBC 4.69 12/14/2021    HGB 13.4 12/14/2021    HCT 43.1 12/14/2021     12/14/2021    CHOL 222 (H) 12/14/2021    TRIG 102 12/14/2021    HDL 54 12/14/2021    ALT 23 12/14/2021    AST 21 12/14/2021     12/14/2021    K 4.7 12/14/2021     12/14/2021    CREATININE 1.4 12/14/2021 "    BUN 25 (H) 12/14/2021    CO2 29 12/14/2021    TSH 1.780 12/14/2021    INR 1.0 04/12/2017    HGBA1C 6.0 (H) 12/14/2021       IMAGING:  No recent abdominal imaging    PROCEDURES:  Colonoscopy 2009 - unable to see report    Case request placed for colonoscopy 2021    Assessment:  This is a 66 y.o. female with HTN, HLD, CKD3, preDM, gout who presents to GI clinic.       Recommendations:  GERD  GERD precautions discussed in detail  Start Protonix 40 mg once daily 30 minutes before breakfast for 6-8 weeks    COLORECTAL CANCER SCREENING  Case request placed for colonoscopy    Follow up in about 2 months (around 3/25/2022).      Order summary:  Orders Placed This Encounter    pantoprazole (PROTONIX) 40 MG tablet    Case Request Endoscopy: COLONOSCOPY         Thank you so much for allowing me to participate in the care of Riley Regalado MD  Gastroenterology   [facility]

## 2022-02-14 ENCOUNTER — HOSPITAL ENCOUNTER (OUTPATIENT)
Dept: RADIOLOGY | Facility: CLINIC | Age: 67
Discharge: HOME | End: 2022-02-14
Attending: INTERNAL MEDICINE
Payer: COMMERCIAL

## 2022-02-14 ENCOUNTER — HOSPITAL ENCOUNTER (OUTPATIENT)
Dept: RADIOLOGY | Facility: HOSPITAL | Age: 67
Discharge: HOME | End: 2022-02-14
Attending: INTERNAL MEDICINE
Payer: COMMERCIAL

## 2022-02-14 VITALS — WEIGHT: 175 LBS | BODY MASS INDEX: 32.2 KG/M2 | HEIGHT: 62 IN

## 2022-02-14 DIAGNOSIS — Z78.0 ASYMPTOMATIC MENOPAUSE: ICD-10-CM

## 2022-02-14 DIAGNOSIS — Z12.31 ENCOUNTER FOR SCREENING MAMMOGRAM FOR BREAST CANCER: ICD-10-CM

## 2022-02-14 PROCEDURE — 77063 MAMMO DIGITAL SCREENING BILAT WITH TOMO: ICD-10-PCS | Mod: 26,,, | Performed by: RADIOLOGY

## 2022-02-14 PROCEDURE — 77067 MAMMO DIGITAL SCREENING BILAT WITH TOMO: ICD-10-PCS | Mod: 26,,, | Performed by: RADIOLOGY

## 2022-02-14 PROCEDURE — 77067 SCR MAMMO BI INCL CAD: CPT | Mod: 26,,, | Performed by: RADIOLOGY

## 2022-02-14 PROCEDURE — 77063 BREAST TOMOSYNTHESIS BI: CPT | Mod: TC,PO

## 2022-02-14 PROCEDURE — 77080 DXA BONE DENSITY AXIAL: CPT | Mod: TC,PO

## 2022-02-14 PROCEDURE — 77080 DXA BONE DENSITY AXIAL: CPT | Mod: 26,,, | Performed by: INTERNAL MEDICINE

## 2022-02-14 PROCEDURE — 77063 BREAST TOMOSYNTHESIS BI: CPT | Mod: 26,,, | Performed by: RADIOLOGY

## 2022-02-14 PROCEDURE — 77067 SCR MAMMO BI INCL CAD: CPT | Mod: TC,PO

## 2022-02-14 PROCEDURE — 77080 DEXA BONE DENSITY SPINE HIP: ICD-10-PCS | Mod: 26,,, | Performed by: INTERNAL MEDICINE

## 2022-04-20 ENCOUNTER — PATIENT MESSAGE (OUTPATIENT)
Dept: FAMILY MEDICINE | Facility: CLINIC | Age: 67
End: 2022-04-20
Payer: COMMERCIAL

## 2022-04-20 ENCOUNTER — TELEPHONE (OUTPATIENT)
Dept: FAMILY MEDICINE | Facility: CLINIC | Age: 67
End: 2022-04-20
Payer: COMMERCIAL

## 2022-04-20 DIAGNOSIS — M1A.00X0 IDIOPATHIC CHRONIC GOUT WITHOUT TOPHUS, UNSPECIFIED SITE: Primary | ICD-10-CM

## 2022-04-20 RX ORDER — METHYLPREDNISOLONE 4 MG/1
TABLET ORAL
Qty: 21 EACH | Refills: 0 | Status: SHIPPED | OUTPATIENT
Start: 2022-04-21 | End: 2022-05-06

## 2022-04-20 NOTE — TELEPHONE ENCOUNTER
----- Message from Lilia Avelar sent at 4/20/2022  3:22 PM CDT -----  Type: Patient Call Back    Who called: self     What is the request in detail: patient is have a flare up from gout would like to have a script to treat it. Please call    Can the clinic reply by [facility]? no    Would the patient rather a call back or a response via [facility]? call    Best call back number: [phone]

## 2022-04-22 ENCOUNTER — OFFICE VISIT (OUTPATIENT)
Dept: URGENT CARE | Facility: CLINIC | Age: 67
End: 2022-04-22
Payer: COMMERCIAL

## 2022-04-22 VITALS
DIASTOLIC BLOOD PRESSURE: 84 MMHG | WEIGHT: 175 LBS | TEMPERATURE: 98 F | SYSTOLIC BLOOD PRESSURE: 155 MMHG | OXYGEN SATURATION: 96 % | BODY MASS INDEX: 32.2 KG/M2 | HEART RATE: 58 BPM | RESPIRATION RATE: 18 BRPM | HEIGHT: 62 IN

## 2022-04-22 DIAGNOSIS — L30.9 ACUTE DERMATITIS: Primary | ICD-10-CM

## 2022-04-22 PROCEDURE — 1159F MED LIST DOCD IN RCRD: CPT | Mod: CPTII,S$GLB,, | Performed by: NURSE PRACTITIONER

## 2022-04-22 PROCEDURE — 1160F RVW MEDS BY RX/DR IN RCRD: CPT | Mod: CPTII,S$GLB,, | Performed by: NURSE PRACTITIONER

## 2022-04-22 PROCEDURE — 1159F PR MEDICATION LIST DOCUMENTED IN MEDICAL RECORD: ICD-10-PCS | Mod: CPTII,S$GLB,, | Performed by: NURSE PRACTITIONER

## 2022-04-22 PROCEDURE — 1126F PR PAIN SEVERITY QUANTIFIED, NO PAIN PRESENT: ICD-10-PCS | Mod: CPTII,S$GLB,, | Performed by: NURSE PRACTITIONER

## 2022-04-22 PROCEDURE — 1160F PR REVIEW ALL MEDS BY PRESCRIBER/CLIN PHARMACIST DOCUMENTED: ICD-10-PCS | Mod: CPTII,S$GLB,, | Performed by: NURSE PRACTITIONER

## 2022-04-22 PROCEDURE — 3008F PR BODY MASS INDEX (BMI) DOCUMENTED: ICD-10-PCS | Mod: CPTII,S$GLB,, | Performed by: NURSE PRACTITIONER

## 2022-04-22 PROCEDURE — 4010F PR ACE/ARB THEARPY RXD/TAKEN: ICD-10-PCS | Mod: CPTII,S$GLB,, | Performed by: NURSE PRACTITIONER

## 2022-04-22 PROCEDURE — 3079F PR MOST RECENT DIASTOLIC BLOOD PRESSURE 80-89 MM HG: ICD-10-PCS | Mod: CPTII,S$GLB,, | Performed by: NURSE PRACTITIONER

## 2022-04-22 PROCEDURE — 99203 OFFICE O/P NEW LOW 30 MIN: CPT | Mod: S$GLB,,, | Performed by: NURSE PRACTITIONER

## 2022-04-22 PROCEDURE — 1126F AMNT PAIN NOTED NONE PRSNT: CPT | Mod: CPTII,S$GLB,, | Performed by: NURSE PRACTITIONER

## 2022-04-22 PROCEDURE — 3079F DIAST BP 80-89 MM HG: CPT | Mod: CPTII,S$GLB,, | Performed by: NURSE PRACTITIONER

## 2022-04-22 PROCEDURE — 99203 PR OFFICE/OUTPT VISIT, NEW, LEVL III, 30-44 MIN: ICD-10-PCS | Mod: S$GLB,,, | Performed by: NURSE PRACTITIONER

## 2022-04-22 PROCEDURE — 3077F PR MOST RECENT SYSTOLIC BLOOD PRESSURE >= 140 MM HG: ICD-10-PCS | Mod: CPTII,S$GLB,, | Performed by: NURSE PRACTITIONER

## 2022-04-22 PROCEDURE — 4010F ACE/ARB THERAPY RXD/TAKEN: CPT | Mod: CPTII,S$GLB,, | Performed by: NURSE PRACTITIONER

## 2022-04-22 PROCEDURE — 3077F SYST BP >= 140 MM HG: CPT | Mod: CPTII,S$GLB,, | Performed by: NURSE PRACTITIONER

## 2022-04-22 PROCEDURE — 3008F BODY MASS INDEX DOCD: CPT | Mod: CPTII,S$GLB,, | Performed by: NURSE PRACTITIONER

## 2022-04-22 RX ORDER — TRIAMCINOLONE ACETONIDE 1 MG/G
OINTMENT TOPICAL 2 TIMES DAILY
Qty: 30 G | Refills: 0 | Status: SHIPPED | OUTPATIENT
Start: 2022-04-22 | End: 2023-02-07

## 2022-04-22 NOTE — PROGRESS NOTES
"Subjective:       Patient ID: Riley Marcus is a 67 y.o. female.    Vitals:  height is 5' 2" (1.575 m) and weight is 79.4 kg (175 lb). Her temperature is 97.8 °F (36.6 °C). Her blood pressure is 155/84 (abnormal) and her pulse is 58 (abnormal). Her respiration is 18 and oxygen saturation is 96%.     Chief Complaint: Rash    Pt states that she has a rash on her chest that started on yesterday 4/21. Pt is using cortisone cream and states the rash is red and itchy . Pt is worried that it may be shingles     Rash  This is a new problem. The current episode started yesterday. The problem is unchanged. The affected locations include the chest. The rash is characterized by itchiness. She was exposed to nothing. Pertinent negatives include no anorexia, congestion, cough, diarrhea, eye pain, facial edema, fatigue, fever, joint pain, nail changes, rhinorrhea, shortness of breath, sore throat or vomiting. Past treatments include anti-itch cream. The treatment provided mild relief.       Constitution: Negative for fatigue and fever.   HENT: Negative for congestion and sore throat.    Eyes: Negative for eye pain.   Respiratory: Negative for cough and shortness of breath.    Gastrointestinal: Negative for vomiting and diarrhea.   Skin: Positive for rash. Negative for erythema and hives.   Allergic/Immunologic: Positive for itching. Negative for hives.       Objective:      Physical Exam   Constitutional: She is oriented to person, place, and time. She appears well-developed.  Non-toxic appearance. She does not appear ill. No distress.   HENT:   Head: Normocephalic and atraumatic. Head is without abrasion, without contusion and without laceration.   Ears:   Right Ear: External ear normal.   Left Ear: External ear normal.   Nose: Nose normal.   Mouth/Throat: Oropharynx is clear and moist and mucous membranes are normal.   Eyes: Conjunctivae, EOM and lids are normal. Pupils are equal, round, and reactive to light.   Neck: " Trachea normal and phonation normal. Neck supple.   Cardiovascular: Normal rate, regular rhythm and normal heart sounds.   Pulmonary/Chest: Effort normal and breath sounds normal. No stridor. No respiratory distress.   Musculoskeletal: Normal range of motion.         General: Normal range of motion.   Neurological: She is alert and oriented to person, place, and time.   Skin: Skin is warm, dry, intact, not diaphoretic, rash (mild papular rash to chest wall, see photos. ) and not vesicular. Capillary refill takes less than 2 seconds. No abrasion, No burn, No bruising, No erythema and No ecchymosis   Psychiatric: Her speech is normal and behavior is normal. Judgment and thought content normal.   Nursing note and vitals reviewed.            Assessment:       1. Acute dermatitis          Plan:         Acute dermatitis  -     triamcinolone acetonide 0.1% (KENALOG) 0.1 % ointment; Apply topically 2 (two) times daily. for 10 days  Dispense: 30 g; Refill: 0      Patient Instructions   Cold compresses.  If your condition worsens or fails to improve we recommend that you receive another evaluation at the ER immediately or contact your PCP to discuss your concerns or return here. You must understand that you've received an urgent care treatment only and that you may be released before all your medical problems are known or treated. You the patient will arrange for followup care as instructed.   [id number] should be used daily for the next 5-7 days to prevent or suppress the itching. You can take Benadryl 25 mg at bedtime as well.   Return here or go to the ER as needed for worsening symptom.

## 2022-04-22 NOTE — PATIENT INSTRUCTIONS
Cold compresses.  If your condition worsens or fails to improve we recommend that you receive another evaluation at the ER immediately or contact your PCP to discuss your concerns or return here. You must understand that you've received an urgent care treatment only and that you may be released before all your medical problems are known or treated. You the patient will arrange for followup care as instructed.   [id number] should be used daily for the next 5-7 days to prevent or suppress the itching. You can take Benadryl 25 mg at bedtime as well.   Return here or go to the ER as needed for worsening symptom.

## 2022-05-03 ENCOUNTER — TELEPHONE (OUTPATIENT)
Dept: FAMILY MEDICINE | Facility: CLINIC | Age: 67
End: 2022-05-03
Payer: COMMERCIAL

## 2022-05-20 ENCOUNTER — OFFICE VISIT (OUTPATIENT)
Dept: URGENT CARE | Facility: CLINIC | Age: 67
End: 2022-05-20
Payer: COMMERCIAL

## 2022-05-20 VITALS
TEMPERATURE: 100 F | HEART RATE: 102 BPM | RESPIRATION RATE: 18 BRPM | SYSTOLIC BLOOD PRESSURE: 104 MMHG | DIASTOLIC BLOOD PRESSURE: 72 MMHG | BODY MASS INDEX: 32.57 KG/M2 | OXYGEN SATURATION: 97 % | HEIGHT: 62 IN | WEIGHT: 177 LBS

## 2022-05-20 DIAGNOSIS — J18.9 PNEUMONIA OF RIGHT MIDDLE LOBE DUE TO INFECTIOUS ORGANISM: Primary | ICD-10-CM

## 2022-05-20 DIAGNOSIS — R06.02 SHORTNESS OF BREATH ON EXERTION: ICD-10-CM

## 2022-05-20 LAB
CTP QC/QA: YES
CTP QC/QA: YES
POC MOLECULAR INFLUENZA A AGN: NEGATIVE
POC MOLECULAR INFLUENZA B AGN: NEGATIVE
SARS-COV-2 RDRP RESP QL NAA+PROBE: NEGATIVE

## 2022-05-20 PROCEDURE — 4010F ACE/ARB THERAPY RXD/TAKEN: CPT | Mod: CPTII,S$GLB,,

## 2022-05-20 PROCEDURE — 87502 POCT INFLUENZA A/B MOLECULAR: ICD-10-PCS | Mod: QW,S$GLB,,

## 2022-05-20 PROCEDURE — 4010F PR ACE/ARB THEARPY RXD/TAKEN: ICD-10-PCS | Mod: CPTII,S$GLB,,

## 2022-05-20 PROCEDURE — 1125F PR PAIN SEVERITY QUANTIFIED, PAIN PRESENT: ICD-10-PCS | Mod: CPTII,S$GLB,,

## 2022-05-20 PROCEDURE — 71046 XR CHEST PA AND LATERAL: ICD-10-PCS | Mod: S$GLB,,, | Performed by: RADIOLOGY

## 2022-05-20 PROCEDURE — U0002 COVID-19 LAB TEST NON-CDC: HCPCS | Mod: QW,S$GLB,,

## 2022-05-20 PROCEDURE — 99213 PR OFFICE/OUTPT VISIT, EST, LEVL III, 20-29 MIN: ICD-10-PCS | Mod: S$GLB,,,

## 2022-05-20 PROCEDURE — 1159F PR MEDICATION LIST DOCUMENTED IN MEDICAL RECORD: ICD-10-PCS | Mod: CPTII,S$GLB,,

## 2022-05-20 PROCEDURE — 87502 INFLUENZA DNA AMP PROBE: CPT | Mod: QW,S$GLB,,

## 2022-05-20 PROCEDURE — 99213 OFFICE O/P EST LOW 20 MIN: CPT | Mod: S$GLB,,,

## 2022-05-20 PROCEDURE — 1160F PR REVIEW ALL MEDS BY PRESCRIBER/CLIN PHARMACIST DOCUMENTED: ICD-10-PCS | Mod: CPTII,S$GLB,,

## 2022-05-20 PROCEDURE — 3078F PR MOST RECENT DIASTOLIC BLOOD PRESSURE < 80 MM HG: ICD-10-PCS | Mod: CPTII,S$GLB,,

## 2022-05-20 PROCEDURE — 3078F DIAST BP <80 MM HG: CPT | Mod: CPTII,S$GLB,,

## 2022-05-20 PROCEDURE — 1160F RVW MEDS BY RX/DR IN RCRD: CPT | Mod: CPTII,S$GLB,,

## 2022-05-20 PROCEDURE — 1159F MED LIST DOCD IN RCRD: CPT | Mod: CPTII,S$GLB,,

## 2022-05-20 PROCEDURE — 3008F BODY MASS INDEX DOCD: CPT | Mod: CPTII,S$GLB,,

## 2022-05-20 PROCEDURE — 1125F AMNT PAIN NOTED PAIN PRSNT: CPT | Mod: CPTII,S$GLB,,

## 2022-05-20 PROCEDURE — 3074F PR MOST RECENT SYSTOLIC BLOOD PRESSURE < 130 MM HG: ICD-10-PCS | Mod: CPTII,S$GLB,,

## 2022-05-20 PROCEDURE — 71046 X-RAY EXAM CHEST 2 VIEWS: CPT | Mod: S$GLB,,, | Performed by: RADIOLOGY

## 2022-05-20 PROCEDURE — 3074F SYST BP LT 130 MM HG: CPT | Mod: CPTII,S$GLB,,

## 2022-05-20 PROCEDURE — U0002: ICD-10-PCS | Mod: QW,S$GLB,,

## 2022-05-20 PROCEDURE — 3008F PR BODY MASS INDEX (BMI) DOCUMENTED: ICD-10-PCS | Mod: CPTII,S$GLB,,

## 2022-05-20 RX ORDER — AZITHROMYCIN 250 MG/1
TABLET, FILM COATED ORAL
Qty: 6 TABLET | Refills: 0 | Status: SHIPPED | OUTPATIENT
Start: 2022-05-20 | End: 2022-05-25

## 2022-05-20 RX ORDER — AMOXICILLIN AND CLAVULANATE POTASSIUM 875; 125 MG/1; MG/1
1 TABLET, FILM COATED ORAL 2 TIMES DAILY
Qty: 10 TABLET | Refills: 0 | Status: SHIPPED | OUTPATIENT
Start: 2022-05-20 | End: 2022-05-25

## 2022-05-20 NOTE — PROGRESS NOTES
"Subjective:       Patient ID: Riley Marcus is a 67 y.o. female.    Vitals:  height is 5' 2" (1.575 m) and weight is 80.3 kg (177 lb). Her temperature is 100.1 °F (37.8 °C). Her blood pressure is 104/72 and her pulse is 102. Her respiration is 18 and oxygen saturation is 97%.     Chief Complaint: Sore Throat    Patient is a 67-year-old female with a history of hypertension, hyperlipidemia, GERD, stage III CKD, gout who presents with headache, body aches, sore throat, fatigue, congestion, sneezing, cough, low-grade fever (100.1) that started yesterday.  Reports some shortness of breath with exertion.  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  Has not taken anything for symptoms.  Denies any chest pain, nausea, vomiting, diaphoresis, dizziness, loss of consciousness, acute vision changes.    Sore Throat   This is a new problem. The current episode started yesterday. The problem has been gradually worsening. The pain is worse on the left side. Maximum temperature: 100.1. The pain is at a severity of 7/10. The pain is moderate. Associated symptoms include congestion, coughing and headaches. Pertinent negatives include no abdominal pain, diarrhea, ear discharge, ear pain, neck pain, shortness of breath or vomiting. She has had no exposure to strep or mono. She has tried nothing for the symptoms. The treatment provided no relief.       Constitution: Positive for fatigue and fever. Negative for chills.   HENT: Positive for congestion, postnasal drip and sore throat. Negative for ear pain, ear discharge, sinus pain and sinus pressure.    Neck: Negative for neck pain and neck stiffness.   Cardiovascular: Positive for sob on exertion. Negative for chest pain, leg swelling and palpitations.   Eyes: Negative for photophobia and vision loss.   Respiratory: Positive for cough. Negative for chest tightness, shortness of breath and wheezing.    Gastrointestinal: Negative for " abdominal pain, nausea, vomiting, constipation and diarrhea.   Genitourinary: Negative for dysuria.   Musculoskeletal:        Myalgias   Skin: Negative for rash.   Allergic/Immunologic: Positive for sneezing. Negative for itching.   Neurological: Positive for headaches. Negative for dizziness, light-headedness, loss of balance, disorientation, altered mental status, loss of consciousness, numbness and tingling.   Psychiatric/Behavioral: Negative for altered mental status, disorientation and confusion.       Objective:      Physical Exam   Constitutional: She is oriented to person, place, and time. She appears well-developed. She is cooperative.  Non-toxic appearance. She does not appear ill. No distress.   HENT:   Head: Normocephalic and atraumatic.   Ears:   Right Ear: Hearing, tympanic membrane, external ear and ear canal normal.   Left Ear: Hearing, tympanic membrane, external ear and ear canal normal.   Nose: Rhinorrhea and congestion present. No mucosal edema or nasal deformity. No epistaxis. Right sinus exhibits no maxillary sinus tenderness and no frontal sinus tenderness. Left sinus exhibits no maxillary sinus tenderness and no frontal sinus tenderness.   Mouth/Throat: Uvula is midline and mucous membranes are normal. Mucous membranes are moist. No trismus in the jaw. Normal dentition. No uvula swelling. Posterior oropharyngeal erythema present. No oropharyngeal exudate or posterior oropharyngeal edema. Tonsils are 1+ on the right. Tonsils are 1+ on the left. No tonsillar exudate. Oropharynx is clear.   Eyes: Conjunctivae and lids are normal. Right eye exhibits no discharge. Left eye exhibits no discharge. No scleral icterus.   Neck: Trachea normal and phonation normal. Neck supple. No edema present. No erythema present. No neck rigidity present.   Cardiovascular: Regular rhythm, normal heart sounds and normal pulses. Tachycardia present.   Pulmonary/Chest: Effort normal and breath sounds normal. No  respiratory distress. She has no decreased breath sounds. She has no wheezes. She has no rhonchi. She has no rales.         Comments: Lungs CTAB.    Abdominal: Normal appearance.   Musculoskeletal: Normal range of motion.         General: No deformity. Normal range of motion.   Neurological: no focal deficit. She is alert and oriented to person, place, and time. She has normal sensation and intact cranial nerves. She exhibits normal muscle tone. Gait and coordination normal. Coordination normal. GCS eye subscore is 4. GCS verbal subscore is 5. GCS motor subscore is 6.   Skin: Skin is warm, dry, intact, not diaphoretic and not pale. Capillary refill takes less than 2 seconds.   Psychiatric: Her speech is normal and behavior is normal. Judgment and thought content normal.   Nursing note and vitals reviewed.    Results for orders placed or performed in visit on 05/20/22   POCT COVID-19 Rapid Screening   Result Value Ref Range    POC Rapid COVID Negative Negative     Acceptable Yes    POCT Influenza A/B MOLECULAR   Result Value Ref Range    POC Molecular Influenza A Ag Negative Negative, Not Reported    POC Molecular Influenza B Ag Negative Negative, Not Reported     Acceptable Yes      XR CHEST PA AND LATERAL    Result Date: 5/20/2022  EXAMINATION: XR CHEST PA AND LATERAL CLINICAL HISTORY: Shortness of breath TECHNIQUE: PA and lateral views of the chest were performed. COMPARISON: 04/12/2017 FINDINGS: Lungs are well expanded.  New bandlike opacity right mid.  Calcified granulomata right mid lung.  No acute consolidation pleural effusion or pneumothorax. Cardiac silhouette is normal in size.  Calcified mediastinal lymph nodes. Stable compression deformity midthoracic spine.     New bandlike opacity right mid lung.  It is possible this represents subsegmental atelectasis in the setting of lower airways infection or inflammation; no focal consolidation.  Follow-up chest radiograph recommended  "in 6 weeks to ensure resolution.  If it persists at that time, further evaluation with chest CT recommended. Electronically signed by: Ivette Escalera Date:    05/20/2022 Time:    16:59          Assessment:       1. Pneumonia of right middle lobe due to infectious organism    2. Shortness of breath on exertion          Plan:         Pneumonia of right middle lobe due to infectious organism  -     POCT COVID-19 Rapid Screening  -     POCT Influenza A/B MOLECULAR  -     amoxicillin-clavulanate 875-125mg (AUGMENTIN) 875-125 mg per tablet; Take 1 tablet by mouth 2 (two) times daily. for 5 days  Dispense: 10 tablet; Refill: 0  -     azithromycin (Z-CANDICE) 250 MG tablet; Take 2 tablets by mouth on day 1; Take 1 tablet by mouth on days 2-5  Dispense: 6 tablet; Refill: 0    Shortness of breath on exertion  -     XR CHEST PA AND LATERAL; Future; Expected date: 05/20/2022           Medical Decision Making:   Initial Assessment:   Patient is a 67-year-old female with a history of hypertension, hyperlipidemia, GERD, stage III CKD, gout who presents with headache, body aches, sore throat, fatigue, congestion, sneezing, cough, low-grade fever (100.1) that started yesterday.  Reports some shortness of breath with exertion.  Temp 100.1°.  Mildly tachycardic to 102. /72.  RR 18.  SpO2 at triage initially 94%.  On recheck, SpO2 97%.  Remains 96-97% with ambulation.  On exam, patient nontoxic and in no acute distress.  Lungs are clear to auscultation bilaterally.  No respiratory distress.  Neurologically intact with no focal deficits.  1+ tonsils without exudate.  TM clear bilaterally.  No sinus tenderness.  Differential diagnosis includes but not limited to URI, COVID, influenza, pneumonia. COVID and flu negative. CXR shows "New bandlike opacity right mid lung.  It is possible this represents subsegmental atelectasis in the setting of lower airways infection or inflammation; no focal consolidation." Will treat for pneumonia with " Augmentin and azithromycin.  Discussed supportive care and other over-the-counter medications for symptoms.  Patient is to follow-up with PCP.  Clinic return and strict ED precautions given.  Patient verbalizes understanding and agrees with plan.       Patient Instructions   Pneumonia  If your condition worsens or fails to improve we recommend that you receive another evaluation at the ER immediately or contact your PCP to discuss your concerns or return here. You must understand that you've received an urgent care treatment only and that you may be released before all your medical problems are known or treated. You the patient will arrange for follouwp care as instructed.   Rest and fluids are important  You were prescribed antibiotics, please take them to completion.  Take inhaler as prescribed and needed for wheezing  Please follow up with your primary care doctor or specialist in the next 48-72hrs  If you smoke, please stop smoking.

## 2022-05-20 NOTE — PATIENT INSTRUCTIONS
Pneumonia  If your condition worsens or fails to improve we recommend that you receive another evaluation at the ER immediately or contact your PCP to discuss your concerns or return here. You must understand that you've received an urgent care treatment only and that you may be released before all your medical problems are known or treated. You the patient will arrange for follouwp care as instructed.   Rest and fluids are important  You were prescribed antibiotics, please take them to completion.  Take inhaler as prescribed and needed for wheezing  Please follow up with your primary care doctor or specialist in the next 48-72hrs  If you smoke, please stop smoking.

## 2022-06-23 ENCOUNTER — PATIENT MESSAGE (OUTPATIENT)
Dept: GASTROENTEROLOGY | Facility: CLINIC | Age: 67
End: 2022-06-23
Payer: COMMERCIAL

## 2022-06-23 ENCOUNTER — TELEPHONE (OUTPATIENT)
Dept: GASTROENTEROLOGY | Facility: CLINIC | Age: 67
End: 2022-06-23
Payer: COMMERCIAL

## 2022-06-23 DIAGNOSIS — K21.9 GASTRIC REFLUX: Primary | ICD-10-CM

## 2022-07-13 ENCOUNTER — IMMUNIZATION (OUTPATIENT)
Dept: PHARMACY | Facility: CLINIC | Age: 67
End: 2022-07-13

## 2022-07-13 ENCOUNTER — PATIENT MESSAGE (OUTPATIENT)
Dept: GASTROENTEROLOGY | Facility: CLINIC | Age: 67
End: 2022-07-13

## 2022-07-20 ENCOUNTER — ANESTHESIA (OUTPATIENT)
Dept: ENDOSCOPY | Facility: HOSPITAL | Age: 67
End: 2022-07-20
Payer: COMMERCIAL

## 2022-07-20 ENCOUNTER — ANESTHESIA EVENT (OUTPATIENT)
Dept: ENDOSCOPY | Facility: HOSPITAL | Age: 67
End: 2022-07-20
Payer: COMMERCIAL

## 2022-07-20 ENCOUNTER — HOSPITAL ENCOUNTER (OUTPATIENT)
Facility: HOSPITAL | Age: 67
Discharge: HOME | End: 2022-07-20
Attending: INTERNAL MEDICINE | Admitting: INTERNAL MEDICINE
Payer: COMMERCIAL

## 2022-07-20 VITALS
TEMPERATURE: 98 F | OXYGEN SATURATION: 98 % | RESPIRATION RATE: 18 BRPM | HEART RATE: 70 BPM | SYSTOLIC BLOOD PRESSURE: 155 MMHG | DIASTOLIC BLOOD PRESSURE: 78 MMHG

## 2022-07-20 DIAGNOSIS — Z12.11 SCREENING FOR COLON CANCER: ICD-10-CM

## 2022-07-20 PROCEDURE — 37000008 HC ANESTHESIA 1ST 15 MINUTES: Performed by: INTERNAL MEDICINE

## 2022-07-20 PROCEDURE — 43239 PR EGD, FLEX, W/BIOPSY, SGL/MULTI: ICD-10-PCS | Mod: 51,,, | Performed by: INTERNAL MEDICINE

## 2022-07-20 PROCEDURE — D9220A PRA ANESTHESIA: ICD-10-PCS | Mod: 33,ANES,, | Performed by: ANESTHESIOLOGY

## 2022-07-20 PROCEDURE — D9220A PRA ANESTHESIA: Mod: 33,ANES,, | Performed by: ANESTHESIOLOGY

## 2022-07-20 PROCEDURE — G0121 COLON CA SCRN NOT HI RSK IND: ICD-10-PCS | Mod: ,,, | Performed by: INTERNAL MEDICINE

## 2022-07-20 PROCEDURE — G0121 COLON CA SCRN NOT HI RSK IND: HCPCS | Performed by: INTERNAL MEDICINE

## 2022-07-20 PROCEDURE — 43239 EGD BIOPSY SINGLE/MULTIPLE: CPT | Performed by: INTERNAL MEDICINE

## 2022-07-20 PROCEDURE — 88305 TISSUE EXAM BY PATHOLOGIST: CPT | Performed by: PATHOLOGY

## 2022-07-20 PROCEDURE — 43239 EGD BIOPSY SINGLE/MULTIPLE: CPT | Mod: 51,,, | Performed by: INTERNAL MEDICINE

## 2022-07-20 PROCEDURE — G0121 COLON CA SCRN NOT HI RSK IND: HCPCS | Mod: ,,, | Performed by: INTERNAL MEDICINE

## 2022-07-20 PROCEDURE — 88305 TISSUE EXAM BY PATHOLOGIST: ICD-10-PCS | Mod: 26,,, | Performed by: PATHOLOGY

## 2022-07-20 PROCEDURE — 27201012 HC FORCEPS, HOT/COLD, DISP: Performed by: INTERNAL MEDICINE

## 2022-07-20 PROCEDURE — 88305 TISSUE EXAM BY PATHOLOGIST: CPT | Mod: 26,,, | Performed by: PATHOLOGY

## 2022-07-20 PROCEDURE — 25000003 PHARM REV CODE 250: Performed by: NURSE ANESTHETIST, CERTIFIED REGISTERED

## 2022-07-20 PROCEDURE — 63600175 PHARM REV CODE 636 W HCPCS: Performed by: NURSE ANESTHETIST, CERTIFIED REGISTERED

## 2022-07-20 PROCEDURE — D9220A PRA ANESTHESIA: ICD-10-PCS | Mod: 33,CRNA,, | Performed by: NURSE ANESTHETIST, CERTIFIED REGISTERED

## 2022-07-20 PROCEDURE — D9220A PRA ANESTHESIA: Mod: 33,CRNA,, | Performed by: NURSE ANESTHETIST, CERTIFIED REGISTERED

## 2022-07-20 PROCEDURE — 37000009 HC ANESTHESIA EA ADD 15 MINS: Performed by: INTERNAL MEDICINE

## 2022-07-20 RX ORDER — PROPOFOL 10 MG/ML
INJECTION, EMULSION INTRAVENOUS
Status: DISCONTINUED
Start: 2022-07-20 | End: 2022-07-20 | Stop reason: HOSPADM

## 2022-07-20 RX ORDER — SODIUM CHLORIDE 9 MG/ML
INJECTION, SOLUTION INTRAVENOUS CONTINUOUS
Status: DISCONTINUED | OUTPATIENT
Start: 2022-07-20 | End: 2022-07-20 | Stop reason: HOSPADM

## 2022-07-20 RX ORDER — LIDOCAINE HYDROCHLORIDE 20 MG/ML
INJECTION INTRAVENOUS
Status: DISCONTINUED | OUTPATIENT
Start: 2022-07-20 | End: 2022-07-20

## 2022-07-20 RX ORDER — PROPOFOL 10 MG/ML
VIAL (ML) INTRAVENOUS
Status: DISCONTINUED | OUTPATIENT
Start: 2022-07-20 | End: 2022-07-20

## 2022-07-20 RX ORDER — LIDOCAINE HYDROCHLORIDE 20 MG/ML
INJECTION, SOLUTION EPIDURAL; INFILTRATION; INTRACAUDAL; PERINEURAL
Status: DISCONTINUED
Start: 2022-07-20 | End: 2022-07-20 | Stop reason: HOSPADM

## 2022-07-20 RX ADMIN — PROPOFOL 100 MG: 10 INJECTION, EMULSION INTRAVENOUS at 07:07

## 2022-07-20 RX ADMIN — PROPOFOL 50 MG: 10 INJECTION, EMULSION INTRAVENOUS at 08:07

## 2022-07-20 RX ADMIN — LIDOCAINE HYDROCHLORIDE 100 MG: 20 INJECTION, SOLUTION INTRAVENOUS at 07:07

## 2022-07-20 RX ADMIN — PROPOFOL 100 MG: 10 INJECTION, EMULSION INTRAVENOUS at 08:07

## 2022-07-20 NOTE — TRANSFER OF CARE
Anesthesia Transfer of Care Note    Patient: Riley Marcus    Procedure(s) Performed: Procedure(s) (LRB):  ESOPHAGOGASTRODUODENOSCOPY (EGD) (N/A)  COLONOSCOPY (N/A)    Patient location: GI    Anesthesia Type: general    Transport from OR: Transported from OR on room air with adequate spontaneous ventilation    Post pain: adequate analgesia    Post assessment: no apparent anesthetic complications and tolerated procedure well    Post vital signs: stable    Level of consciousness: awake    Nausea/Vomiting: no nausea/vomiting    Complications: none    Transfer of care protocol was followed      Last vitals:   Visit Vitals  /68 (BP Location: Right arm, Patient Position: Lying)   Pulse 86   Temp 36.4 °C (97.6 °F) (Oral)   Resp 16   SpO2 97%   Breastfeeding No

## 2022-07-20 NOTE — ANESTHESIA POSTPROCEDURE EVALUATION
Anesthesia Post Evaluation    Patient: Riley Marcus    Procedure(s) Performed: Procedure(s) (LRB):  ESOPHAGOGASTRODUODENOSCOPY (EGD) (N/A)  COLONOSCOPY (N/A)    Final Anesthesia Type: general      Patient location during evaluation: GI PACU  Patient participation: Yes- Able to Participate  Level of consciousness: awake and alert  Post-procedure vital signs: reviewed and stable  Pain management: adequate  Airway patency: patent    PONV status at discharge: No PONV  Anesthetic complications: no      Cardiovascular status: blood pressure returned to baseline and hemodynamically stable  Respiratory status: unassisted and spontaneous ventilation  Hydration status: euvolemic  Follow-up not needed.          Vitals Value Taken Time   /67 07/20/22 0835   Temp 36.4 °C (97.6 °F) 07/20/22 0831   Pulse 75 07/20/22 0835   Resp 15 07/20/22 0835   SpO2 97 % 07/20/22 0835         No case tracking events are documented in the log.      Pain/Jodi Score: Jodi Score: 10 (7/20/2022  8:45 AM)

## 2022-07-20 NOTE — ANESTHESIA PREPROCEDURE EVALUATION
07/20/2022  Riley Marcus is a 67 y.o., female.      Pre-op Assessment     I have reviewed the Nursing Notes.       Review of Systems  Anesthesia Hx:  No problems with previous Anesthesia    Social:  Non-Smoker    Cardiovascular:   Denies Pacemaker. Hypertension  Denies Valvular problems/Murmurs.  Denies CABG/stent. Dysrhythmias (freq PVCs)  hyperlipidemia ECG has been reviewed. 2018 echo: 55%   Pulmonary:  Pulmonary Normal    Renal/:   Chronic Renal Disease, CRI    Hepatic/GI:   Bowel Prep. Denies PUD. GERD Denies Liver Disease. Denies Hepatitis.    Musculoskeletal:   Arthritis     Neurological:  Neurology Normal    Endocrine:  Endocrine Normal        Physical Exam  General: Well nourished, Cooperative, Alert and Oriented    Airway:  Mallampati: III   Mouth Opening: Normal  TM Distance: Normal  Tongue: Normal  Neck ROM: Normal ROM        Anesthesia Plan  Type of Anesthesia, risks & benefits discussed:    Anesthesia Type: Gen Natural Airway  Intra-op Monitoring Plan: Standard ASA Monitors  Induction:  IV  Informed Consent: Informed consent signed with the Patient and all parties understand the risks and agree with anesthesia plan.  All questions answered.   ASA Score: 2  Day of Surgery Review of History & Physical: H&P Update referred to the surgeon/provider.    Ready For Surgery From Anesthesia Perspective.     .

## 2022-07-20 NOTE — H&P
History and Physical      Chief complaints: Requesting screening colonscopy    History of Presenting Illness    Patient requesting colonoscopy.  Patient denies any abdominal pain, weight loss or blood in the stool.  There is no family history of colon cancer.  Patient also complaining of reflux symptoms. There is no dysphagia or melena.    Review of Systems   Constitutional: Negative for fever and appetite change.   HENT: Negative for sore throat, trouble swallowing and neck pain.   Eyes: Negative for photophobia and visual disturbance.   Respiratory: Negative for wheezing.   Cardiovascular: Negative for chest pain and palpitations.   Gastrointestinal: See HPI for details   Musculoskeletal: Negative for joint swelling and arthralgias.   Skin: Negative for rash and wound.   Neurological: Negative for dizziness, tremors and weakness.   Hematological: Negative.   Psychiatric/Behavioral: Negative for suicidal ideas and behavioral problems.     Past Medical History:   Diagnosis Date    Arthritis     Blood transfusion     Corneal abrasion 20 yrs    ? eye due to cls     GERD (gastroesophageal reflux disease)     Hyperlipidemia     Hypertension        Past Surgical History:   Procedure Laterality Date    Breast reduction      BREAST SURGERY      EYE SURGERY      Lasik bilat eyes    HYSTERECTOMY      LASER LAPAROSCOPY  1988    MINI-LAPAROTOMY  1988    OOPHORECTOMY      TOTAL REDUCTION MAMMOPLASTY         Family History   Problem Relation Age of Onset    Arthritis Mother     Heart disease Mother     Hypertension Mother     Cataracts Mother     Heart disease Father     Hypertension Father     Stroke Father     Diabetes Sister     Hypertension Sister     Hypertension Sister     Eczema Other     Arthritis Maternal Grandmother     Heart disease Maternal Grandmother     Hypertension Maternal Grandmother     Arthritis Maternal Grandfather     Arthritis Paternal Grandmother     Heart disease  Paternal Grandmother     Arthritis Paternal Grandfather     No Known Problems Brother     No Known Problems Maternal Aunt     No Known Problems Maternal Uncle     No Known Problems Paternal Aunt     No Known Problems Paternal Uncle     Ovarian cancer Neg Hx     Breast cancer Neg Hx     Anxiety disorder Neg Hx     Depression Neg Hx     Suicide Neg Hx     Amblyopia Neg Hx     Blindness Neg Hx     Cancer Neg Hx     Glaucoma Neg Hx     Macular degeneration Neg Hx     Retinal detachment Neg Hx     Strabismus Neg Hx     Thyroid disease Neg Hx     Colon cancer Neg Hx     Esophageal cancer Neg Hx        Social History     Socioeconomic History    Marital status:     Number of children: 0   Occupational History    Occupation: RN     Employer: [employer]   Tobacco Use    Smoking status: Never Smoker    Smokeless tobacco: Never Used   Substance and Sexual Activity    Alcohol use: Yes     Comment: RARELY    Drug use: No    Sexual activity: Not Currently     Partners: Male   Social History Narrative      several years ago    She has not been sexually-active since    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 Appears well-nourished.   HENT:   Mouth/Throat: Oropharynx is clear and moist.   Eyes: Pupils are equal, round, and reactive to light.   Neck: Neck supple.   Cardiovascular: Normal heart sounds.    Pulmonary/Chest: Effort normal and breath sounds normal.   Abdominal: Soft. Exhibits no mass. There is no tenderness. There is no guarding.   Musculoskeletal: Normal range of motion.   Lymphadenopathy: Has no cervical adenopathy.   Neurological:Alert and oriented to person, place, and time.   Skin: Skin is warm. No rash noted.   Psychiatric: Has a normal mood and affect.     Assessment:  Colon cancer screening  GERD    Plan:  Colonoscopy  EGD       I have reviewed the patient's medical history in detail and updated the computerized patient record

## 2022-07-20 NOTE — PROVATION PATIENT INSTRUCTIONS
Discharge Summary/Instructions after an Endoscopic Procedure  Patient Name: Rilye Marcus  Patient MRN: [id number]  Patient YOB: 1955  Wednesday, July 20, 2022  Chanda Hawkins MD  Dear patient,  As a result of recent federal legislation (The Federal Cures Act), you may   receive lab or pathology results from your procedure in your [facility]   account before your physician is able to contact you. Your physician or   their representative will relay the results to you with their   recommendations at their soonest availability.  Thank you,  RESTRICTIONS:  During your procedure today, you received medications for sedation.  These   medications may affect your judgment, balance and coordination.  Therefore,   for 24 hours, you have the following restrictions:   - DO NOT drive a car, operate machinery, make legal/financial decisions,   sign important papers or drink alcohol.    ACTIVITY:  Today: no heavy lifting, straining or running due to procedural   sedation/anesthesia.  The following day: return to full activity including work.  DIET:  Eat and drink normally unless instructed otherwise.     TREATMENT FOR COMMON SIDE EFFECTS:  - Mild abdominal pain, nausea, belching, bloating or excessive gas:  rest,   eat lightly and use a heating pad.  - Sore Throat: treat with throat lozenges and/or gargle with warm salt   water.  - Because air was used during the procedure, expelling large amounts of air   from your rectum or belching is normal.  - If a bowel prep was taken, you may not have a bowel movement for 1-3 days.    This is normal.  SYMPTOMS TO WATCH FOR AND REPORT TO YOUR PHYSICIAN:  1. Abdominal pain or bloating, other than gas cramps.  2. Chest pain.  3. Back pain.  4. Signs of infection such as: chills or fever occurring within 24 hours   after the procedure.  5. Rectal bleeding, which would show as bright red, maroon, or black stools.   (A tablespoon of blood from the rectum is not serious, especially  if   hemorrhoids are present.)  6. Vomiting.  7. Weakness or dizziness.  GO DIRECTLY TO THE NEAREST EMERGENCY ROOM IF YOU HAVE ANY OF THE FOLLOWING:      Difficulty breathing              Chills and/or fever over 101 F   Persistent vomiting and/or vomiting blood   Severe abdominal pain   Severe chest pain   Black, tarry stools   Bleeding- more than one tablespoon   Any other symptom or condition that you feel may need urgent attention  Your doctor recommends these additional instructions:  If any biopsies were taken, your doctors clinic will contact you in 1 to 2   weeks with any results.  - Await pathology results.   - Follow an antireflux regimen.   - Discharge patient to home.  For questions, problems or results please call your physician - Chanda Hawkins MD at Work:  [phone].  [facility] can be reached at [phone]     IF A COMPLICATION OR EMERGENCY SITUATION ARISES AND YOU ARE UNABLE TO REACH   YOUR PHYSICIAN - GO DIRECTLY TO THE EMERGENCY ROOM.  Chanda Hawkins MD  7/20/2022 8:37:11 AM  This report has been verified and signed electronically.  Dear patient,  As a result of recent federal legislation (The Federal Cures Act), you may   receive lab or pathology results from your procedure in your [facility]   account before your physician is able to contact you. Your physician or   their representative will relay the results to you with their   recommendations at their soonest availability.  Thank you,  PROVATION

## 2022-07-20 NOTE — OR NURSING
Pt awake, alert, denies pain or nausea, VSS, positive hopeful attitude, neets criteria for release from endoscopy.Patient states readiness to go home and verbalizes understanding of discharge instructions.

## 2022-07-20 NOTE — PROVATION PATIENT INSTRUCTIONS
Discharge Summary/Instructions after an Endoscopic Procedure  Patient Name: Riley Marcus  Patient MRN: [id number]  Patient YOB: 1955  Wednesday, July 20, 2022  Chanda Hawkins MD  Dear patient,  As a result of recent federal legislation (The Federal Cures Act), you may   receive lab or pathology results from your procedure in your [facility]   account before your physician is able to contact you. Your physician or   their representative will relay the results to you with their   recommendations at their soonest availability.  Thank you,  RESTRICTIONS:  During your procedure today, you received medications for sedation.  These   medications may affect your judgment, balance and coordination.  Therefore,   for 24 hours, you have the following restrictions:   - DO NOT drive a car, operate machinery, make legal/financial decisions,   sign important papers or drink alcohol.    ACTIVITY:  Today: no heavy lifting, straining or running due to procedural   sedation/anesthesia.  The following day: return to full activity including work.  DIET:  Eat and drink normally unless instructed otherwise.     TREATMENT FOR COMMON SIDE EFFECTS:  - Mild abdominal pain, nausea, belching, bloating or excessive gas:  rest,   eat lightly and use a heating pad.  - Sore Throat: treat with throat lozenges and/or gargle with warm salt   water.  - Because air was used during the procedure, expelling large amounts of air   from your rectum or belching is normal.  - If a bowel prep was taken, you may not have a bowel movement for 1-3 days.    This is normal.  SYMPTOMS TO WATCH FOR AND REPORT TO YOUR PHYSICIAN:  1. Abdominal pain or bloating, other than gas cramps.  2. Chest pain.  3. Back pain.  4. Signs of infection such as: chills or fever occurring within 24 hours   after the procedure.  5. Rectal bleeding, which would show as bright red, maroon, or black stools.   (A tablespoon of blood from the rectum is not serious, especially  if   hemorrhoids are present.)  6. Vomiting.  7. Weakness or dizziness.  GO DIRECTLY TO THE NEAREST EMERGENCY ROOM IF YOU HAVE ANY OF THE FOLLOWING:      Difficulty breathing              Chills and/or fever over 101 F   Persistent vomiting and/or vomiting blood   Severe abdominal pain   Severe chest pain   Black, tarry stools   Bleeding- more than one tablespoon   Any other symptom or condition that you feel may need urgent attention  Your doctor recommends these additional instructions:  If any biopsies were taken, your doctors clinic will contact you in 1 to 2   weeks with any results.  - Discharge patient to home.   - Repeat colonoscopy in 10 years for screening purposes.  For questions, problems or results please call your physician - Chanda Hawkins MD at Work:  [phone].  [facility] can be reached at [phone]     IF A COMPLICATION OR EMERGENCY SITUATION ARISES AND YOU ARE UNABLE TO REACH   YOUR PHYSICIAN - GO DIRECTLY TO THE EMERGENCY ROOM.  Chanda Hawkins MD  7/20/2022 8:40:33 AM  This report has been verified and signed electronically.  Dear patient,  As a result of recent federal legislation (The Federal Cures Act), you may   receive lab or pathology results from your procedure in your [facility]   account before your physician is able to contact you. Your physician or   their representative will relay the results to you with their   recommendations at their soonest availability.  Thank you,  PROVATION

## 2022-07-22 LAB
FINAL PATHOLOGIC DIAGNOSIS: NORMAL
GROSS: NORMAL
Lab: NORMAL

## 2022-07-25 ENCOUNTER — PATIENT MESSAGE (OUTPATIENT)
Dept: GASTROENTEROLOGY | Facility: CLINIC | Age: 67
End: 2022-07-25

## 2022-09-22 ENCOUNTER — IMMUNIZATION (OUTPATIENT)
Dept: OBSTETRICS AND GYNECOLOGY | Facility: CLINIC | Age: 67
End: 2022-09-22
Payer: COMMERCIAL

## 2022-09-22 DIAGNOSIS — Z23 NEED FOR VACCINATION: Primary | ICD-10-CM

## 2022-09-22 PROCEDURE — 0124A COVID-19, MRNA, LNP-S, BIVALENT BOOSTER, PF, 30 MCG/0.3 ML DOSE: CPT | Mod: PBBFAC | Performed by: FAMILY MEDICINE

## 2022-09-22 PROCEDURE — 91312 COVID-19, MRNA, LNP-S, BIVALENT BOOSTER, PF, 30 MCG/0.3 ML DOSE: CPT | Mod: S$GLB,,, | Performed by: FAMILY MEDICINE

## 2022-09-22 PROCEDURE — 91312 COVID-19, MRNA, LNP-S, BIVALENT BOOSTER, PF, 30 MCG/0.3 ML DOSE: ICD-10-PCS | Mod: S$GLB,,, | Performed by: FAMILY MEDICINE

## 2022-10-11 PROBLEM — M70.62 TROCHANTERIC BURSITIS OF LEFT HIP: Status: ACTIVE | Noted: 2022-10-11

## 2022-10-11 PROBLEM — T46.6X5A MYALGIA DUE TO STATIN: Status: ACTIVE | Noted: 2022-10-11

## 2022-10-11 PROBLEM — M79.10 MYALGIA DUE TO STATIN: Status: ACTIVE | Noted: 2022-10-11

## 2022-10-27 ENCOUNTER — PES CALL (OUTPATIENT)
Dept: ADMINISTRATIVE | Facility: CLINIC | Age: 67
End: 2022-10-27

## 2022-11-14 DIAGNOSIS — L60.0 INGROWN TOENAIL: Primary | ICD-10-CM

## 2022-11-22 ENCOUNTER — OFFICE VISIT (OUTPATIENT)
Dept: PODIATRY | Facility: CLINIC | Age: 67
End: 2022-11-22
Payer: MEDICARE

## 2022-11-22 VITALS — BODY MASS INDEX: 32.76 KG/M2 | WEIGHT: 178 LBS | HEIGHT: 62 IN

## 2022-11-22 DIAGNOSIS — B35.1 ONYCHOMYCOSIS DUE TO DERMATOPHYTE: ICD-10-CM

## 2022-11-22 DIAGNOSIS — L60.0 INGROWN TOENAIL: Primary | ICD-10-CM

## 2022-11-22 DIAGNOSIS — M79.672 LEFT FOOT PAIN: ICD-10-CM

## 2022-11-22 PROCEDURE — 1126F AMNT PAIN NOTED NONE PRSNT: CPT | Mod: CPTII,S$GLB,, | Performed by: PODIATRIST

## 2022-11-22 PROCEDURE — 4010F ACE/ARB THERAPY RXD/TAKEN: CPT | Mod: CPTII,S$GLB,, | Performed by: PODIATRIST

## 2022-11-22 PROCEDURE — 99999 PR PBB SHADOW E&M-EST. PATIENT-LVL V: ICD-10-PCS | Mod: PBBFAC,,, | Performed by: PODIATRIST

## 2022-11-22 PROCEDURE — 1126F PR PAIN SEVERITY QUANTIFIED, NO PAIN PRESENT: ICD-10-PCS | Mod: CPTII,S$GLB,, | Performed by: PODIATRIST

## 2022-11-22 PROCEDURE — 99999 PR PBB SHADOW E&M-EST. PATIENT-LVL V: CPT | Mod: PBBFAC,,, | Performed by: PODIATRIST

## 2022-11-22 PROCEDURE — 1101F PT FALLS ASSESS-DOCD LE1/YR: CPT | Mod: CPTII,S$GLB,, | Performed by: PODIATRIST

## 2022-11-22 PROCEDURE — 99203 OFFICE O/P NEW LOW 30 MIN: CPT | Mod: S$GLB,,, | Performed by: PODIATRIST

## 2022-11-22 PROCEDURE — 1159F MED LIST DOCD IN RCRD: CPT | Mod: CPTII,S$GLB,, | Performed by: PODIATRIST

## 2022-11-22 PROCEDURE — 1159F PR MEDICATION LIST DOCUMENTED IN MEDICAL RECORD: ICD-10-PCS | Mod: CPTII,S$GLB,, | Performed by: PODIATRIST

## 2022-11-22 PROCEDURE — 99203 PR OFFICE/OUTPT VISIT, NEW, LEVL III, 30-44 MIN: ICD-10-PCS | Mod: S$GLB,,, | Performed by: PODIATRIST

## 2022-11-22 PROCEDURE — 1101F PR PT FALLS ASSESS DOC 0-1 FALLS W/OUT INJ PAST YR: ICD-10-PCS | Mod: CPTII,S$GLB,, | Performed by: PODIATRIST

## 2022-11-22 PROCEDURE — 4010F PR ACE/ARB THEARPY RXD/TAKEN: ICD-10-PCS | Mod: CPTII,S$GLB,, | Performed by: PODIATRIST

## 2022-11-22 PROCEDURE — 3008F PR BODY MASS INDEX (BMI) DOCUMENTED: ICD-10-PCS | Mod: CPTII,S$GLB,, | Performed by: PODIATRIST

## 2022-11-22 PROCEDURE — 3008F BODY MASS INDEX DOCD: CPT | Mod: CPTII,S$GLB,, | Performed by: PODIATRIST

## 2022-11-22 PROCEDURE — 3288F PR FALLS RISK ASSESSMENT DOCUMENTED: ICD-10-PCS | Mod: CPTII,S$GLB,, | Performed by: PODIATRIST

## 2022-11-22 PROCEDURE — 3288F FALL RISK ASSESSMENT DOCD: CPT | Mod: CPTII,S$GLB,, | Performed by: PODIATRIST

## 2022-11-22 RX ORDER — CICLOPIROX 80 MG/ML
SOLUTION TOPICAL NIGHTLY
Qty: 6.6 ML | Refills: 3 | Status: SHIPPED | OUTPATIENT
Start: 2022-11-22

## 2022-11-25 NOTE — PROGRESS NOTES
Subjective:      Patient ID: Riley Marcus is a 67 y.o. female.    Chief Complaint: Ingrown Toenail (Bilateral Great toes)    Riley is a 67 y.o. female who presents to the clinic complaining of painful ingrown toenail on both feet. B/L great toe B/L border    Review of Systems   Constitutional: Negative for chills.   Cardiovascular:  Negative for chest pain and claudication.   Respiratory:  Negative for cough.    Skin:  Positive for color change, dry skin and nail changes.   Musculoskeletal:  Positive for joint pain.   Gastrointestinal:  Negative for nausea.   Neurological:  Positive for paresthesias. Negative for numbness.   Psychiatric/Behavioral:  The patient is not nervous/anxious.          Objective:      Physical Exam  Constitutional:       Appearance: She is well-developed.      Comments: Oriented to time, place, and person.   Cardiovascular:      Comments: DP and PT pulses are palpable bilaterally. 3 sec capillary refill time and toes and feet are warm to touch proximally .  There is  hair growth on the feet and toes b/l. There is no edema b/l. No spider veins or varicosities present b/l.     Musculoskeletal:      Comments: Equinus noted b/l ankles with < 10 deg DF noted. MMT 5/5 in DF/PF/Inv/Ev resistance with no reproduction of pain in any direction. Passive range of motion of ankle and pedal joints is painless b/l.    Pain on palpation left lateral foot pain 5th metatarsal base.    Feet:      Right foot:      Skin integrity: No callus or dry skin.      Left foot:      Skin integrity: No callus or dry skin.   Lymphadenopathy:      Comments: Negative lymphadenopathy bilateral popliteal fossa and tarsal tunnel.   Skin:     Comments: No open lesions, lacerations or wounds noted.Interdigital spaces clean, dry and intact b/l. No erythema noted to b/l foot.    B/L hallux nail margin of both feet with ingrown nail plate. No purulent drainage noted.     Toenails 1-5 bilaterally are elongated by 2-3 mm,  thickened by 2-3 mm, discolored/yellowed, dystrophic, brittle with subungual debris.         Neurological:      Mental Status: She is alert.      Comments: Light touch, proprioception, and sharp/dull sensation are all intact bilaterally. Protective threshold with the Hebron-Wienstein monofilament is intact bilaterally.    Psychiatric:         Behavior: Behavior is cooperative.             Assessment:       Encounter Diagnoses   Name Primary?    Ingrown toenail Yes    Left foot pain          Plan:       Riley was seen today for ingrown toenail.    Diagnoses and all orders for this visit:    Ingrown toenail  -     Ambulatory referral/consult to Podiatry  -     X-Ray Foot Complete Left; Future    Left foot pain  -     X-Ray Foot Complete Left; Future    Other orders  -     ciclopirox (PENLAC) 8 % Soln; Apply topically nightly.    I counseled the patient on her conditions, their implications and medical management.      Discussed treatment options with patient. Options included soaking, avulsion and matrixectomy. Risks and benefits discussed and all questions were answered. The patient wishes to proceed with  Nail avulsion at a later date      In depth conversation on the treatment of ingrown nail; partial nail avulsion vs chemical matrixectomy vs conservative treatment of soaking and nail trimming    Utilizing sterile toenail clippers I aggressively trimmed  the offending B/L hallux B/L   nail border approximately 3 mm from its edge and carried the nail plate incision down at an angle in order to wedge out the offending cryptotic portion of the nail plate. The offending border was then removed in toto. The remaining nail was grinded down with an electric  down to nail bed.  No blood was drawn. Patient tolerated the procedure well and related significant relief.  At patient's request, I discussed different treatments for toenail fungus. We discussed oral antifungals but I did not recommend them as a first  line treatment since the medication is taken internally and can have side effects such as rash, taste disturbances, and liver enzyme elevation. We discussed topical Penlac to be applied daily and removed weekly. Pt. Expresses understanding and would like to try the Penlac. Rx sent to the pharmacy.     Left foot xray to assess underlying deformity and for underlying osseous pathology.     Discussed conservative treatment with shoes of adequate dimensions, material, and style to alleviate symptoms and delay or prevent surgical intervention.    RTC PRN

## 2022-12-21 DIAGNOSIS — N18.31 STAGE 3A CHRONIC KIDNEY DISEASE: ICD-10-CM

## 2022-12-29 ENCOUNTER — PES CALL (OUTPATIENT)
Dept: ADMINISTRATIVE | Facility: OTHER | Age: 67
End: 2022-12-29
Payer: MEDICARE

## 2023-02-06 ENCOUNTER — PES CALL (OUTPATIENT)
Dept: ADMINISTRATIVE | Facility: CLINIC | Age: 68
End: 2023-02-06
Payer: MEDICARE

## 2023-02-06 DIAGNOSIS — K21.9 GASTROESOPHAGEAL REFLUX DISEASE, UNSPECIFIED WHETHER ESOPHAGITIS PRESENT: ICD-10-CM

## 2023-02-06 RX ORDER — PANTOPRAZOLE SODIUM 40 MG/1
40 TABLET, DELAYED RELEASE ORAL DAILY
Qty: 90 TABLET | Refills: 5 | Status: CANCELLED | OUTPATIENT
Start: 2023-02-06 | End: 2024-02-06

## 2023-02-07 ENCOUNTER — LAB VISIT (OUTPATIENT)
Dept: LAB | Facility: HOSPITAL | Age: 68
End: 2023-02-07
Attending: INTERNAL MEDICINE
Payer: MEDICARE

## 2023-02-07 ENCOUNTER — IMMUNIZATION (OUTPATIENT)
Dept: PHARMACY | Facility: CLINIC | Age: 68
End: 2023-02-07
Payer: MEDICARE

## 2023-02-07 DIAGNOSIS — R53.83 FATIGUE, UNSPECIFIED TYPE: ICD-10-CM

## 2023-02-07 DIAGNOSIS — Z00.00 ANNUAL PHYSICAL EXAM: ICD-10-CM

## 2023-02-07 LAB
ALBUMIN SERPL BCP-MCNC: 3.7 G/DL (ref 3.5–5.2)
ALP SERPL-CCNC: 61 U/L (ref 55–135)
ALT SERPL W/O P-5'-P-CCNC: 19 U/L (ref 10–44)
ANION GAP SERPL CALC-SCNC: 9 MMOL/L (ref 8–16)
AST SERPL-CCNC: 19 U/L (ref 10–40)
BACTERIA #/AREA URNS HPF: NORMAL /HPF
BASOPHILS # BLD AUTO: 0.03 K/UL (ref 0–0.2)
BASOPHILS NFR BLD: 0.6 % (ref 0–1.9)
BILIRUB SERPL-MCNC: 0.5 MG/DL (ref 0.1–1)
BILIRUB UR QL STRIP: NEGATIVE
BUN SERPL-MCNC: 21 MG/DL (ref 8–23)
CALCIUM SERPL-MCNC: 10.3 MG/DL (ref 8.7–10.5)
CHLORIDE SERPL-SCNC: 106 MMOL/L (ref 95–110)
CHOLEST SERPL-MCNC: 218 MG/DL (ref 120–199)
CHOLEST/HDLC SERPL: 3.5 {RATIO} (ref 2–5)
CLARITY UR: CLEAR
CO2 SERPL-SCNC: 30 MMOL/L (ref 23–29)
COLOR UR: YELLOW
CREAT SERPL-MCNC: 1.4 MG/DL (ref 0.5–1.4)
DIFFERENTIAL METHOD: ABNORMAL
EOSINOPHIL # BLD AUTO: 0.1 K/UL (ref 0–0.5)
EOSINOPHIL NFR BLD: 2.6 % (ref 0–8)
ERYTHROCYTE [DISTWIDTH] IN BLOOD BY AUTOMATED COUNT: 12.6 % (ref 11.5–14.5)
EST. GFR  (NO RACE VARIABLE): 41 ML/MIN/1.73 M^2
GLUCOSE SERPL-MCNC: 111 MG/DL (ref 70–110)
GLUCOSE UR QL STRIP: NEGATIVE
HCT VFR BLD AUTO: 48.2 % (ref 37–48.5)
HDLC SERPL-MCNC: 63 MG/DL (ref 40–75)
HDLC SERPL: 28.9 % (ref 20–50)
HGB BLD-MCNC: 15.1 G/DL (ref 12–16)
HGB UR QL STRIP: NEGATIVE
HYALINE CASTS #/AREA URNS LPF: NORMAL /LPF
IMM GRANULOCYTES # BLD AUTO: 0.01 K/UL (ref 0–0.04)
IMM GRANULOCYTES NFR BLD AUTO: 0.2 % (ref 0–0.5)
KETONES UR QL STRIP: NEGATIVE
LDLC SERPL CALC-MCNC: 132 MG/DL (ref 63–159)
LEUKOCYTE ESTERASE UR QL STRIP: NEGATIVE
LYMPHOCYTES # BLD AUTO: 1.3 K/UL (ref 1–4.8)
LYMPHOCYTES NFR BLD: 23.8 % (ref 18–48)
MCH RBC QN AUTO: 29.2 PG (ref 27–31)
MCHC RBC AUTO-ENTMCNC: 31.3 G/DL (ref 32–36)
MCV RBC AUTO: 93 FL (ref 82–98)
MICROSCOPIC COMMENT: NORMAL
MONOCYTES # BLD AUTO: 0.4 K/UL (ref 0.3–1)
MONOCYTES NFR BLD: 7 % (ref 4–15)
NEUTROPHILS # BLD AUTO: 3.5 K/UL (ref 1.8–7.7)
NEUTROPHILS NFR BLD: 65.8 % (ref 38–73)
NITRITE UR QL STRIP: NEGATIVE
NONHDLC SERPL-MCNC: 155 MG/DL
NRBC BLD-RTO: 0 /100 WBC
PH UR STRIP: 6 [PH] (ref 5–8)
PLATELET # BLD AUTO: 202 K/UL (ref 150–450)
PMV BLD AUTO: 12.5 FL (ref 9.2–12.9)
POTASSIUM SERPL-SCNC: 4.7 MMOL/L (ref 3.5–5.1)
PROT SERPL-MCNC: 8.1 G/DL (ref 6–8.4)
PROT UR QL STRIP: ABNORMAL
RBC # BLD AUTO: 5.18 M/UL (ref 4–5.4)
RBC #/AREA URNS HPF: 0 /HPF (ref 0–4)
SODIUM SERPL-SCNC: 145 MMOL/L (ref 136–145)
SP GR UR STRIP: 1.02 (ref 1–1.03)
TRIGL SERPL-MCNC: 115 MG/DL (ref 30–150)
TSH SERPL DL<=0.005 MIU/L-ACNC: 1.86 UIU/ML (ref 0.4–4)
URATE SERPL-MCNC: 7.5 MG/DL (ref 2.4–5.7)
URN SPEC COLLECT METH UR: ABNORMAL
UROBILINOGEN UR STRIP-ACNC: NEGATIVE EU/DL
WBC # BLD AUTO: 5.29 K/UL (ref 3.9–12.7)
WBC #/AREA URNS HPF: 0 /HPF (ref 0–5)

## 2023-02-07 PROCEDURE — 80061 LIPID PANEL: CPT | Performed by: INTERNAL MEDICINE

## 2023-02-07 PROCEDURE — 84443 ASSAY THYROID STIM HORMONE: CPT | Performed by: INTERNAL MEDICINE

## 2023-02-07 PROCEDURE — 83036 HEMOGLOBIN GLYCOSYLATED A1C: CPT | Performed by: INTERNAL MEDICINE

## 2023-02-07 PROCEDURE — 81000 URINALYSIS NONAUTO W/SCOPE: CPT | Performed by: INTERNAL MEDICINE

## 2023-02-07 PROCEDURE — 84550 ASSAY OF BLOOD/URIC ACID: CPT | Performed by: INTERNAL MEDICINE

## 2023-02-07 PROCEDURE — 82746 ASSAY OF FOLIC ACID SERUM: CPT | Performed by: INTERNAL MEDICINE

## 2023-02-07 PROCEDURE — 80053 COMPREHEN METABOLIC PANEL: CPT | Performed by: INTERNAL MEDICINE

## 2023-02-07 PROCEDURE — 85025 COMPLETE CBC W/AUTO DIFF WBC: CPT | Performed by: INTERNAL MEDICINE

## 2023-02-07 PROCEDURE — 82607 VITAMIN B-12: CPT | Performed by: INTERNAL MEDICINE

## 2023-02-08 LAB
ESTIMATED AVG GLUCOSE: 126 MG/DL (ref 68–131)
FOLATE SERPL-MCNC: 15.5 NG/ML (ref 4–24)
HBA1C MFR BLD: 6 % (ref 4–5.6)
VIT B12 SERPL-MCNC: 1235 PG/ML (ref 210–950)

## 2023-03-07 DIAGNOSIS — I10 PRIMARY HYPERTENSION: ICD-10-CM

## 2023-03-07 RX ORDER — AMLODIPINE BESYLATE 5 MG/1
5 TABLET ORAL DAILY
Qty: 90 TABLET | Refills: 3 | Status: SHIPPED | OUTPATIENT
Start: 2023-03-07 | End: 2024-03-21

## 2023-03-08 ENCOUNTER — PES CALL (OUTPATIENT)
Dept: ADMINISTRATIVE | Facility: CLINIC | Age: 68
End: 2023-03-08
Payer: MEDICARE

## 2023-03-20 ENCOUNTER — OFFICE VISIT (OUTPATIENT)
Dept: URGENT CARE | Facility: CLINIC | Age: 68
End: 2023-03-20
Payer: MEDICARE

## 2023-03-20 VITALS
DIASTOLIC BLOOD PRESSURE: 99 MMHG | SYSTOLIC BLOOD PRESSURE: 168 MMHG | HEART RATE: 100 BPM | WEIGHT: 176 LBS | HEIGHT: 62 IN | OXYGEN SATURATION: 98 % | RESPIRATION RATE: 18 BRPM | BODY MASS INDEX: 32.39 KG/M2 | TEMPERATURE: 99 F

## 2023-03-20 DIAGNOSIS — R30.0 DYSURIA: ICD-10-CM

## 2023-03-20 DIAGNOSIS — R11.2 NAUSEA AND VOMITING, UNSPECIFIED VOMITING TYPE: ICD-10-CM

## 2023-03-20 DIAGNOSIS — R10.9 ABDOMINAL CRAMPING: ICD-10-CM

## 2023-03-20 DIAGNOSIS — K52.9 ACUTE GASTROENTERITIS: Primary | ICD-10-CM

## 2023-03-20 LAB
BILIRUB UR QL STRIP: NEGATIVE
CTP QC/QA: YES
GLUCOSE UR QL STRIP: NEGATIVE
KETONES UR QL STRIP: POSITIVE
LEUKOCYTE ESTERASE UR QL STRIP: NEGATIVE
PH, POC UA: 5
POC BLOOD, URINE: NEGATIVE
POC MOLECULAR INFLUENZA A AGN: NEGATIVE
POC MOLECULAR INFLUENZA B AGN: NEGATIVE
POC NITRATES, URINE: NEGATIVE
PROT UR QL STRIP: NEGATIVE
SP GR UR STRIP: 1.02 (ref 1–1.03)
UROBILINOGEN UR STRIP-ACNC: ABNORMAL (ref 0.1–1.1)

## 2023-03-20 PROCEDURE — 99213 OFFICE O/P EST LOW 20 MIN: CPT | Mod: S$GLB,,,

## 2023-03-20 PROCEDURE — 81003 URINALYSIS AUTO W/O SCOPE: CPT | Mod: QW,S$GLB,,

## 2023-03-20 PROCEDURE — 99213 PR OFFICE/OUTPT VISIT, EST, LEVL III, 20-29 MIN: ICD-10-PCS | Mod: S$GLB,,,

## 2023-03-20 PROCEDURE — 81003 POCT URINALYSIS, DIPSTICK, AUTOMATED, W/O SCOPE: ICD-10-PCS | Mod: QW,S$GLB,,

## 2023-03-20 PROCEDURE — 87502 POCT INFLUENZA A/B MOLECULAR: ICD-10-PCS | Mod: QW,S$GLB,,

## 2023-03-20 PROCEDURE — 87502 INFLUENZA DNA AMP PROBE: CPT | Mod: QW,S$GLB,,

## 2023-03-20 RX ORDER — DICYCLOMINE HYDROCHLORIDE 10 MG/1
10 CAPSULE ORAL 4 TIMES DAILY
Qty: 20 CAPSULE | Refills: 0 | Status: SHIPPED | OUTPATIENT
Start: 2023-03-20 | End: 2023-11-21

## 2023-03-20 RX ORDER — ONDANSETRON 4 MG/1
4 TABLET, ORALLY DISINTEGRATING ORAL EVERY 8 HOURS PRN
Qty: 15 TABLET | Refills: 0 | Status: SHIPPED | OUTPATIENT
Start: 2023-03-20 | End: 2023-05-08

## 2023-03-20 NOTE — PATIENT INSTRUCTIONS
- Rest.    - Drink plenty of fluids.  - Pedialyte, Gatorade/powerade, water     - Tylenol or Ibuprofen as directed as needed for fever/pain.      - Take zofran (ondansetron) 4-8 mg every 8 hours as needed, as prescribed for nausea     - can take over-the-counter Pepto-Bismol to help with diarrhea.     -Dicyclomine is an anti-spasmodic that helps with stomach pain/cramps associated with diarrhea. It is to be taken only as needed.      - Follow up with your PCP or specialty clinic as directed in the next 2-3 days if not improved or as needed.  You can call [phone] to schedule an appointment with the appropriate provider.    - Go to the ER if you develop any new or worsening symptoms     - You must understand that you have received an Urgent Care treatment only and that you may be released before all of your medical problems are known or treated.   - You, the patient, will arrange for follow up care as instructed.   - If your condition worsens or fails to improve we recommend that you receive another evaluation at the ER immediately or contact your PCP to discuss your concerns or return here.

## 2023-03-20 NOTE — PROGRESS NOTES
"Subjective:       Patient ID: Riley Marcus is a 67 y.o. female.    Vitals:  height is 5' 2" (1.575 m) and weight is 79.8 kg (176 lb). Her tympanic temperature is 98.9 °F (37.2 °C). Her blood pressure is 168/99 (abnormal) and her pulse is 100. Her respiration is 18 and oxygen saturation is 98%.     Chief Complaint: Emesis    Pt is a 68 y/o F who presents with nausea, vomiting, abdominal cramping, and diarrhea that started 1 week ago. Had NBNB emesis in the first today days, but no further episodes. States that starting 4-5 days ago, she began having more formed stools. Still getting intermittent abdominal cramping. She had fever on the first today, reports Tmax 102. Also states that she started having dysuria yesterday. Ate soup yesterday. Able to hold down water and tea. No recent travel, or antibiotic use. No sick contacts. She had a negative at home COVID test. Denies chills, CP, SoB, URI sxs, urinary urgency or frequency, pelvic pain, back or flank pain.    Emesis   This is a new problem. The current episode started in the past 7 days. The problem occurs less than 2 times per day. The problem has been gradually improving. The emesis has an appearance of stomach contents. The maximum temperature recorded prior to her arrival was 102 - 102.9 F. The fever has been present for Less than 1 day. Associated symptoms include abdominal pain, diarrhea and a fever. Pertinent negatives include no arthralgias, chest pain, chills, coughing, decreased urine volume, dizziness, headaches, myalgias, sweats, URI or weight loss. She has tried anti-emetic for the symptoms. The treatment provided mild relief.     Constitution: Positive for fever. Negative for chills.   HENT:  Negative for ear pain, congestion and sore throat.    Neck: Negative for neck pain and neck stiffness.   Cardiovascular:  Negative for chest pain.   Respiratory:  Negative for chest tightness, cough and shortness of breath.    Gastrointestinal:  Positive for " abdominal pain, nausea, vomiting and diarrhea. Negative for constipation, bright red blood in stool and dark colored stools.   Genitourinary:  Positive for dysuria. Negative for frequency, urgency, urine decreased, flank pain, hematuria and pelvic pain.   Musculoskeletal:  Negative for joint pain and muscle ache.   Skin:  Negative for rash.   Neurological:  Negative for dizziness and headaches.     Objective:      Physical Exam   Constitutional: She is oriented to person, place, and time. She appears well-developed.   HENT:   Head: Normocephalic and atraumatic.   Ears:   Right Ear: External ear normal.   Left Ear: External ear normal.   Nose: Nose normal.   Mouth/Throat: Oropharynx is clear and moist. Mucous membranes are moist. Oropharynx is clear.   Eyes: Conjunctivae, EOM and lids are normal. Pupils are equal, round, and reactive to light.   Neck: Trachea normal and phonation normal. Neck supple.   Cardiovascular: Normal rate, regular rhythm, normal heart sounds and normal pulses.   Pulmonary/Chest: Effort normal and breath sounds normal. No respiratory distress.   Abdominal: Bowel sounds are normal. She exhibits no distension. Soft. There is no abdominal tenderness. There is no rebound and no guarding.   Musculoskeletal: Normal range of motion.         General: Normal range of motion.   Neurological: no focal deficit. She is alert and oriented to person, place, and time.   Skin: Skin is warm, dry and intact. Capillary refill takes less than 2 seconds.   Psychiatric: Her speech is normal and behavior is normal. Judgment and thought content normal.   Nursing note and vitals reviewed.        Results for orders placed or performed in visit on 03/20/23   POCT Influenza A/B MOLECULAR   Result Value Ref Range    POC Molecular Influenza A Ag Negative Negative, Not Reported    POC Molecular Influenza B Ag Negative Negative, Not Reported     Acceptable Yes    POCT Urinalysis, Dipstick, Automated, W/O Scope    Result Value Ref Range    POC Blood, Urine Negative Negative    POC Bilirubin, Urine Negative Negative    POC Urobilinogen, Urine norm 0.1 - 1.1    POC Ketones, Urine Positive (A) Negative    POC Protein, Urine Negative Negative    POC Nitrates, Urine Negative Negative    POC Glucose, Urine Negative Negative    pH, UA 5.0     POC Specific Gravity, Urine 1.025 1.003 - 1.029    POC Leukocytes, Urine Negative Negative       Assessment:       1. Acute gastroenteritis    2. Nausea and vomiting, unspecified vomiting type    3. Abdominal cramping    4. Dysuria            Plan:         Acute gastroenteritis    Nausea and vomiting, unspecified vomiting type  -     POCT Influenza A/B MOLECULAR  -     ondansetron (ZOFRAN-ODT) 4 MG TbDL; Dissolve 1 tablet (4 mg total) by mouth every 8 (eight) hours as needed (nausea).  Dispense: 15 tablet; Refill: 0    Abdominal cramping  -     dicyclomine (BENTYL) 10 MG capsule; Take 1 capsule (10 mg total) by mouth 4 (four) times daily.  Dispense: 20 capsule; Refill: 0    Dysuria  -     POCT Urinalysis, Dipstick, Automated, W/O Scope         Patient is a 67-year-old female who presents with nausea, vomiting, diarrhea, abdominal cramping that started 1 week ago.  Symptoms are slowly improving.  States that she started experiencing dysuria yesterday.  On exam, patient nontoxic and afebrile.  Soft nontender abdomen without rebound or guarding.  POCT flu negative.  Suspecting gastroenteritis.  Zofran for nausea and Bentyl for abdominal cramping.  UA without signs of infection.  There are ketones, suspect that this is due to vomiting and dehydration.  Advised patient on importance of hydration.  ED precautions discussed.  Patient verbalizes understanding agrees with plan.          Patient Instructions   - Rest.    - Drink plenty of fluids.  - Pedialyte, Gatorade/powerade, water     - Tylenol or Ibuprofen as directed as needed for fever/pain.      - Take zofran (ondansetron) 4-8 mg every 8 hours  as needed, as prescribed for nausea     - can take over-the-counter Pepto-Bismol to help with diarrhea.     -Dicyclomine is an anti-spasmodic that helps with stomach pain/cramps associated with diarrhea. It is to be taken only as needed.      - Follow up with your PCP or specialty clinic as directed in the next 2-3 days if not improved or as needed.  You can call [phone] to schedule an appointment with the appropriate provider.    - Go to the ER if you develop any new or worsening symptoms     - You must understand that you have received an Urgent Care treatment only and that you may be released before all of your medical problems are known or treated.   - You, the patient, will arrange for follow up care as instructed.   - If your condition worsens or fails to improve we recommend that you receive another evaluation at the ER immediately or contact your PCP to discuss your concerns or return here.

## 2023-05-08 ENCOUNTER — OFFICE VISIT (OUTPATIENT)
Dept: FAMILY MEDICINE | Facility: CLINIC | Age: 68
End: 2023-05-08
Payer: MEDICARE

## 2023-05-08 VITALS
SYSTOLIC BLOOD PRESSURE: 144 MMHG | WEIGHT: 174.94 LBS | TEMPERATURE: 98 F | BODY MASS INDEX: 32.19 KG/M2 | HEIGHT: 62 IN | DIASTOLIC BLOOD PRESSURE: 78 MMHG | HEART RATE: 67 BPM | OXYGEN SATURATION: 99 %

## 2023-05-08 DIAGNOSIS — E66.09 CLASS 1 OBESITY DUE TO EXCESS CALORIES WITH SERIOUS COMORBIDITY AND BODY MASS INDEX (BMI) OF 32.0 TO 32.9 IN ADULT: ICD-10-CM

## 2023-05-08 DIAGNOSIS — K21.9 GASTROESOPHAGEAL REFLUX DISEASE, UNSPECIFIED WHETHER ESOPHAGITIS PRESENT: ICD-10-CM

## 2023-05-08 DIAGNOSIS — N18.32 STAGE 3B CHRONIC KIDNEY DISEASE: ICD-10-CM

## 2023-05-08 DIAGNOSIS — R73.03 PREDIABETES: ICD-10-CM

## 2023-05-08 DIAGNOSIS — E78.00 PURE HYPERCHOLESTEROLEMIA: ICD-10-CM

## 2023-05-08 DIAGNOSIS — M1A.00X0 IDIOPATHIC CHRONIC GOUT WITHOUT TOPHUS, UNSPECIFIED SITE: ICD-10-CM

## 2023-05-08 DIAGNOSIS — Z00.00 ENCOUNTER FOR PREVENTIVE HEALTH EXAMINATION: Primary | ICD-10-CM

## 2023-05-08 DIAGNOSIS — I10 HYPERTENSION, UNSPECIFIED TYPE: ICD-10-CM

## 2023-05-08 PROBLEM — E66.811 CLASS 1 OBESITY DUE TO EXCESS CALORIES WITH SERIOUS COMORBIDITY IN ADULT: Status: ACTIVE | Noted: 2023-05-08

## 2023-05-08 PROCEDURE — 3078F PR MOST RECENT DIASTOLIC BLOOD PRESSURE < 80 MM HG: ICD-10-PCS | Mod: CPTII,S$GLB,, | Performed by: NURSE PRACTITIONER

## 2023-05-08 PROCEDURE — 3078F DIAST BP <80 MM HG: CPT | Mod: CPTII,S$GLB,, | Performed by: NURSE PRACTITIONER

## 2023-05-08 PROCEDURE — 1159F MED LIST DOCD IN RCRD: CPT | Mod: CPTII,S$GLB,, | Performed by: NURSE PRACTITIONER

## 2023-05-08 PROCEDURE — 1126F AMNT PAIN NOTED NONE PRSNT: CPT | Mod: CPTII,S$GLB,, | Performed by: NURSE PRACTITIONER

## 2023-05-08 PROCEDURE — 1159F PR MEDICATION LIST DOCUMENTED IN MEDICAL RECORD: ICD-10-PCS | Mod: CPTII,S$GLB,, | Performed by: NURSE PRACTITIONER

## 2023-05-08 PROCEDURE — 1160F RVW MEDS BY RX/DR IN RCRD: CPT | Mod: CPTII,S$GLB,, | Performed by: NURSE PRACTITIONER

## 2023-05-08 PROCEDURE — 1101F PT FALLS ASSESS-DOCD LE1/YR: CPT | Mod: CPTII,S$GLB,, | Performed by: NURSE PRACTITIONER

## 2023-05-08 PROCEDURE — 3044F HG A1C LEVEL LT 7.0%: CPT | Mod: CPTII,S$GLB,, | Performed by: NURSE PRACTITIONER

## 2023-05-08 PROCEDURE — 3077F SYST BP >= 140 MM HG: CPT | Mod: CPTII,S$GLB,, | Performed by: NURSE PRACTITIONER

## 2023-05-08 PROCEDURE — 1170F PR FUNCTIONAL STATUS ASSESSED: ICD-10-PCS | Mod: CPTII,S$GLB,, | Performed by: NURSE PRACTITIONER

## 2023-05-08 PROCEDURE — 3077F PR MOST RECENT SYSTOLIC BLOOD PRESSURE >= 140 MM HG: ICD-10-PCS | Mod: CPTII,S$GLB,, | Performed by: NURSE PRACTITIONER

## 2023-05-08 PROCEDURE — 1170F FXNL STATUS ASSESSED: CPT | Mod: CPTII,S$GLB,, | Performed by: NURSE PRACTITIONER

## 2023-05-08 PROCEDURE — G0439 PR MEDICARE ANNUAL WELLNESS SUBSEQUENT VISIT: ICD-10-PCS | Mod: S$GLB,,, | Performed by: NURSE PRACTITIONER

## 2023-05-08 PROCEDURE — G0439 PPPS, SUBSEQ VISIT: HCPCS | Mod: S$GLB,,, | Performed by: NURSE PRACTITIONER

## 2023-05-08 PROCEDURE — 4010F PR ACE/ARB THEARPY RXD/TAKEN: ICD-10-PCS | Mod: CPTII,S$GLB,, | Performed by: NURSE PRACTITIONER

## 2023-05-08 PROCEDURE — 3008F BODY MASS INDEX DOCD: CPT | Mod: CPTII,S$GLB,, | Performed by: NURSE PRACTITIONER

## 2023-05-08 PROCEDURE — 99999 PR PBB SHADOW E&M-EST. PATIENT-LVL V: ICD-10-PCS | Mod: PBBFAC,,, | Performed by: NURSE PRACTITIONER

## 2023-05-08 PROCEDURE — 1160F PR REVIEW ALL MEDS BY PRESCRIBER/CLIN PHARMACIST DOCUMENTED: ICD-10-PCS | Mod: CPTII,S$GLB,, | Performed by: NURSE PRACTITIONER

## 2023-05-08 PROCEDURE — 3288F FALL RISK ASSESSMENT DOCD: CPT | Mod: CPTII,S$GLB,, | Performed by: NURSE PRACTITIONER

## 2023-05-08 PROCEDURE — 99999 PR PBB SHADOW E&M-EST. PATIENT-LVL V: CPT | Mod: PBBFAC,,, | Performed by: NURSE PRACTITIONER

## 2023-05-08 PROCEDURE — 1126F PR PAIN SEVERITY QUANTIFIED, NO PAIN PRESENT: ICD-10-PCS | Mod: CPTII,S$GLB,, | Performed by: NURSE PRACTITIONER

## 2023-05-08 PROCEDURE — 3044F PR MOST RECENT HEMOGLOBIN A1C LEVEL <7.0%: ICD-10-PCS | Mod: CPTII,S$GLB,, | Performed by: NURSE PRACTITIONER

## 2023-05-08 PROCEDURE — 3008F PR BODY MASS INDEX (BMI) DOCUMENTED: ICD-10-PCS | Mod: CPTII,S$GLB,, | Performed by: NURSE PRACTITIONER

## 2023-05-08 PROCEDURE — 1101F PR PT FALLS ASSESS DOC 0-1 FALLS W/OUT INJ PAST YR: ICD-10-PCS | Mod: CPTII,S$GLB,, | Performed by: NURSE PRACTITIONER

## 2023-05-08 PROCEDURE — 3288F PR FALLS RISK ASSESSMENT DOCUMENTED: ICD-10-PCS | Mod: CPTII,S$GLB,, | Performed by: NURSE PRACTITIONER

## 2023-05-08 PROCEDURE — 4010F ACE/ARB THERAPY RXD/TAKEN: CPT | Mod: CPTII,S$GLB,, | Performed by: NURSE PRACTITIONER

## 2023-05-08 NOTE — PROGRESS NOTES
"Riley Marcus presented for a  Medicare AWV and comprehensive Health Risk Assessment today. The following components were reviewed and updated:    Medical history  Family History  Social history  Allergies and Current Medications  Health Risk Assessment  Health Maintenance  Care Team       ** See Completed Assessments for Annual Wellness Visit within the encounter summary.**       The following assessments were completed:  Living Situation  CAGE  Depression Screening  Timed Get Up and Go  Whisper Test  Cognitive Function Screening  Nutrition Screening  ADL Screening  PAQ Screening            Vitals:    05/08/23 0906   BP: (!) 144/78   BP Location: Right arm   Patient Position: Sitting   BP Method: Large (Manual)   Pulse: 67   Temp: 98.3 °F (36.8 °C)   TempSrc: Oral   SpO2: 99%   Weight: 79.3 kg (174 lb 15 oz)   Height: 5' 2" (1.575 m)     Body mass index is 32 kg/m².  Physical Exam  Vitals and nursing note reviewed.   Constitutional:       Appearance: Normal appearance.   Cardiovascular:      Rate and Rhythm: Normal rate.      Pulses: Normal pulses.      Heart sounds: Normal heart sounds.   Pulmonary:      Effort: Pulmonary effort is normal.      Breath sounds: Normal breath sounds.   Musculoskeletal:         General: Normal range of motion.   Neurological:      Mental Status: She is alert and oriented to person, place, and time.   Psychiatric:         Mood and Affect: Mood normal.         Behavior: Behavior normal.             Diagnoses and health risks identified today and associated recommendations/orders:    1. Encounter for preventive health examination  Pt was seen today for an Annual Wellness visit. Healthcare maintenance and screening recommendations were discussed and updated as indicated. Return in one year for AWV.    Review current opioid prescriptions:n/a  Screen for potential Substance Use Disorders:n/a    2. Stage 3b chronic kidney disease  The current medical regimen is effective;  continue " present plan and medications.    3. Hypertension, unspecified type  Not at goal. Discussed b/p goal, diet with incresed physical activity as tolerated. Understanding verbalized. The current medical regimen is effective;  continue present plan and medications.    4. Pure hypercholesterolemia  The current medical regimen is effective;  continue present plan and medications.    5. Prediabetes  The current medical regimen is effective;  continue present plan and medications.  Hemoglobin A1C   Date Value Ref Range Status   02/07/2023 6.0 (H) 4.0 - 5.6 % Final             12/14/2021 6.0 (H) 4.0 - 5.6 % Final               6. Gastroesophageal reflux disease, unspecified whether esophagitis present  The current medical regimen is effective;  continue present plan and medications.    7. Idiopathic chronic gout without tophus, unspecified site  The current medical regimen is effective;  continue present plan and medications.    8. Class 1 obesity due to excess calories with serious comorbidity and body mass index (BMI) of 32.0 to 32.9 in adult  The patient is asked to make an attempt to improve diet and exercise patterns to aid in medical management of this problem.        [facility] a 5-10 year written screening schedule and personal prevention plan. Recommendations were developed using the USPSTF age appropriate recommendations. Education, counseling, and referrals were provided as needed. After Visit Summary printed and given to patient which includes a list of additional screenings\tests needed.    Follow up in about 1 year (around 5/8/2024).    FELI Gaytan  I offered to discuss advanced care planning, including how to pick a person who would make decisions for you if you were unable to make them for yourself, called a health care power of , and what kind of decisions you might make such as use of life sustaining treatments such as ventilators and tube feeding when faced with a life limiting  illness recorded on a living will that they will need to know. (How you want to be cared for as you near the end of your natural life)     X Patient is interested in learning more about how to make advanced directives.  I provided them paperwork and offered to discuss this with them.

## 2023-05-08 NOTE — PATIENT INSTRUCTIONS
Counseling and Referral of Other Preventative  (Italic type indicates deductible and co-insurance are waived)    Patient Name: Riley Marcus  Today's Date: 5/8/2023    Health Maintenance       Date Due Completion Date    Mammogram 02/14/2023 2/14/2022    Hemoglobin A1c (Prediabetes) 02/07/2024 2/7/2023    DEXA Scan 02/14/2027 2/14/2022    TETANUS VACCINE 11/30/2027 11/30/2017    Lipid Panel 02/07/2028 2/7/2023    Colorectal Cancer Screening 07/20/2032 7/20/2022        No orders of the defined types were placed in this encounter.    The following information is provided to all patients.  This information is to help you find resources for any of the problems found today that may be affecting your health:                Living healthy guide: www.[facility].[place].[facility]      Understanding Diabetes: www.diabetes.org      Eating healthy: www.cdc.gov/healthyweight      [facility] home safety checklist: www.cdc.gov/steadi/patient.html      Agency on Aging: www.goea.[place]      Alcoholics anonymous (AA): www.aa.org      Physical Activity: www.[place].nih.gov/te6datg      Tobacco use: www.quitwithusla.org

## 2023-05-18 ENCOUNTER — PATIENT MESSAGE (OUTPATIENT)
Dept: FAMILY MEDICINE | Facility: CLINIC | Age: 68
End: 2023-05-18
Payer: MEDICARE

## 2023-05-18 VITALS — DIASTOLIC BLOOD PRESSURE: 87 MMHG | SYSTOLIC BLOOD PRESSURE: 141 MMHG

## 2023-05-18 DIAGNOSIS — I10 PRIMARY HYPERTENSION: Primary | ICD-10-CM

## 2023-05-18 RX ORDER — HYDRALAZINE HYDROCHLORIDE 25 MG/1
25 TABLET, FILM COATED ORAL EVERY 12 HOURS
Qty: 60 TABLET | Refills: 0 | Status: SHIPPED | OUTPATIENT
Start: 2023-05-18 | End: 2023-06-28 | Stop reason: SDUPTHER

## 2023-06-14 DIAGNOSIS — I10 PRIMARY HYPERTENSION: ICD-10-CM

## 2023-06-14 RX ORDER — HYDRALAZINE HYDROCHLORIDE 25 MG/1
25 TABLET, FILM COATED ORAL EVERY 12 HOURS
Qty: 60 TABLET | Refills: 0 | OUTPATIENT
Start: 2023-06-14 | End: 2024-06-13

## 2023-06-28 ENCOUNTER — PATIENT MESSAGE (OUTPATIENT)
Dept: FAMILY MEDICINE | Facility: CLINIC | Age: 68
End: 2023-06-28
Payer: MEDICARE

## 2023-06-28 VITALS — SYSTOLIC BLOOD PRESSURE: 138 MMHG | DIASTOLIC BLOOD PRESSURE: 86 MMHG

## 2023-06-28 DIAGNOSIS — I10 PRIMARY HYPERTENSION: ICD-10-CM

## 2023-06-28 RX ORDER — HYDRALAZINE HYDROCHLORIDE 25 MG/1
25 TABLET, FILM COATED ORAL EVERY 12 HOURS
Qty: 180 TABLET | Refills: 1 | Status: SHIPPED | OUTPATIENT
Start: 2023-06-28 | End: 2024-01-17 | Stop reason: SDUPTHER

## 2023-06-28 NOTE — TELEPHONE ENCOUNTER
Last Office Visit Info:   The patient's last visit with Olga Duncan MD was on 2/7/2023.    The patient's last visit in current department was on Visit date not found.        Last CBC Results:   Lab Results   Component Value Date    WBC 5.29 02/07/2023    HGB 15.1 02/07/2023    HCT 48.2 02/07/2023     02/07/2023       Last CMP Results  Lab Results   Component Value Date     02/07/2023    K 4.7 02/07/2023     02/07/2023    CO2 30 (H) 02/07/2023    BUN 21 02/07/2023    CREATININE 1.4 02/07/2023    CALCIUM 10.3 02/07/2023    ALBUMIN 3.7 02/07/2023    AST 19 02/07/2023    ALT 19 02/07/2023       Last Lipids  Lab Results   Component Value Date    CHOL 218 (H) 02/07/2023    TRIG 115 02/07/2023    HDL 63 02/07/2023    LDLCALC 132.0 02/07/2023       Last A1C  Lab Results   Component Value Date    HGBA1C 6.0 (H) 02/07/2023       Last TSH  Lab Results   Component Value Date    TSH 1.859 02/07/2023             Current Med Refills  Medication List with Changes/Refills   Current Medications    AMLODIPINE (NORVASC) 5 MG TABLET    Take 1 tablet (5 mg total) by mouth once daily.       Start Date: 3/7/2023  End Date: 3/6/2024    APPLE CIDER VINEGAR 500 MG TAB           Start Date: --        End Date: --    ASCORBIC ACID, VITAMIN C, (VITAMIN C) 500 MG TABLET    Take 500 mg by mouth once daily.       Start Date: --        End Date: --    ASPIRIN 81 MG TAB    Take by mouth. 1 Tablet Oral Every day       Start Date: --        End Date: --    B-COMPLEX WITH VITAMIN C (Z-BEC OR EQUIV) TABLET           Start Date: --        End Date: --    CICLOPIROX (PENLAC) 8 % SOLN    Apply topically nightly.       Start Date: 11/22/2022End Date: --    COLCHICINE (COLCRYS) 0.6 MG TABLET    Take 2 tablets by mouth, then take 1 tablet by mouth 1 hour later; okay to repeat in 24 hours if warranted       Start Date: 5/6/2022  End Date: --    DICYCLOMINE (BENTYL) 10 MG CAPSULE    Take 1 capsule (10 mg total) by mouth 4 (four) times  daily.       Start Date: 3/20/2023 End Date: --    DOCUSATE SODIUM (COLACE) 100 MG CAPSULE    Take 100 mg by mouth once daily.       Start Date: --        End Date: --    EZETIMIBE (ZETIA) 10 MG TABLET    Take 1 tablet (10 mg total) by mouth once daily.       Start Date: 5/17/2023 End Date: --    GLUC MAURICE/CHONDRO MAURICE A/VIT C/MN (GLUCOSAMINE-CHONDROIT-VIT C-MN) 504-151-37-5 MG TAB    Take by mouth. 1 Tablet Oral Every day       Start Date: --        End Date: --    GREEN TEA LEAF EXTRACT 315 MG CAP    Take by mouth. 1 Capsule Oral Every day       Start Date: --        End Date: --    HYDRALAZINE (APRESOLINE) 25 MG TABLET    Take 1 tablet (25 mg total) by mouth every 12 (twelve) hours.       Start Date: 5/18/2023 End Date: 5/17/2024    LISINOPRIL (PRINIVIL,ZESTRIL) 40 MG TABLET    Take 1 tablet (40 mg total) by mouth once daily.       Start Date: 10/11/2022End Date: --    METOPROLOL SUCCINATE (TOPROL-XL) 50 MG 24 HR TABLET    Take 1 tablet (50 mg total) by mouth once daily.       Start Date: 2/7/2023  End Date: 2/7/2024    OMEGA-3 FATTY ACIDS 1,000 MG CAP    Take by mouth. 1 Capsule Oral Every day       Start Date: --        End Date: --    PANTOPRAZOLE (PROTONIX) 40 MG TABLET    Take 1 tablet (40 mg total) by mouth once daily. Take 30 minutes before breakfast.       Start Date: 2/7/2023  End Date: 2/7/2024    UNABLE TO FIND    medication name: Tumeric 1000 mg daily       Start Date: --        End Date: --    VITAMIN E,DL-ALPHA TOCOPHEROL, (VITAMIN E, BULK, MISC)    by Misc.(Non-Drug; Combo Route) route.       Start Date: --        End Date: --       Order(s) placed per written order guidelines:     Please advise.

## 2023-09-08 PROBLEM — G89.29 CHRONIC MIDLINE LOW BACK PAIN WITH BILATERAL SCIATICA: Status: ACTIVE | Noted: 2023-09-08

## 2023-09-08 PROBLEM — M54.41 CHRONIC MIDLINE LOW BACK PAIN WITH BILATERAL SCIATICA: Status: ACTIVE | Noted: 2023-09-08

## 2023-09-08 PROBLEM — M54.42 CHRONIC MIDLINE LOW BACK PAIN WITH BILATERAL SCIATICA: Status: ACTIVE | Noted: 2023-09-08

## 2023-09-19 ENCOUNTER — CLINICAL SUPPORT (OUTPATIENT)
Dept: REHABILITATION | Facility: HOSPITAL | Age: 68
End: 2023-09-19
Attending: INTERNAL MEDICINE
Payer: MEDICARE

## 2023-09-19 DIAGNOSIS — M54.50 LUMBAR PAIN: ICD-10-CM

## 2023-09-19 DIAGNOSIS — M70.62 TROCHANTERIC BURSITIS OF LEFT HIP: ICD-10-CM

## 2023-09-19 DIAGNOSIS — G89.29 CHRONIC MIDLINE LOW BACK PAIN WITH BILATERAL SCIATICA: ICD-10-CM

## 2023-09-19 DIAGNOSIS — R29.898 WEAKNESS OF BOTH LOWER EXTREMITIES: ICD-10-CM

## 2023-09-19 DIAGNOSIS — M54.42 CHRONIC MIDLINE LOW BACK PAIN WITH BILATERAL SCIATICA: ICD-10-CM

## 2023-09-19 DIAGNOSIS — M54.41 CHRONIC MIDLINE LOW BACK PAIN WITH BILATERAL SCIATICA: ICD-10-CM

## 2023-09-19 DIAGNOSIS — M53.86 DECREASED ROM OF LUMBAR SPINE: ICD-10-CM

## 2023-09-19 PROCEDURE — 97110 THERAPEUTIC EXERCISES: CPT

## 2023-09-19 PROCEDURE — 97161 PT EVAL LOW COMPLEX 20 MIN: CPT

## 2023-09-19 PROCEDURE — 97112 NEUROMUSCULAR REEDUCATION: CPT

## 2023-09-19 NOTE — PLAN OF CARE
[facility]   Physical Therapy Initial Evaluation      Date: 9/19/2023   Name: Riley Marcus  [facility] Number: [id number]    Therapy Diagnosis:   Encounter Diagnoses   Name Primary?    Trochanteric bursitis of left hip     Chronic midline low back pain with bilateral sciatica     Decreased ROM of lumbar spine     Lumbar pain     Weakness of both lower extremities      Physician: Olga Duncan MD    Physician Orders: PT Eval and Treat   Medical Diagnosis from Referral:   M70.62 (ICD-10-CM) - Trochanteric bursitis of left hip   M54.41,M54.42,G89.29 (ICD-10-CM) - Chronic midline low back pain with bilateral sciatica   Evaluation Date: 9/19/2023  Authorization Period Expiration: TBD  Plan of Care Expiration: 12/19/2023  Progress Note Due: 10/19/2023  Visit # / Visits authorized: 1/ 1   FOTO: 1/5    FOTO Initial Evaluation: 65  FOTO 2nd F/U: NA  FOTO Discharge: NA    Precautions: Standard     Time In: 100  Time Out: 155  Total Appointment Time (timed & untimed codes): 55 minutes      SUBJECTIVE     Date of onset: 2-3 months    History of current condition - Riley reports: she has been having back pain that goes into her legs on both side. Patient reports the pain stops just before the knee. Patient reports the pain is in the back of the buttock and into the back of the thighs. Patient reports getting up from a sitting position is painful. Patient reports standing and walking for long periods of time also irritates her symptoms. Patient reports sitting feels better overall. Patient reports she just finished a medrol dose pack which has helped reduce her symptoms by 75%.     Major Trauma- None   History of Cancer- None  Chills/Fever/Weight Loss- None  Night Pain- None  Constant Pain- None  Severe Progressive Weakness- None   Bowel/Bladder Dysfunction- None      Falls: None     Imaging, none    Prior Therapy: Yes for her hips   Social History: Difficulty vacuuming at home.  lives  alone  Occupation: Retired   Prior Level of Function: Working in the yard, gardening, a lot of walking  Current Level of Function: Difficulty with the above    Pain:  Current 0/10, worst 4/10, best 0/10   Location: bilateral back , buttocks , and upper legs   Description: Pulling, catch, spasm   Aggravating Factors: Standing, Walking, and Getting out of bed/chair  Easing Factors: Rest, sitting, medicine    Patients goals: Increased tolerance to resume gardening and walking      Medical History:   Past Medical History:   Diagnosis Date    Arthritis     Blood transfusion     Chronic midline low back pain with bilateral sciatica 2023    Corneal abrasion 20 yrs    ? eye due to cls     GERD (gastroesophageal reflux disease)     Hyperlipidemia     Hypertension        Surgical History:   Riley Marcus  has a past surgical history that includes Hysterectomy; Breast reduction; Mini-laparotomy (); Laser laparoscopy (); Eye surgery; Total Reduction Mammoplasty; Oophorectomy; Breast surgery; Esophagogastroduodenoscopy (N/A, 2022); and Colonoscopy (N/A, 2022).    Medications:   Riley has a current medication list which includes the following prescription(s): amlodipine, apple cider vinegar, ascorbic acid (vitamin c), aspirin, b-complex with vitamin c, biotin, ciclopirox, colchicine (gout), dicyclomine, docusate sodium, ezetimibe, glucosamine-chondroit-vit c-mn, green tea leaf extract, hydralazine, lisinopril, methylprednisolone, metoprolol succinate, omega-3 fatty acids, pantoprazole, UNABLE TO FIND, and vitamin e.    Allergies:   Review of patient's allergies indicates:   Allergen Reactions    Sulfa (sulfonamide antibiotics) Itching and Rash     Other reaction(s): Rash          OBJECTIVE     Lumbar AROM: Pain/Dysfunction with Movement:   Flexion: 90 degrees    Extension: 30 degrees    Right side bendin degrees    Left side bendin degrees    Right rotation: 25% limited    Left rotation:  25% limited Pain from the L waist to the buttock      Myotome / MMT Right Left Pain/Dysfunction with Movement   L1,2- Hip Flexion (Femoral Nerve) 4+/5 4+/5    L3,4- Knee Extension (Femoral Nerve) 5/5 5/5    L4,5- Ankle Dorsiflexion (Deep Fibular Nerve) 5/5 5/5    L5- Big Toe Extension (Deep Fibular Nerve) 5/5 5/5    L5, S1- Ankle plantarflexion (Tibial Nerve) 5/5 5/5    Hip Abduction 4/5 4/5    Knee Flexion 4+/5 4+/5         Posture: fwd slumped minimally     Repeated Motions Positive/Negative   Flexion  Improved symptoms   Extension  -    Right Side Bend  -   Left Side Bend  -     Neural Tension Positive/Negative   Passive SLR w/ DF  Negative      Prone Knee Bend (Femoral N):    Sacroiliac Screen:  Negative   Distraction  Thigh Thrust  [place]  Sacral Thrust  Compression      Palpation: No tenderness at paraspinals or gluteal        FOTO Hip Survey    Therapist reviewed FOTO scores for Riley Marcus on 9/19/2023.   FOTO documents entered into [email or web address] - see Media section.    Intake Score: 65         TREATMENT     Total Treatment time (time-based codes) separate from Evaluation: 25 minutes      Riley received the treatments listed below:      therapeutic exercises to develop strength, endurance, ROM, and flexibility for 10 minutes including:    DKTC   - 5s 20x  LTR   - 5s x 20   Piriformis stretch   - 30s x 3   Ball Rolls   - 5s x 20 fwd      NEUROMUSCULAR RE-EDUCATION ACTIVITIES to improve Posture and Motor Control for 15 minutes.  The following were included: .    PPT with biofeedback   - 10x  PPT and SB presses with biofeedback   - 20x  Hip abd iso ring  - 5s x 20  Hip add iso ball  - 5s x 20         PATIENT EDUCATION AND HOME EXERCISES     Education provided:   - Purpose of PT  - HEP    Written Home Exercises Provided: yes. Exercises were reviewed and [place] was able to demonstrate them prior to the end of the session.  Riley demonstrated good  understanding of the education provided. See EMR under  Patient Instructions for exercises provided during therapy sessions.    ASSESSMENT     Riley is a 68 y.o. female referred to outpatient Physical Therapy with a medical diagnosis of chronic bilateral back pain with bilateral sciatica. Patient presents with a chronic history of back pain, decreased lumbar range of motion, decreased LE strength, and poor posture. Patients subjective/objective assessment present similarly to lumbar stenosis. Patient was progressed with therex and neuro re ed to address lumbar mobility, posture, and core engagement. Patient did well today without adverse effects    Patient prognosis is Good.   Patient will benefit from skilled outpatient Physical Therapy to address the deficits stated above and in the chart below, provide patient /family education, and to maximize patientt's level of independence.     Plan of care discussed with patient: Yes  Patient's spiritual, cultural and educational needs considered and patient is agreeable to the plan of care and goals as stated below:     Anticipated Barriers for therapy: None    Medical Necessity is demonstrated by the following  History  Co-morbidities and personal factors that may impact the plan of care Co-morbidities:   HTN    Personal Factors:   no deficits     low   Examination  Body Structures and Functions, activity limitations and participation restrictions that may impact the plan of care Body Regions:   back  lower extremities    Body Systems:    ROM  strength  motor control    Participation Restrictions:   None    Activity limitations:   Learning and applying knowledge  no deficits    General Tasks and Commands  no deficits    Communication  no deficits    Mobility  no deficits    Self care  no deficits    Domestic Life  doing house work (cleaning house, washing dishes, laundry)    Interactions/Relationships  no deficits    Life Areas  no deficits    Community and Social Life  recreation and leisure         high   Clinical  Presentation stable and uncomplicated low   Decision Making/ Complexity Score: low     Goals:  Short Term Goals (4 Weeks):  1. Pt will be compliant with HEP to supplement PT in decreasing pain with functional mobility.  2. Pt will perform pallof press with good control to demonstrate improved core strength.  3. Pt will improve lumbar ROM by 5 degrees in all planes to improve functional mobility.  Long Term Goals (8 Weeks):   1. Pt will improve FOTO score to >/= 70 to decrease perceived limitation with maintaining/changing body position.   2. Pt will perform goblet squat with good control to decreased risk of re injuring back.  3. Pt will improve impaired LE MMTs by 1/2  score to improve strength for functional tasks.  4. Pt will report no pain during lumbar ROM to promote functional mobility.       PLAN   Plan of care Certification: 9/19/2023 to 12/19/2023.    Outpatient Physical Therapy 2 times weekly for 6 weeks to include the following interventions: Cervical/Lumbar Traction, Manual Therapy, Moist Heat/ Ice, Neuromuscular Re-ed, Patient Education, Self Care, Therapeutic Activities, Therapeutic Exercise, and FDN.     Sg Kelly, PT      I CERTIFY THE NEED FOR THESE SERVICES FURNISHED UNDER THIS PLAN OF TREATMENT AND WHILE UNDER MY CARE   Physician's comments:     Physician's Signature: ___________________________________________________

## 2023-10-03 ENCOUNTER — CLINICAL SUPPORT (OUTPATIENT)
Dept: REHABILITATION | Facility: HOSPITAL | Age: 68
End: 2023-10-03
Payer: MEDICARE

## 2023-10-03 DIAGNOSIS — M53.86 DECREASED ROM OF LUMBAR SPINE: Primary | ICD-10-CM

## 2023-10-03 DIAGNOSIS — M54.50 LUMBAR PAIN: ICD-10-CM

## 2023-10-03 DIAGNOSIS — R29.898 WEAKNESS OF BOTH LOWER EXTREMITIES: ICD-10-CM

## 2023-10-03 PROCEDURE — 97112 NEUROMUSCULAR REEDUCATION: CPT | Mod: CQ

## 2023-10-03 PROCEDURE — 97110 THERAPEUTIC EXERCISES: CPT | Mod: CQ

## 2023-10-03 NOTE — PROGRESS NOTES
"[facility]   Physical Therapy Treatment Note      Name: Riley Marcus  [facility] Number: [id number]    Therapy Diagnosis:   Encounter Diagnoses   Name Primary?    Decreased ROM of lumbar spine Yes    Lumbar pain     Weakness of both lower extremities      Physician: Olga Duncan MD    Visit Date: 10/3/2023       Physician Orders: PT Eval and Treat   Medical Diagnosis from Referral:   M70.62 (ICD-10-CM) - Trochanteric bursitis of left hip   M54.41,M54.42,G89.29 (ICD-10-CM) - Chronic midline low back pain with bilateral sciatica   Evaluation Date: 9/19/2023  Authorization Period Expiration: TBD  Plan of Care Expiration: 12/19/2023  Progress Note Due: 10/19/2023  Visit # / Visits authorized: 1/ 1, 1/20  FOTO: 1/5     FOTO Initial Evaluation: 65  FOTO 2nd F/U: NA  FOTO Discharge: NA     Precautions: Standard      PTA Visit #: 1/5     Time In: 11:20 am   Time Out: 12:00 pm   Total Billable Time: 40 minutes    Subjective     Pt reports: that she continues with some soreness in her back and gets a "jolt" of pain with certain movements.   She was compliant with home exercise program.  Response to previous treatment: last session was initial eval  Functional change: none at this time     Pain: 0/10  Location: n/a     Objective      Objective Measures updated at progress report unless specified.     Treatment     Riley received the treatments listed below:      therapeutic exercises to develop strength, endurance, ROM, and flexibility for 30 minutes including:  Nu-step 6 min (for improved LE ROM and reciprocal UE/LE movement)   DKTC   - 5s 20x  LTR   - 5s x 20   Piriformis stretch   - 30s x 3   Ball Rolls   - 5s x 20 fwd        NEUROMUSCULAR RE-EDUCATION ACTIVITIES to improve Posture and Motor Control for 10 minutes.  The following were included: .     PPT with biofeedback (not performed today)   - 10x  PPT and SB presses with biofeedback (not performed today)   - 20x  Hip abd iso ring  - 5s " x 20  Hip add iso ball  - 5s x 20       therapeutic activities to improve functional performance for 0  minutes, including:      Patient Education and Home Exercises       Education provided:   - HEP    Written Home Exercises Provided: Patient instructed to cont prior HEP. Exercises were reviewed and Riley was able to demonstrate them prior to the end of the session.  Riley demonstrated good  understanding of the education provided. See EMR under Patient Instructions for exercises provided during therapy sessions    Assessment     Initiated therapy program following pt's initial evaluation with no adverse effects. Continued with established therex, as well as initiated Nu-step for aforementioned benefits. Will continue to progress pt's core and LE strength during future sessions.     Riley Is progressing well towards her goals.   Pt prognosis is Good.     Pt will continue to benefit from skilled outpatient physical therapy to address the deficits listed in the problem list box on initial evaluation, provide pt/family education and to maximize pt's level of independence in the home and community environment.     Pt's spiritual, cultural and educational needs considered and pt agreeable to plan of care and goals.     Anticipated barriers to physical therapy: none    Goals:   Short Term Goals (4 Weeks):  1. Pt will be compliant with HEP to supplement PT in decreasing pain with functional mobility.  2. Pt will perform pallof press with good control to demonstrate improved core strength.  3. Pt will improve lumbar ROM by 5 degrees in all planes to improve functional mobility.  Long Term Goals (8 Weeks):   1. Pt will improve FOTO score to >/= 70 to decrease perceived limitation with maintaining/changing body position.   2. Pt will perform goblet squat with good control to decreased risk of re injuring back.  3. Pt will improve impaired LE MMTs by 1/2  score to improve strength for functional tasks.  4. Pt will  report no pain during lumbar ROM to promote functional mobility.     Plan     Plan of care Certification: 9/19/2023 to 12/19/2023.     Joy Ott, PTA

## 2023-10-05 ENCOUNTER — CLINICAL SUPPORT (OUTPATIENT)
Dept: REHABILITATION | Facility: HOSPITAL | Age: 68
End: 2023-10-05
Payer: MEDICARE

## 2023-10-05 DIAGNOSIS — M54.50 LUMBAR PAIN: ICD-10-CM

## 2023-10-05 DIAGNOSIS — M53.86 DECREASED ROM OF LUMBAR SPINE: Primary | ICD-10-CM

## 2023-10-05 DIAGNOSIS — R29.898 WEAKNESS OF BOTH LOWER EXTREMITIES: ICD-10-CM

## 2023-10-05 PROCEDURE — 97112 NEUROMUSCULAR REEDUCATION: CPT | Mod: CQ

## 2023-10-05 PROCEDURE — 97110 THERAPEUTIC EXERCISES: CPT | Mod: CQ

## 2023-10-05 NOTE — PROGRESS NOTES
[facility]   Physical Therapy Treatment Note      Name: Riley Marcus  [facility] Number: [id number]    Therapy Diagnosis:   Encounter Diagnoses   Name Primary?    Decreased ROM of lumbar spine Yes    Lumbar pain     Weakness of both lower extremities        Physician: Olga Duncan MD    Visit Date: 10/5/2023       Physician Orders: PT Eval and Treat   Medical Diagnosis from Referral:   M70.62 (ICD-10-CM) - Trochanteric bursitis of left hip   M54.41,M54.42,G89.29 (ICD-10-CM) - Chronic midline low back pain with bilateral sciatica   Evaluation Date: 9/19/2023  Authorization Period Expiration: TBD  Plan of Care Expiration: 12/19/2023  Progress Note Due: 10/19/2023  Visit # / Visits authorized: 1/ 1, 2/20  FOTO: 1/5     FOTO Initial Evaluation: 65  FOTO 2nd F/U: NA  FOTO Discharge: NA     Precautions: Standard      PTA Visit #: 2/5     Time In: 11:42 am   Time Out: 12:20 pm   Total Billable Time: 38 minutes    Subjective     Pt reports: that she was a little sore after her last appointment but overall is feeling less back pain.   She was compliant with home exercise program.  Response to previous treatment: no adverse effects   Functional change: none at this time     Pain: 0/10  Location: n/a     Objective      Objective Measures updated at progress report unless specified.     Treatment     Riley received the treatments listed below:      therapeutic exercises to develop strength, endurance, ROM, and flexibility for 15 minutes including:  Nu-step 6 min (for improved LE ROM and reciprocal UE/LE movement)   DKTC   - 5s 20x  LTR   - 5s x 20   Piriformis stretch   - 30s x 3   Ball Rolls   - 5s x 20 fwd        NEUROMUSCULAR RE-EDUCATION ACTIVITIES to improve Posture and Motor Control for 23  minutes.  The following were included: .     PPT with biofeedback   - 10x  PPT and SB presses with biofeedback   - 20x  Hip abd iso ring  - 5s x 20  Hip add iso ball  - 5s x 20   Pallof press DBL RTB  15x ea   SAPD + ramiro RTB 2x10       therapeutic activities to improve functional performance for 0  minutes, including:      Patient Education and Home Exercises       Education provided:   - HEP    Written Home Exercises Provided: Patient instructed to cont prior HEP. Exercises were reviewed and Riley was able to demonstrate them prior to the end of the session.  Riley demonstrated good  understanding of the education provided. See EMR under Patient Instructions for exercises provided during therapy sessions    Assessment     Initiated additional NMR this session for improved core stability with dynamic movements. Demonstrations given for initiation of several activities, however pt able to complete with good form after first couple of reps. Will continue to work towards improved LE strengthening in addition to established activities during future sessions.     Riley Is progressing well towards her goals.   Pt prognosis is Good.     Pt will continue to benefit from skilled outpatient physical therapy to address the deficits listed in the problem list box on initial evaluation, provide pt/family education and to maximize pt's level of independence in the home and community environment.     Pt's spiritual, cultural and educational needs considered and pt agreeable to plan of care and goals.     Anticipated barriers to physical therapy: none    Goals:   Short Term Goals (4 Weeks):  1. Pt will be compliant with HEP to supplement PT in decreasing pain with functional mobility.  2. Pt will perform pallof press with good control to demonstrate improved core strength.  3. Pt will improve lumbar ROM by 5 degrees in all planes to improve functional mobility.  Long Term Goals (8 Weeks):   1. Pt will improve FOTO score to >/= 70 to decrease perceived limitation with maintaining/changing body position.   2. Pt will perform goblet squat with good control to decreased risk of re injuring back.  3. Pt will improve  impaired LE MMTs by 1/2  score to improve strength for functional tasks.  4. Pt will report no pain during lumbar ROM to promote functional mobility.     Plan     Plan of care Certification: 9/19/2023 to 12/19/2023.     Joy Ott, PTA

## 2023-10-06 NOTE — PROGRESS NOTES
[facility]   Physical Therapy Treatment Note      Name: Riley Marcus  [facility] Number: [id number]    Therapy Diagnosis:   Encounter Diagnoses   Name Primary?    Decreased ROM of lumbar spine Yes    Lumbar pain     Weakness of both lower extremities          Physician: Olga Duncan MD    Visit Date: 10/9/2023       Physician Orders: PT Eval and Treat   Medical Diagnosis from Referral:   M70.62 (ICD-10-CM) - Trochanteric bursitis of left hip   M54.41,M54.42,G89.29 (ICD-10-CM) - Chronic midline low back pain with bilateral sciatica   Evaluation Date: 9/19/2023  Authorization Period Expiration: 12/31/2023  Plan of Care Expiration: 12/19/2023  Progress Note Due: 10/19/2023  Visit # / Visits authorized: 1/ 1, 3/20  FOTO: 4/5     FOTO Initial Evaluation: 65  FOTO 2nd F/U: NA  FOTO Discharge: NA     Precautions: Standard      PTA Visit #: 2/5     Time In: 830   Time Out: 915   Total Billable Time: 45 minutes    Subjective     Pt reports: she is feeling good today overall, but still has lower back/buttock pain.  that she was a little sore after her last appointment but overall is feeling less back pain.   She was compliant with home exercise program.  Response to previous treatment: no adverse effects   Functional change: none at this time     Pain: 4/10  Location: n/a     Objective      Objective Measures updated at progress report unless specified.     Treatment     Riley received the treatments listed below:      therapeutic exercises to develop strength, endurance, ROM, and flexibility for 25 minutes including:    Nu-step 6 min (for improved LE ROM and reciprocal UE/LE movement)   DKTC   - 5s 20x  LTR   - 5s x 20   Piriformis stretch   - 30s x 3   Ball Rolls   - 5s x 20 fwd   Shuttle leg press   - 2.5 cord x20  - 3 cord x20      NEUROMUSCULAR RE-EDUCATION ACTIVITIES to improve Posture and Motor Control for 20  minutes.  The following were included: .       Hip abd iso ring  - 5s x  20  Hip add iso ball  - 5s x 20   Hip abd iso with bridging   - 2x10   Pallof press DBL RTB 15x ea   SAPD + marches RTB 2x10       therapeutic activities to improve functional performance for 0  minutes, including:      Patient Education and Home Exercises       Education provided:   - HEP    Written Home Exercises Provided: Patient instructed to cont prior HEP. Exercises were reviewed and Riley was able to demonstrate them prior to the end of the session.  Riley demonstrated good  understanding of the education provided. See EMR under Patient Instructions for exercises provided during therapy sessions    Assessment       Continued therex and NMR for improved lumbar mobility, LE strength, core stability with dynamic movements. Patient tolerated all exercises well today with out adverse effects. Cont to progress core strength and functional activities.           Riley Is progressing well towards her goals.   Pt prognosis is Good.     Pt will continue to benefit from skilled outpatient physical therapy to address the deficits listed in the problem list box on initial evaluation, provide pt/family education and to maximize pt's level of independence in the home and community environment.     Pt's spiritual, cultural and educational needs considered and pt agreeable to plan of care and goals.     Anticipated barriers to physical therapy: none    Goals:   Short Term Goals (4 Weeks):  1. Pt will be compliant with HEP to supplement PT in decreasing pain with functional mobility.  2. Pt will perform pallof press with good control to demonstrate improved core strength.  3. Pt will improve lumbar ROM by 5 degrees in all planes to improve functional mobility.  Long Term Goals (8 Weeks):   1. Pt will improve FOTO score to >/= 70 to decrease perceived limitation with maintaining/changing body position.   2. Pt will perform goblet squat with good control to decreased risk of re injuring back.  3. Pt will improve  impaired LE MMTs by 1/2  score to improve strength for functional tasks.  4. Pt will report no pain during lumbar ROM to promote functional mobility.     Plan     Plan of care Certification: 9/19/2023 to 12/19/2023.     Sg Kelly, PT

## 2023-10-09 ENCOUNTER — CLINICAL SUPPORT (OUTPATIENT)
Dept: REHABILITATION | Facility: HOSPITAL | Age: 68
End: 2023-10-09
Payer: MEDICARE

## 2023-10-09 DIAGNOSIS — M53.86 DECREASED ROM OF LUMBAR SPINE: Primary | ICD-10-CM

## 2023-10-09 DIAGNOSIS — M54.50 LUMBAR PAIN: ICD-10-CM

## 2023-10-09 DIAGNOSIS — R29.898 WEAKNESS OF BOTH LOWER EXTREMITIES: ICD-10-CM

## 2023-10-09 PROCEDURE — 97112 NEUROMUSCULAR REEDUCATION: CPT

## 2023-10-09 PROCEDURE — 97110 THERAPEUTIC EXERCISES: CPT

## 2023-10-12 ENCOUNTER — CLINICAL SUPPORT (OUTPATIENT)
Dept: REHABILITATION | Facility: HOSPITAL | Age: 68
End: 2023-10-12
Payer: MEDICARE

## 2023-10-12 DIAGNOSIS — M54.50 LUMBAR PAIN: ICD-10-CM

## 2023-10-12 DIAGNOSIS — R29.898 WEAKNESS OF BOTH LOWER EXTREMITIES: ICD-10-CM

## 2023-10-12 DIAGNOSIS — M53.86 DECREASED ROM OF LUMBAR SPINE: Primary | ICD-10-CM

## 2023-10-12 PROCEDURE — 97530 THERAPEUTIC ACTIVITIES: CPT

## 2023-10-12 PROCEDURE — 97110 THERAPEUTIC EXERCISES: CPT

## 2023-10-12 PROCEDURE — 97112 NEUROMUSCULAR REEDUCATION: CPT

## 2023-10-12 NOTE — PROGRESS NOTES
[facility]   Physical Therapy Treatment Note      Name: Riley Marcus  [facility] Number: [id number]    Therapy Diagnosis:   Encounter Diagnoses   Name Primary?    Decreased ROM of lumbar spine Yes    Lumbar pain     Weakness of both lower extremities        Physician: Olga Duncan MD    Visit Date: 10/12/2023       Physician Orders: PT Eval and Treat   Medical Diagnosis from Referral:   M70.62 (ICD-10-CM) - Trochanteric bursitis of left hip   M54.41,M54.42,G89.29 (ICD-10-CM) - Chronic midline low back pain with bilateral sciatica   Evaluation Date: 9/19/2023  Authorization Period Expiration: 12/31/2023  Plan of Care Expiration: 12/19/2023  Progress Note Due: 10/19/2023  Visit # / Visits authorized: 1/ 1, 3/20  FOTO: 4/5     FOTO Initial Evaluation: 65  FOTO 2nd F/U: NA  FOTO Discharge: NA     Precautions: Standard      PTA Visit #: 2/5     Time In: 12:40am  Time Out: 1:35pm   Total Billable Time: 55 minutes    Subjective     Pt reports: her back pain is less, but her hips still bother her   She was compliant with home exercise program.  Response to previous treatment: no adverse effects   Functional change: none at this time     Pain: 4/10  Location: n/a     Objective      Objective Measures updated at progress report unless specified.     Treatment     Riley received the treatments listed below:      therapeutic exercises to develop strength, endurance, ROM, and flexibility for 20  minutes including:    Nu-step 8 min (for improved LE ROM and reciprocal UE/LE movement)   DKTC   - 5s 20x  LTR - not performed   - 5s x 20   Piriformis stretch   - 30s x 3   Ball Rolls   - 5s x 10 (3 directions)       NEUROMUSCULAR RE-EDUCATION ACTIVITIES to improve Posture and Motor Control for 23 minutes.  The following were included: .     Hip abd iso ring  - 5s x 20  Hip add iso ball  - 5s x 30   Hip abd iso with bridging   - 2x10   Pallof press DBL CC 7# x20  SAPD + marches CC 7# 2x10        therapeutic activities to improve functional performance for 10   minutes, including:     Shuttle double leg press   - 3.5 cord 3x10   Single leg press   -1.5 c x20 B     Patient Education and Home Exercises       Education provided:   - HEP    Written Home Exercises Provided: Patient instructed to cont prior HEP. Exercises were reviewed and Riley was able to demonstrate them prior to the end of the session.  Riley demonstrated good  understanding of the education provided. See EMR under Patient Instructions for exercises provided during therapy sessions    Assessment       Pt tolerated session well. Progressed functional strength training with single leg press on shuttle and with increased resistance on core stability training, for increased ease with prolonged walking. No c/o increased hip pain throughout session.       Riley Is progressing well towards her goals.   Pt prognosis is Good.     Pt will continue to benefit from skilled outpatient physical therapy to address the deficits listed in the problem list box on initial evaluation, provide pt/family education and to maximize pt's level of independence in the home and community environment.     Pt's spiritual, cultural and educational needs considered and pt agreeable to plan of care and goals.     Anticipated barriers to physical therapy: none    Goals:   Short Term Goals (4 Weeks):  1. Pt will be compliant with HEP to supplement PT in decreasing pain with functional mobility.  2. Pt will perform pallof press with good control to demonstrate improved core strength.  3. Pt will improve lumbar ROM by 5 degrees in all planes to improve functional mobility.  Long Term Goals (8 Weeks):   1. Pt will improve FOTO score to >/= 70 to decrease perceived limitation with maintaining/changing body position.   2. Pt will perform goblet squat with good control to decreased risk of re injuring back.  3. Pt will improve impaired LE MMTs by 1/2  score to  improve strength for functional tasks.  4. Pt will report no pain during lumbar ROM to promote functional mobility.     Plan     Plan of care Certification: 9/19/2023 to 12/19/2023.     Lesa Dumas, PT

## 2023-10-17 ENCOUNTER — CLINICAL SUPPORT (OUTPATIENT)
Dept: REHABILITATION | Facility: HOSPITAL | Age: 68
End: 2023-10-17
Payer: MEDICARE

## 2023-10-17 DIAGNOSIS — M54.50 LUMBAR PAIN: ICD-10-CM

## 2023-10-17 DIAGNOSIS — M53.86 DECREASED ROM OF LUMBAR SPINE: Primary | ICD-10-CM

## 2023-10-17 DIAGNOSIS — R29.898 WEAKNESS OF BOTH LOWER EXTREMITIES: ICD-10-CM

## 2023-10-17 PROCEDURE — 97110 THERAPEUTIC EXERCISES: CPT | Mod: CQ

## 2023-10-17 NOTE — PROGRESS NOTES
[facility]   Physical Therapy Treatment Note      Name: Riley Marcus  [facility] Number: [id number]    Therapy Diagnosis:   Encounter Diagnoses   Name Primary?    Decreased ROM of lumbar spine Yes    Lumbar pain     Weakness of both lower extremities        Physician: Olga Duncan MD    Visit Date: 10/17/2023       Physician Orders: PT Eval and Treat   Medical Diagnosis from Referral:   M70.62 (ICD-10-CM) - Trochanteric bursitis of left hip   M54.41,M54.42,G89.29 (ICD-10-CM) - Chronic midline low back pain with bilateral sciatica   Evaluation Date: 9/19/2023  Authorization Period Expiration: 12/31/2023  Plan of Care Expiration: 12/19/2023  Progress Note Due: 10/19/2023  Visit # / Visits authorized: 1/ 1, 3/20  FOTO: 4/5     FOTO Initial Evaluation: 65  FOTO 2nd F/U: NA  FOTO Discharge: NA     Precautions: Standard      PTA Visit #: 2/5     Time In: 11:40 am  Time Out: 1200 pm   Total Billable Time: 20 minutes    Subjective     Pt reports: her back pain is less, but her hips still bother her   She was compliant with home exercise program.  Response to previous treatment: no adverse effects   Functional change: none at this time     Pain: 4/10  Location: n/a     Objective      Objective Measures updated at progress report unless specified.     Treatment     Riley received the treatments listed below:      therapeutic exercises to develop strength, endurance, ROM, and flexibility for 15  minutes including:    Nu-step 8 min (for improved LE ROM and reciprocal UE/LE movement) NT  DKTC   - 5s 20x  LTR   - 5s x 20   Piriformis stretch   - 30s x 3   Ball Rolls   - 5s x 10 (3 directions)       NEUROMUSCULAR RE-EDUCATION ACTIVITIES to improve Posture and Motor Control for 5 minutes.  The following were included: .     Hip abd iso ring  - 5s x 20  Hip add iso ball  - 5s x 30   Hip abd iso with bridging NP  - 2x10   Pallof press DBL CC 7# x20 NP  SAPD + marches CC 7# 2x10 NP      therapeutic  activities to improve functional performance for 0 minutes, including:     Shuttle double leg press   - 3.5 cord 3x10   Single leg press   -1.5 c x20 B     Patient Education and Home Exercises       Education provided:   - HEP    Written Home Exercises Provided: Patient instructed to cont prior HEP. Exercises were reviewed and Riley was able to demonstrate them prior to the end of the session.  Riley demonstrated good  understanding of the education provided. See EMR under Patient Instructions for exercises provided during therapy sessions    Assessment       Pt tolerated session well. Patient arrived late to PT therefore altered tx. No c/o increased hip pain throughout session.       Riley Is progressing well towards her goals.   Pt prognosis is Good.     Pt will continue to benefit from skilled outpatient physical therapy to address the deficits listed in the problem list box on initial evaluation, provide pt/family education and to maximize pt's level of independence in the home and community environment.     Pt's spiritual, cultural and educational needs considered and pt agreeable to plan of care and goals.     Anticipated barriers to physical therapy: none    Goals:   Short Term Goals (4 Weeks):  1. Pt will be compliant with HEP to supplement PT in decreasing pain with functional mobility.  2. Pt will perform pallof press with good control to demonstrate improved core strength.  3. Pt will improve lumbar ROM by 5 degrees in all planes to improve functional mobility.  Long Term Goals (8 Weeks):   1. Pt will improve FOTO score to >/= 70 to decrease perceived limitation with maintaining/changing body position.   2. Pt will perform goblet squat with good control to decreased risk of re injuring back.  3. Pt will improve impaired LE MMTs by 1/2  score to improve strength for functional tasks.  4. Pt will report no pain during lumbar ROM to promote functional mobility.     Plan     Plan of care  Certification: 9/19/2023 to 12/19/2023.     Amilcar Chiang, PTA

## 2023-10-19 ENCOUNTER — DOCUMENTATION ONLY (OUTPATIENT)
Dept: REHABILITATION | Facility: HOSPITAL | Age: 68
End: 2023-10-19

## 2023-10-19 ENCOUNTER — CLINICAL SUPPORT (OUTPATIENT)
Dept: REHABILITATION | Facility: HOSPITAL | Age: 68
End: 2023-10-19
Payer: MEDICARE

## 2023-10-19 DIAGNOSIS — M54.50 LUMBAR PAIN: ICD-10-CM

## 2023-10-19 DIAGNOSIS — R29.898 WEAKNESS OF BOTH LOWER EXTREMITIES: ICD-10-CM

## 2023-10-19 DIAGNOSIS — M53.86 DECREASED ROM OF LUMBAR SPINE: Primary | ICD-10-CM

## 2023-10-19 PROCEDURE — 97112 NEUROMUSCULAR REEDUCATION: CPT | Mod: CQ

## 2023-10-19 PROCEDURE — 97110 THERAPEUTIC EXERCISES: CPT | Mod: CQ

## 2023-10-19 NOTE — PROGRESS NOTES
PT/PTA met face to face to discuss pt's treatment plan and progress towards established goals. Pt will be seen by a physical therapist minimally every 6th visit or every 30 days.    Joy Ott PTA      Face to Face PTA Conference performed with Jyo Ott PTA and Amilcar Chiang PTA regarding patient's current status, overall progress, and plan of care.    Sg Kelly, PT

## 2023-10-19 NOTE — PROGRESS NOTES
[facility]   Physical Therapy Treatment Note      Name: Riley Marcus  [facility] Number: [id number]    Therapy Diagnosis:   Encounter Diagnoses   Name Primary?    Decreased ROM of lumbar spine Yes    Lumbar pain     Weakness of both lower extremities        Physician: Olga Duncan MD    Visit Date: 10/19/2023       Physician Orders: PT Eval and Treat   Medical Diagnosis from Referral:   M70.62 (ICD-10-CM) - Trochanteric bursitis of left hip   M54.41,M54.42,G89.29 (ICD-10-CM) - Chronic midline low back pain with bilateral sciatica   Evaluation Date: 9/19/2023  Authorization Period Expiration: 12/31/2023  Plan of Care Expiration: 12/19/2023  Progress Note Due: 10/19/2023  Visit # / Visits authorized: 1/ 1, 6/20  FOTO: 4/5     FOTO Initial Evaluation: 65  FOTO 2nd F/U: NA  FOTO Discharge: NA     Precautions: Standard      PTA Visit #: 2/5     Time In: 11:30 am  Time Out: 1215 pm   Total Billable Time: 45 minutes    Subjective     Pt reports: she is hurting today. Mostly L low back.  She was compliant with home exercise program.  Response to previous treatment: no adverse effects   Functional change: none at this time     Pain: 7/10  Location: L low back    Objective      Objective Measures updated at progress report unless specified.     Treatment     Riley received the treatments listed below:      therapeutic exercises to develop strength, endurance, ROM, and flexibility for 15  minutes including:    Nu-step 10 min (for improved LE ROM and reciprocal UE/LE movement)   DKTC   - 5s 20x  LTR   - 5s x 20   Piriformis stretch   - 30s x 3   Ball Rolls   - 5s x 10 (3 directions)       NEUROMUSCULAR RE-EDUCATION ACTIVITIES to improve Posture and Motor Control for 30 minutes.  The following were included: .     Hip abd iso ring  - 5s x 20  Hip add iso ball  - 5s x 20   Hip abd iso with bridging   - 2x10   Pallof press DBL CC 7# x20 NP  SAPD + marches CC 7# 2x10 NP    MHP to lumbar spine for  all supine therex      therapeutic activities to improve functional performance for 0 minutes, including:     Shuttle double leg press   - 3.5 cord 3x10   Single leg press   -1.5 c x20 B     Patient Education and Home Exercises       Education provided:   - HEP    Written Home Exercises Provided: Patient instructed to cont prior HEP. Exercises were reviewed and Riley was able to demonstrate them prior to the end of the session.  Riley demonstrated good  understanding of the education provided. See EMR under Patient Instructions for exercises provided during therapy sessions    Assessment       Pt tolerated session well. Limited progressions today d/t increased symptoms. No c/o increased hip pain throughout session.       Riley Is progressing well towards her goals.   Pt prognosis is Good.     Pt will continue to benefit from skilled outpatient physical therapy to address the deficits listed in the problem list box on initial evaluation, provide pt/family education and to maximize pt's level of independence in the home and community environment.     Pt's spiritual, cultural and educational needs considered and pt agreeable to plan of care and goals.     Anticipated barriers to physical therapy: none    Goals:   Short Term Goals (4 Weeks):  1. Pt will be compliant with HEP to supplement PT in decreasing pain with functional mobility.  2. Pt will perform pallof press with good control to demonstrate improved core strength.  3. Pt will improve lumbar ROM by 5 degrees in all planes to improve functional mobility.  Long Term Goals (8 Weeks):   1. Pt will improve FOTO score to >/= 70 to decrease perceived limitation with maintaining/changing body position.   2. Pt will perform goblet squat with good control to decreased risk of re injuring back.  3. Pt will improve impaired LE MMTs by 1/2  score to improve strength for functional tasks.  4. Pt will report no pain during lumbar ROM to promote functional  mobility.     Plan     Plan of care Certification: 9/19/2023 to 12/19/2023.     Amilcar Chiang, PTA

## 2023-11-22 DIAGNOSIS — N18.31 STAGE 3A CHRONIC KIDNEY DISEASE: Primary | ICD-10-CM

## 2023-11-30 ENCOUNTER — HOSPITAL ENCOUNTER (OUTPATIENT)
Dept: RADIOLOGY | Facility: HOSPITAL | Age: 68
Discharge: HOME | End: 2023-11-30
Attending: INTERNAL MEDICINE
Payer: MEDICARE

## 2023-11-30 DIAGNOSIS — G89.29 CHRONIC MIDLINE LOW BACK PAIN WITH BILATERAL SCIATICA: ICD-10-CM

## 2023-11-30 DIAGNOSIS — Z12.31 ENCOUNTER FOR SCREENING MAMMOGRAM FOR BREAST CANCER: ICD-10-CM

## 2023-11-30 DIAGNOSIS — M54.42 CHRONIC MIDLINE LOW BACK PAIN WITH BILATERAL SCIATICA: ICD-10-CM

## 2023-11-30 DIAGNOSIS — M54.41 CHRONIC MIDLINE LOW BACK PAIN WITH BILATERAL SCIATICA: ICD-10-CM

## 2023-11-30 DIAGNOSIS — M54.9 DORSALGIA, UNSPECIFIED: ICD-10-CM

## 2023-11-30 PROCEDURE — 77067 SCR MAMMO BI INCL CAD: CPT | Mod: 26,,, | Performed by: RADIOLOGY

## 2023-11-30 PROCEDURE — 77063 MAMMO DIGITAL SCREENING BILAT WITH TOMO: ICD-10-PCS | Mod: 26,,, | Performed by: RADIOLOGY

## 2023-11-30 PROCEDURE — 72148 MRI LUMBAR SPINE W/O DYE: CPT | Mod: 26,,, | Performed by: RADIOLOGY

## 2023-11-30 PROCEDURE — 72148 MRI LUMBAR SPINE W/O DYE: CPT | Mod: TC

## 2023-11-30 PROCEDURE — 77063 BREAST TOMOSYNTHESIS BI: CPT | Mod: 26,,, | Performed by: RADIOLOGY

## 2023-11-30 PROCEDURE — 77067 SCR MAMMO BI INCL CAD: CPT | Mod: TC

## 2023-11-30 PROCEDURE — 72148 MRI LUMBAR SPINE WITHOUT CONTRAST: ICD-10-PCS | Mod: 26,,, | Performed by: RADIOLOGY

## 2023-11-30 PROCEDURE — 77067 MAMMO DIGITAL SCREENING BILAT WITH TOMO: ICD-10-PCS | Mod: 26,,, | Performed by: RADIOLOGY

## 2023-12-01 DIAGNOSIS — M48.062 SPINAL STENOSIS OF LUMBAR REGION WITH NEUROGENIC CLAUDICATION: Primary | ICD-10-CM

## 2023-12-11 ENCOUNTER — OFFICE VISIT (OUTPATIENT)
Dept: PAIN MEDICINE | Facility: CLINIC | Age: 68
End: 2023-12-11
Payer: MEDICARE

## 2023-12-11 VITALS
BODY MASS INDEX: 33.39 KG/M2 | RESPIRATION RATE: 18 BRPM | SYSTOLIC BLOOD PRESSURE: 154 MMHG | OXYGEN SATURATION: 98 % | HEART RATE: 67 BPM | WEIGHT: 182.56 LBS | DIASTOLIC BLOOD PRESSURE: 94 MMHG | TEMPERATURE: 98 F

## 2023-12-11 DIAGNOSIS — M48.062 SPINAL STENOSIS OF LUMBAR REGION WITH NEUROGENIC CLAUDICATION: Primary | ICD-10-CM

## 2023-12-11 DIAGNOSIS — M51.36 DDD (DEGENERATIVE DISC DISEASE), LUMBAR: ICD-10-CM

## 2023-12-11 DIAGNOSIS — M79.18 MYOFASCIAL PAIN SYNDROME: Primary | ICD-10-CM

## 2023-12-11 DIAGNOSIS — M48.062 SPINAL STENOSIS OF LUMBAR REGION WITH NEUROGENIC CLAUDICATION: ICD-10-CM

## 2023-12-11 PROCEDURE — 1101F PT FALLS ASSESS-DOCD LE1/YR: CPT | Mod: CPTII,S$GLB,, | Performed by: STUDENT IN AN ORGANIZED HEALTH CARE EDUCATION/TRAINING PROGRAM

## 2023-12-11 PROCEDURE — 3080F DIAST BP >= 90 MM HG: CPT | Mod: CPTII,S$GLB,, | Performed by: STUDENT IN AN ORGANIZED HEALTH CARE EDUCATION/TRAINING PROGRAM

## 2023-12-11 PROCEDURE — 1101F PR PT FALLS ASSESS DOC 0-1 FALLS W/OUT INJ PAST YR: ICD-10-PCS | Mod: CPTII,S$GLB,, | Performed by: STUDENT IN AN ORGANIZED HEALTH CARE EDUCATION/TRAINING PROGRAM

## 2023-12-11 PROCEDURE — 4010F PR ACE/ARB THEARPY RXD/TAKEN: ICD-10-PCS | Mod: CPTII,S$GLB,, | Performed by: STUDENT IN AN ORGANIZED HEALTH CARE EDUCATION/TRAINING PROGRAM

## 2023-12-11 PROCEDURE — 1159F MED LIST DOCD IN RCRD: CPT | Mod: CPTII,S$GLB,, | Performed by: STUDENT IN AN ORGANIZED HEALTH CARE EDUCATION/TRAINING PROGRAM

## 2023-12-11 PROCEDURE — 99999 PR PBB SHADOW E&M-EST. PATIENT-LVL V: CPT | Mod: PBBFAC,,, | Performed by: STUDENT IN AN ORGANIZED HEALTH CARE EDUCATION/TRAINING PROGRAM

## 2023-12-11 PROCEDURE — 3008F PR BODY MASS INDEX (BMI) DOCUMENTED: ICD-10-PCS | Mod: CPTII,S$GLB,, | Performed by: STUDENT IN AN ORGANIZED HEALTH CARE EDUCATION/TRAINING PROGRAM

## 2023-12-11 PROCEDURE — 1125F PR PAIN SEVERITY QUANTIFIED, PAIN PRESENT: ICD-10-PCS | Mod: CPTII,S$GLB,, | Performed by: STUDENT IN AN ORGANIZED HEALTH CARE EDUCATION/TRAINING PROGRAM

## 2023-12-11 PROCEDURE — 1160F RVW MEDS BY RX/DR IN RCRD: CPT | Mod: CPTII,S$GLB,, | Performed by: STUDENT IN AN ORGANIZED HEALTH CARE EDUCATION/TRAINING PROGRAM

## 2023-12-11 PROCEDURE — 4010F ACE/ARB THERAPY RXD/TAKEN: CPT | Mod: CPTII,S$GLB,, | Performed by: STUDENT IN AN ORGANIZED HEALTH CARE EDUCATION/TRAINING PROGRAM

## 2023-12-11 PROCEDURE — 3044F HG A1C LEVEL LT 7.0%: CPT | Mod: CPTII,S$GLB,, | Performed by: STUDENT IN AN ORGANIZED HEALTH CARE EDUCATION/TRAINING PROGRAM

## 2023-12-11 PROCEDURE — 3044F PR MOST RECENT HEMOGLOBIN A1C LEVEL <7.0%: ICD-10-PCS | Mod: CPTII,S$GLB,, | Performed by: STUDENT IN AN ORGANIZED HEALTH CARE EDUCATION/TRAINING PROGRAM

## 2023-12-11 PROCEDURE — 3077F PR MOST RECENT SYSTOLIC BLOOD PRESSURE >= 140 MM HG: ICD-10-PCS | Mod: CPTII,S$GLB,, | Performed by: STUDENT IN AN ORGANIZED HEALTH CARE EDUCATION/TRAINING PROGRAM

## 2023-12-11 PROCEDURE — 1159F PR MEDICATION LIST DOCUMENTED IN MEDICAL RECORD: ICD-10-PCS | Mod: CPTII,S$GLB,, | Performed by: STUDENT IN AN ORGANIZED HEALTH CARE EDUCATION/TRAINING PROGRAM

## 2023-12-11 PROCEDURE — 3008F BODY MASS INDEX DOCD: CPT | Mod: CPTII,S$GLB,, | Performed by: STUDENT IN AN ORGANIZED HEALTH CARE EDUCATION/TRAINING PROGRAM

## 2023-12-11 PROCEDURE — 1125F AMNT PAIN NOTED PAIN PRSNT: CPT | Mod: CPTII,S$GLB,, | Performed by: STUDENT IN AN ORGANIZED HEALTH CARE EDUCATION/TRAINING PROGRAM

## 2023-12-11 PROCEDURE — 3288F PR FALLS RISK ASSESSMENT DOCUMENTED: ICD-10-PCS | Mod: CPTII,S$GLB,, | Performed by: STUDENT IN AN ORGANIZED HEALTH CARE EDUCATION/TRAINING PROGRAM

## 2023-12-11 PROCEDURE — 3080F PR MOST RECENT DIASTOLIC BLOOD PRESSURE >= 90 MM HG: ICD-10-PCS | Mod: CPTII,S$GLB,, | Performed by: STUDENT IN AN ORGANIZED HEALTH CARE EDUCATION/TRAINING PROGRAM

## 2023-12-11 PROCEDURE — 3077F SYST BP >= 140 MM HG: CPT | Mod: CPTII,S$GLB,, | Performed by: STUDENT IN AN ORGANIZED HEALTH CARE EDUCATION/TRAINING PROGRAM

## 2023-12-11 PROCEDURE — 99204 PR OFFICE/OUTPT VISIT, NEW, LEVL IV, 45-59 MIN: ICD-10-PCS | Mod: S$GLB,,, | Performed by: STUDENT IN AN ORGANIZED HEALTH CARE EDUCATION/TRAINING PROGRAM

## 2023-12-11 PROCEDURE — 99204 OFFICE O/P NEW MOD 45 MIN: CPT | Mod: S$GLB,,, | Performed by: STUDENT IN AN ORGANIZED HEALTH CARE EDUCATION/TRAINING PROGRAM

## 2023-12-11 PROCEDURE — 99999 PR PBB SHADOW E&M-EST. PATIENT-LVL V: ICD-10-PCS | Mod: PBBFAC,,, | Performed by: STUDENT IN AN ORGANIZED HEALTH CARE EDUCATION/TRAINING PROGRAM

## 2023-12-11 PROCEDURE — 1160F PR REVIEW ALL MEDS BY PRESCRIBER/CLIN PHARMACIST DOCUMENTED: ICD-10-PCS | Mod: CPTII,S$GLB,, | Performed by: STUDENT IN AN ORGANIZED HEALTH CARE EDUCATION/TRAINING PROGRAM

## 2023-12-11 PROCEDURE — 3288F FALL RISK ASSESSMENT DOCD: CPT | Mod: CPTII,S$GLB,, | Performed by: STUDENT IN AN ORGANIZED HEALTH CARE EDUCATION/TRAINING PROGRAM

## 2023-12-11 RX ORDER — METHOCARBAMOL 500 MG/1
500 TABLET, FILM COATED ORAL 3 TIMES DAILY PRN
Qty: 90 TABLET | Refills: 2 | Status: SHIPPED | OUTPATIENT
Start: 2023-12-11

## 2023-12-11 NOTE — PROGRESS NOTES
Chronic Pain - New Consult    Referring Physician: Olga Duncan MD    Chief Complaint:   Chief Complaint   Patient presents with    Hip Pain     Knot like pain. Going through out the buttocks.         SUBJECTIVE:    Riley Marcus presents to the clinic for the evaluation of lower back and bilateral buttocks pain. The pain started many years ago following no inciting event and symptoms have been worsening.The pain is located in the lower back area and radiates to the bilateral buttocks.  The pain is described as sharp and is rated as 8/10. The pain is rated with a score of  5/10 on the BEST day and a score of 8/10 on the WORST day.  Symptoms interfere with daily activity. The pain is exacerbated by walking, kneeling (putting close together), standing.  The pain is mitigated by moving legs far apart. The patient reports 7-8 hours of uninterrupted sleep per night.    Patient denies urinary incontinence, bowel incontinence, and significant motor weakness. She had a caudal MELL in 2015 which she didn't find beneficial    Physical Therapy/Home Exercise: yes, completed in October 2023. Continuing to do home exercise program.      Pain Disability Index Review:      12/11/2023     8:15 AM   Last 3 PDI Scores   Pain Disability Index (PDI) 56       Pain Medications:   - tylenol extended release   - ibuprofen     report:  Reviewed and consistent with medication use as prescribed.  03/11/2022 03/11/2022 1 Oxycodone-Acetaminophn 7.5-325 16.00 4 [facility]       Pain Procedures:   11/23/2015: Caudal MELL ([place])    Imaging:   MRI LUMBAR SPINE WITHOUT CONTRAST     CLINICAL HISTORY:  Low back pain, symptoms persist with > 6wks conservative treatment; Lumbago with sciatica, right side     TECHNIQUE:  Multiplanar, multisequence MR images were acquired from the thoracolumbar junction to the sacrum without the administration of contrast.     COMPARISON:  10/21/2019     FINDINGS:  Alignment: Grade 1 anterolisthesis of L3-4.      Vertebrae: 5 lumbar-type vertebral bodies. No aggressive marrow replacement process or fracture.Nobone marrow edema .     Discs: Degenerative changes L2-L5 disc space levels with disc space narrowing L4-5.     Cord: Normal. Conus terminates at L1.     Degenerative findings:     T12-L1: There is no focal disc herniation. No significant central canal narrowing . No significant neural foraminal narrowing.     L1-L2: There is no focal disc herniation. No significant central canal narrowing . No significant neural foraminal narrowing.     L2-L3: There is no focal disc herniation.Moderate hypertrophic changes of facets/ligamentum flavum thickening. Mild central canal narrowing . No significant neural foraminal narrowing.     L3-L4: There is no focal disc herniation.Broad-based disc bulge, facet degenerative change and ligamentum flavum thickening which contribute to  severe central canal narrowing .  Moderate right and mild left neural foraminal narrowing.     L4-L5: There is no focal disc herniation.Broad-based disc bulge, facet degenerative change and ligamentum flavum thickening which contribute to  severe central canal narrowing . Moderate right and mild left neural foraminal narrowing.     L5-S1: There is no focal disc herniation.Mild hypertrophic changes of facets/ligamentum flavum thickening. Mild central canal narrowing . No significant neural foraminal narrowing.     Paraspinal muscles & soft tissues: Unremarkable.     Impression:     Lumbar spondylosis L2-L5 worst at L3-4 and L4-5.        Electronically signed by: De Ross MD  Date:                                            11/30/2023  Time:                                           09:36    Past Medical History:   Diagnosis Date    Arthritis     Blood transfusion     Chronic midline low back pain with bilateral sciatica 9/8/2023    Corneal abrasion 20 yrs    ? eye due to cls     GERD (gastroesophageal reflux disease)     Hyperlipidemia      Hypertension      Past Surgical History:   Procedure Laterality Date    Breast reduction      BREAST SURGERY      COLONOSCOPY N/A 2022    Procedure: COLONOSCOPY;  Surgeon: Chanda Hawkins MD;  Location: [facility] ENDO;  Service: Endoscopy;  Laterality: N/A;    ESOPHAGOGASTRODUODENOSCOPY N/A 2022    Procedure: ESOPHAGOGASTRODUODENOSCOPY (EGD);  Surgeon: Chanda Hawkins MD;  Location: [facility] ENDO;  Service: Endoscopy;  Laterality: N/A;    EYE SURGERY      Lasik bilat eyes    HYSTERECTOMY      LASER LAPAROSCOPY      MINI-LAPAROTOMY      OOPHORECTOMY      TOTAL REDUCTION MAMMOPLASTY       Social History     Socioeconomic History    Marital status:     Number of children: 0   Occupational History    Occupation: RN     Employer: [employer]   Tobacco Use    Smoking status: Never     Passive exposure: Never    Smokeless tobacco: Never   Substance and Sexual Activity    Alcohol use: Yes     Comment: RARELY    Drug use: No    Sexual activity: Not Currently     Partners: Male   Social History Narrative      several years ago    She has not been sexually-active since    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 [facility]     Family History   Problem Relation Age of Onset    Arthritis Mother     Heart disease Mother     Hypertension Mother     Cataracts Mother     Heart disease Father     Hypertension Father     Stroke Father     Diabetes Sister     Hypertension Sister     Hypertension Sister     Eczema Other     Arthritis Maternal Grandmother     Heart disease Maternal Grandmother     Hypertension Maternal Grandmother     Arthritis Maternal Grandfather     Arthritis Paternal Grandmother     Heart disease Paternal Grandmother     Arthritis Paternal Grandfather     No Known Problems Brother     No Known Problems Maternal Aunt     No Known Problems Maternal Uncle     No Known Problems Paternal Aunt     No Known Problems Paternal Uncle     Ovarian cancer  Neg Hx     Breast cancer Neg Hx     Anxiety disorder Neg Hx     Depression Neg Hx     Suicide Neg Hx     Amblyopia Neg Hx     Blindness Neg Hx     Cancer Neg Hx     Glaucoma Neg Hx     Macular degeneration Neg Hx     Retinal detachment Neg Hx     Strabismus Neg Hx     Thyroid disease Neg Hx     Colon cancer Neg Hx     Esophageal cancer Neg Hx        Review of patient's allergies indicates:   Allergen Reactions    Sulfa (sulfonamide antibiotics) Itching and Rash     Other reaction(s): Rash       Current Outpatient Medications   Medication Sig    amLODIPine (NORVASC) 5 MG tablet Take 1 tablet (5 mg total) by mouth once daily.    apple cider vinegar 500 mg Tab     ascorbic acid, vitamin C, (VITAMIN C) 500 MG tablet Take 500 mg by mouth once daily.    aspirin 81 mg Tab Take by mouth. 1 Tablet Oral Every day    B-complex with vitamin C (Z-BEC OR EQUIV) tablet     biotin 1,000 mcg Chew     ciclopirox (PENLAC) 8 % Soln Apply topically nightly.    colchicine (COLCRYS) 0.6 mg tablet Take 2 tablets by mouth, then take 1 tablet by mouth 1 hour later; okay to repeat in 24 hours if warranted    docusate sodium (COLACE) 100 MG capsule Take 100 mg by mouth once daily.    ezetimibe (ZETIA) 10 mg tablet Take 1 tablet (10 mg total) by mouth once daily.    GLUC MAURICE/CHONDRO MAURICE A/VIT C/MN (GLUCOSAMINE-CHONDROIT-VIT C-MN) 182-898-93-5 mg Tab Take by mouth. 1 Tablet Oral Every day    green tea leaf extract 315 mg Cap Take by mouth. 1 Capsule Oral Every day    hydrALAZINE (APRESOLINE) 25 MG tablet Take 1 tablet (25 mg total) by mouth every 12 (twelve) hours.    lisinopriL (PRINIVIL,ZESTRIL) 40 MG tablet Take 1 tablet (40 mg total) by mouth once daily.    metoprolol succinate (TOPROL-XL) 50 MG 24 hr tablet Take 1 tablet (50 mg total) by mouth once daily.    omega-3 fatty acids 1,000 mg Cap Take by mouth. 1 Capsule Oral Every day    pantoprazole (PROTONIX) 40 MG tablet Take 1 tablet (40 mg total) by mouth once daily. Take 30 minutes before  breakfast.    UNABLE TO FIND medication name: Tumeric 1000 mg daily    VITAMIN E,DL-ALPHA TOCOPHEROL, (VITAMIN E, BULK, MISC) by Misc.(Non-Drug; Combo Route) route.    methocarbamoL (ROBAXIN) 500 MG Tab Take 1 tablet (500 mg total) by mouth 3 (three) times daily as needed.     No current facility-administered medications for this visit.       REVIEW OF SYSTEMS:    GENERAL:  No weight loss, malaise or fevers.  HEENT:  Negative for frequent or significant headaches.  NECK:  Negative for lumps, goiter, pain and significant neck swelling.  RESPIRATORY:  Negative for cough, wheezing or shortness of breath.  CARDIOVASCULAR:  Negative for chest pain, leg swelling or palpitations.  GI:  Negative for abdominal discomfort, blood in stools or black stools or change in bowel habits.  MUSCULOSKELETAL:  See HPI.  SKIN:  Negative for lesions, rash, and itching.  PSYCH:  Negative for sleep disturbance, mood disorder and recent psychosocial stressors.  HEMATOLOGY/LYMPHOLOGY:  Negative for prolonged bleeding, bruising easily or swollen nodes. +ASA 81mg  NEURO:   No history of headaches, syncope, paralysis, seizures or tremors.  All other reviewed and negative other than HPI.    CMP  Sodium   Date Value Ref Range Status   11/21/2023 142 135 - 146 mmol/L Final     Potassium   Date Value Ref Range Status   11/21/2023 4.2 3.5 - 5.3 mmol/L Final     Chloride   Date Value Ref Range Status   11/21/2023 101 98 - 110 mmol/L Final     CO2   Date Value Ref Range Status   11/21/2023 31 20 - 32 mmol/L Final     Glucose   Date Value Ref Range Status   11/21/2023 107 (H) 65 - 99 mg/dL Final     Comment:                   Fasting reference interval     For someone without known diabetes, a glucose value  between 100 and 125 mg/dL is consistent with  prediabetes and should be confirmed with a  follow-up test.          BUN   Date Value Ref Range Status   11/21/2023 28 (H) 7 - 25 mg/dL Final     Creatinine   Date Value Ref Range Status   11/21/2023  1.79 (H) 0.50 - 1.05 mg/dL Final     Calcium   Date Value Ref Range Status   11/21/2023 10.6 (H) 8.6 - 10.4 mg/dL Final     Total Protein   Date Value Ref Range Status   11/21/2023 7.1 6.1 - 8.1 g/dL Final     Albumin   Date Value Ref Range Status   11/21/2023 4.1 3.6 - 5.1 g/dL Final     Total Bilirubin   Date Value Ref Range Status   11/21/2023 0.5 0.2 - 1.2 mg/dL Final     Alkaline Phosphatase   Date Value Ref Range Status   02/07/2023 61 55 - 135 U/L Final     AST   Date Value Ref Range Status   11/21/2023 20 10 - 35 U/L Final     ALT   Date Value Ref Range Status   11/21/2023 19 6 - 29 U/L Final     Anion Gap   Date Value Ref Range Status   02/07/2023 9 8 - 16 mmol/L Final     eGFR   Date Value Ref Range Status   11/21/2023 31 (L) > OR = 60 mL/min/1.73m2 Final         OBJECTIVE:    BP (!) 154/94   Pulse 67   Temp 98.2 °F (36.8 °C)   Resp 18   Wt 82.8 kg (182 lb 8.7 oz)   SpO2 98%   BMI 33.39 kg/m²     PHYSICAL EXAMINATION:  General appearance: Well appearing, in no acute distress, alert and appropriately communicative.  Psych:  Mood and affect appropriate.  Skin: Skin color, texture, turgor normal, no rashes or lesions, in both upper and lower body.  Head/face:  Atraumatic, normocephalic.  Cor: regular rate  Pulm: non-labored breathing  GI: Abdomen non-distended and non-tender.  Back: Straight leg raising in the sitting and supine positions is negative to radicular pain. No pain to palpation over the spine or paraspinal muscles. Normal range of motion without pain reproduction.  Extremities: Peripheral joint ROM is full and pain free without obvious instability or laxity in all four extremities. No deformities, edema, or skin discoloration. Good capillary refill.  Musculoskeletal: hip, sacroiliac and knee provocative maneuvers are negative with the exception of piriformis tenderness and pain with lateral bend of bilateral legs while prone. Bilateral upper and lower extremity strength is normal and  symmetric.  No atrophy or tone abnormalities are noted.  Neuro: Bilateral upper and lower extremity coordination and muscle stretch reflexes are physiologic and symmetric.  Negative Clonus. No loss of sensation is noted.  Gait: Normal.    ASSESSMENT: 68 y.o. year old female with buttock pain, consistent with:     1. Myofascial pain syndrome  methocarbamoL (ROBAXIN) 500 MG Tab    Procedure Order to Pain Management      2. Spinal stenosis of lumbar region with neurogenic claudication  Ambulatory referral/consult to Pain Clinic    methocarbamoL (ROBAXIN) 500 MG Tab    Procedure Order to Pain Management      3. DDD (degenerative disc disease), lumbar          IMPRESSION: Ms. Marcus presents for bilateral buttock pain.  She has had this pain for many years and has done many rounds of physical therapy without significant improvement.  She previously had pain radiating past her knees to her feet, however this has improved with physical therapy and time.  History and physical exam are consistent with lumbar radiculopathy and myofascial pain syndrome.  Specifically, her findings are consistent with bilateral piriformis syndrome.  Imaging is consistent with lumbar degenerative disc disease.  As she has failed conservative management with physical therapy and some medication management, she is a candidate for interventions which may help her.    PLAN:   - I have stressed the importance of physical activity and a home exercise plan to help with pain and improve health.  - Patient can continue with medications for now since they are providing benefits, using them appropriately, and without side effects.  - start robaxin 500mg every 8 hours as needed for muscular pain  - schedule for bilateral piriformis injection  - RTC after piriformis injection; she may be a candidate for alternative interventions such as lumbar epidural steroid injections, if the piriformis injection does not improve her symptoms  - Counseled patient regarding  the importance of activity modification and physical therapy.    The above plan and management options were discussed at length with patient. Patient is in agreement with the above and verbalized understanding. It will be communicated with the referring physician via electronic record, fax, or mail.    Raj Pierre  12/11/2023

## 2023-12-18 ENCOUNTER — PATIENT MESSAGE (OUTPATIENT)
Dept: PAIN MEDICINE | Facility: OTHER | Age: 68
End: 2023-12-18

## 2024-01-17 ENCOUNTER — LAB VISIT (OUTPATIENT)
Dept: LAB | Facility: HOSPITAL | Age: 69
End: 2024-01-17
Payer: MEDICARE

## 2024-01-17 DIAGNOSIS — I10 PRIMARY HYPERTENSION: ICD-10-CM

## 2024-01-17 DIAGNOSIS — D64.9 ANEMIA, UNSPECIFIED TYPE: ICD-10-CM

## 2024-01-17 DIAGNOSIS — E55.9 VITAMIN D DEFICIENCY: ICD-10-CM

## 2024-01-17 DIAGNOSIS — N18.30 STAGE 3 CHRONIC KIDNEY DISEASE, UNSPECIFIED WHETHER STAGE 3A OR 3B CKD: ICD-10-CM

## 2024-01-17 LAB
ALBUMIN SERPL BCP-MCNC: 3.7 G/DL (ref 3.5–5.2)
ALP SERPL-CCNC: 72 U/L (ref 55–135)
ALT SERPL W/O P-5'-P-CCNC: 39 U/L (ref 10–44)
ANION GAP SERPL CALC-SCNC: 7 MMOL/L (ref 8–16)
AST SERPL-CCNC: 26 U/L (ref 10–40)
BACTERIA #/AREA URNS HPF: ABNORMAL /HPF
BASOPHILS # BLD AUTO: 0.04 K/UL (ref 0–0.2)
BASOPHILS NFR BLD: 0.5 % (ref 0–1.9)
BILIRUB SERPL-MCNC: 0.6 MG/DL (ref 0.1–1)
BILIRUB UR QL STRIP: NEGATIVE
BUN SERPL-MCNC: 22 MG/DL (ref 8–23)
CALCIUM SERPL-MCNC: 10 MG/DL (ref 8.7–10.5)
CHLORIDE SERPL-SCNC: 106 MMOL/L (ref 95–110)
CLARITY UR: CLEAR
CO2 SERPL-SCNC: 26 MMOL/L (ref 23–29)
COLOR UR: YELLOW
CREAT SERPL-MCNC: 1.5 MG/DL (ref 0.5–1.4)
CREAT UR-MCNC: 294.1 MG/DL (ref 15–325)
DIFFERENTIAL METHOD BLD: ABNORMAL
EOSINOPHIL # BLD AUTO: 0.1 K/UL (ref 0–0.5)
EOSINOPHIL NFR BLD: 1.2 % (ref 0–8)
ERYTHROCYTE [DISTWIDTH] IN BLOOD BY AUTOMATED COUNT: 12.6 % (ref 11.5–14.5)
EST. GFR  (NO RACE VARIABLE): 38 ML/MIN/1.73 M^2
FERRITIN SERPL-MCNC: 53 NG/ML (ref 20–300)
GLUCOSE SERPL-MCNC: 105 MG/DL (ref 70–110)
GLUCOSE UR QL STRIP: NEGATIVE
HCT VFR BLD AUTO: 39.5 % (ref 37–48.5)
HGB BLD-MCNC: 12.5 G/DL (ref 12–16)
HGB UR QL STRIP: NEGATIVE
HYALINE CASTS #/AREA URNS LPF: 9 /LPF
IMM GRANULOCYTES # BLD AUTO: 0.03 K/UL (ref 0–0.04)
IMM GRANULOCYTES NFR BLD AUTO: 0.4 % (ref 0–0.5)
IRON SERPL-MCNC: 49 UG/DL (ref 30–160)
KETONES UR QL STRIP: NEGATIVE
LEUKOCYTE ESTERASE UR QL STRIP: NEGATIVE
LYMPHOCYTES # BLD AUTO: 1.1 K/UL (ref 1–4.8)
LYMPHOCYTES NFR BLD: 14.9 % (ref 18–48)
MAGNESIUM SERPL-MCNC: 2 MG/DL (ref 1.6–2.6)
MCH RBC QN AUTO: 29.5 PG (ref 27–31)
MCHC RBC AUTO-ENTMCNC: 31.6 G/DL (ref 32–36)
MCV RBC AUTO: 93 FL (ref 82–98)
MICROSCOPIC COMMENT: ABNORMAL
MONOCYTES # BLD AUTO: 0.7 K/UL (ref 0.3–1)
MONOCYTES NFR BLD: 10 % (ref 4–15)
NEUTROPHILS # BLD AUTO: 5.4 K/UL (ref 1.8–7.7)
NEUTROPHILS NFR BLD: 73 % (ref 38–73)
NITRITE UR QL STRIP: NEGATIVE
NON-SQ EPI CELLS #/AREA URNS HPF: 1 /HPF
NRBC BLD-RTO: 0 /100 WBC
PH UR STRIP: 6 [PH] (ref 5–8)
PHOSPHATE SERPL-MCNC: 3.6 MG/DL (ref 2.7–4.5)
PLATELET # BLD AUTO: 226 K/UL (ref 150–450)
PMV BLD AUTO: 11.4 FL (ref 9.2–12.9)
POTASSIUM SERPL-SCNC: 4.1 MMOL/L (ref 3.5–5.1)
PROT SERPL-MCNC: 7.9 G/DL (ref 6–8.4)
PROT UR QL STRIP: ABNORMAL
PROT UR-MCNC: 143 MG/DL
PROT/CREAT UR: 0.49 MG/G{CREAT} (ref 0–0.2)
PTH-INTACT SERPL-MCNC: 126.5 PG/ML (ref 9–77)
RBC # BLD AUTO: 4.24 M/UL (ref 4–5.4)
RBC #/AREA URNS HPF: 0 /HPF (ref 0–4)
SATURATED IRON: 13 % (ref 20–50)
SODIUM SERPL-SCNC: 139 MMOL/L (ref 136–145)
SP GR UR STRIP: 1.02 (ref 1–1.03)
SQUAMOUS #/AREA URNS HPF: 2 /HPF
TOTAL IRON BINDING CAPACITY: 392 UG/DL (ref 250–450)
TRANSFERRIN SERPL-MCNC: 265 MG/DL (ref 200–375)
URATE SERPL-MCNC: 7.7 MG/DL (ref 2.4–5.7)
URN SPEC COLLECT METH UR: ABNORMAL
UROBILINOGEN UR STRIP-ACNC: NEGATIVE EU/DL
WBC # BLD AUTO: 7.4 K/UL (ref 3.9–12.7)
WBC #/AREA URNS HPF: 0 /HPF (ref 0–5)

## 2024-01-17 PROCEDURE — 83970 ASSAY OF PARATHORMONE: CPT

## 2024-01-17 PROCEDURE — 83735 ASSAY OF MAGNESIUM: CPT

## 2024-01-17 PROCEDURE — 80053 COMPREHEN METABOLIC PANEL: CPT

## 2024-01-17 PROCEDURE — 84550 ASSAY OF BLOOD/URIC ACID: CPT

## 2024-01-17 PROCEDURE — 84100 ASSAY OF PHOSPHORUS: CPT

## 2024-01-17 PROCEDURE — 84156 ASSAY OF PROTEIN URINE: CPT

## 2024-01-17 PROCEDURE — 36415 COLL VENOUS BLD VENIPUNCTURE: CPT

## 2024-01-17 PROCEDURE — 83540 ASSAY OF IRON: CPT

## 2024-01-17 PROCEDURE — 82728 ASSAY OF FERRITIN: CPT

## 2024-01-17 PROCEDURE — 82306 VITAMIN D 25 HYDROXY: CPT

## 2024-01-17 PROCEDURE — 81000 URINALYSIS NONAUTO W/SCOPE: CPT

## 2024-01-17 PROCEDURE — 85025 COMPLETE CBC W/AUTO DIFF WBC: CPT

## 2024-01-17 RX ORDER — HYDRALAZINE HYDROCHLORIDE 25 MG/1
25 TABLET, FILM COATED ORAL EVERY 12 HOURS
Qty: 180 TABLET | Refills: 1 | Status: SHIPPED | OUTPATIENT
Start: 2024-01-17 | End: 2025-01-16

## 2024-01-17 NOTE — TELEPHONE ENCOUNTER
Care Due:                  Date            Visit Type   Department     Provider  --------------------------------------------------------------------------------    Last Visit: None Found      None         None Found  Next Visit: None Scheduled  None         None Found                                                            Last  Test          Frequency    Reason                     Performed    Due Date  --------------------------------------------------------------------------------    Office Visit  15 months..  amLODIPine, colchicine,    Not Found    Overdue                             ezetimibe, hydrALAZINE,                             lisinopriL, metoprolol,                             pantoprazole.............    Lipid Panel.  12 months..  ezetimibe................  02- 02-    Uric Acid...  12 months..  colchicine...............  02- 02-    [facility] Embedded Care Due Messages. Reference number: [id number].   1/17/2024 12:56:05 PM CST

## 2024-01-18 ENCOUNTER — HOSPITAL ENCOUNTER (OUTPATIENT)
Facility: OTHER | Age: 69
Discharge: HOME | End: 2024-01-18
Attending: STUDENT IN AN ORGANIZED HEALTH CARE EDUCATION/TRAINING PROGRAM | Admitting: STUDENT IN AN ORGANIZED HEALTH CARE EDUCATION/TRAINING PROGRAM
Payer: MEDICARE

## 2024-01-18 ENCOUNTER — TELEPHONE (OUTPATIENT)
Dept: SPINE | Facility: CLINIC | Age: 69
End: 2024-01-18
Payer: MEDICARE

## 2024-01-18 VITALS
RESPIRATION RATE: 16 BRPM | OXYGEN SATURATION: 98 % | SYSTOLIC BLOOD PRESSURE: 188 MMHG | DIASTOLIC BLOOD PRESSURE: 90 MMHG | HEART RATE: 77 BPM

## 2024-01-18 DIAGNOSIS — M79.18 MYOFASCIAL PAIN SYNDROME: Primary | ICD-10-CM

## 2024-01-18 DIAGNOSIS — G89.29 CHRONIC PAIN: ICD-10-CM

## 2024-01-18 LAB — 25(OH)D3+25(OH)D2 SERPL-MCNC: 22 NG/ML (ref 30–96)

## 2024-01-18 PROCEDURE — 20552 NJX 1/MLT TRIGGER POINT 1/2: CPT | Mod: 50 | Performed by: STUDENT IN AN ORGANIZED HEALTH CARE EDUCATION/TRAINING PROGRAM

## 2024-01-18 PROCEDURE — 25500020 PHARM REV CODE 255: Performed by: STUDENT IN AN ORGANIZED HEALTH CARE EDUCATION/TRAINING PROGRAM

## 2024-01-18 PROCEDURE — 63600175 PHARM REV CODE 636 W HCPCS: Mod: JZ,JG | Performed by: STUDENT IN AN ORGANIZED HEALTH CARE EDUCATION/TRAINING PROGRAM

## 2024-01-18 PROCEDURE — 20552 NJX 1/MLT TRIGGER POINT 1/2: CPT | Mod: 50,,, | Performed by: STUDENT IN AN ORGANIZED HEALTH CARE EDUCATION/TRAINING PROGRAM

## 2024-01-18 PROCEDURE — 25000003 PHARM REV CODE 250: Performed by: STUDENT IN AN ORGANIZED HEALTH CARE EDUCATION/TRAINING PROGRAM

## 2024-01-18 RX ORDER — BUPIVACAINE HYDROCHLORIDE 2.5 MG/ML
INJECTION, SOLUTION EPIDURAL; INFILTRATION; INTRACAUDAL
Status: DISCONTINUED | OUTPATIENT
Start: 2024-01-18 | End: 2024-01-18 | Stop reason: HOSPADM

## 2024-01-18 RX ORDER — LIDOCAINE HYDROCHLORIDE 20 MG/ML
INJECTION, SOLUTION INFILTRATION; PERINEURAL
Status: DISCONTINUED | OUTPATIENT
Start: 2024-01-18 | End: 2024-01-18 | Stop reason: HOSPADM

## 2024-01-18 RX ORDER — SODIUM CHLORIDE 9 MG/ML
INJECTION, SOLUTION INTRAVENOUS CONTINUOUS
Status: DISCONTINUED | OUTPATIENT
Start: 2024-01-18 | End: 2024-01-18 | Stop reason: HOSPADM

## 2024-01-18 RX ORDER — TRIAMCINOLONE ACETONIDE 40 MG/ML
INJECTION, SUSPENSION INTRA-ARTICULAR; INTRAMUSCULAR
Status: DISCONTINUED | OUTPATIENT
Start: 2024-01-18 | End: 2024-01-18 | Stop reason: HOSPADM

## 2024-01-18 RX ORDER — FENTANYL CITRATE 50 UG/ML
INJECTION, SOLUTION INTRAMUSCULAR; INTRAVENOUS
Status: DISCONTINUED | OUTPATIENT
Start: 2024-01-18 | End: 2024-01-18 | Stop reason: HOSPADM

## 2024-01-18 NOTE — DISCHARGE INSTRUCTIONS

## 2024-01-18 NOTE — TELEPHONE ENCOUNTER
Staff spoke with the pt regarding message sent over from call center, pt stated she wanted to know arrival time of her procedure.pt had procedure this morning.

## 2024-01-18 NOTE — OP NOTE
Piriformis Muscle Injection under Fluoroscopic Guidance    The procedure, risks, benefits, and options were discussed with the patient. There are no contraindications to the procedure. The patent expressed understanding and agreed to the procedure. Informed written consent was obtained prior to the start of the procedure and can be found in the patient's chart.    PATIENT NAME: Riley Marcus   MRN: [id number]     DATE OF PROCEDURE: 01/18/2024    PROCEDURE: Bilateral Piriformis Muscle Injection under Fluoroscopic Guidance    PRE-OP DIAGNOSIS: Spinal stenosis of lumbar region with neurogenic claudication [M48.062]    POST-OP DIAGNOSIS: Same    PHYSICIAN: Raj Pierre MD    ASSISTANTS: None     MEDICATIONS INJECTED: Preservative-free Kenalog 40mg with 7cc of Bupivacaine 0.25%     LOCAL ANESTHETIC INJECTED: Xylocaine 2%     SEDATION: None    ESTIMATED BLOOD LOSS: None    COMPLICATIONS: None    TECHNIQUE: Time-out was performed to identify the patient and procedure to be performed. With the patient laying in a prone position, the surgical area was prepped and draped in the usual sterile fashion using ChloraPrep and a fenestrated drape.The area overlying the right piriformis muscle was identified under fluoroscopy in the AP view. Skin anesthesia was achieved by injecting Lidocaine 2% over the injection sites. A 25 gauge,  3.5 inch spinal quinke needle was then advanced 3 cm inferior and 3 cm lateral to the inferior aspect of the SI joint. Once the piriformis muscle was thought to be encountered, Contrast dye  Omnipaque (240mg/mL) was injected to confirm placement and there was no vascular runoff. Confirmation of spread was identified within the piriformis muscle using AP fluoroscopic views. Then  4 mL of the medication mixture listed above was injected slowly. The needles were removed and bleeding was nil. A sterile dressing was applied. No specimens collected. The patient tolerated the procedure well.     The patient was  monitored after the procedure in the recovery area. They were given post-procedure and discharge instructions to follow at home. The patient was discharged in a stable condition.    aRj Pierre MD

## 2024-01-18 NOTE — DISCHARGE SUMMARY
Discharge Note  Short Stay      SUMMARY     Admit Date: 1/18/2024    Attending Physician: Raj Pierre MD PhD    Discharge Physician: Raj Pierre      Discharge Date: 1/18/2024 8:41 AM    Procedure(s) (LRB):  INJECTION, BILATERAL PIRIFORMIS (Bilateral)    Final Diagnosis: Spinal stenosis of lumbar region with neurogenic claudication [M48.062]    Disposition: Home or self care    Patient Instructions:   Current Discharge Medication List        CONTINUE these medications which have NOT CHANGED    Details   amLODIPine (NORVASC) 5 MG tablet Take 1 tablet (5 mg total) by mouth once daily.  Qty: 90 tablet, Refills: 3    Comments: .  Associated Diagnoses: Primary hypertension      apple cider vinegar 500 mg Tab       ascorbic acid, vitamin C, (VITAMIN C) 500 MG tablet Take 500 mg by mouth once daily.      aspirin 81 mg Tab Take by mouth. 1 Tablet Oral Every day      B-complex with vitamin C (Z-BEC OR EQUIV) tablet       biotin 1,000 mcg Chew       ciclopirox (PENLAC) 8 % Soln Apply topically nightly.  Qty: 6.6 mL, Refills: 3      colchicine (COLCRYS) 0.6 mg tablet Take 2 tablets by mouth, then take 1 tablet by mouth 1 hour later; okay to repeat in 24 hours if warranted  Qty: 6 tablet, Refills: 0    Associated Diagnoses: Idiopathic chronic gout without tophus, unspecified site      docusate sodium (COLACE) 100 MG capsule Take 100 mg by mouth once daily.      ezetimibe (ZETIA) 10 mg tablet Take 1 tablet (10 mg total) by mouth once daily.  Qty: 90 tablet, Refills: 2    Associated Diagnoses: Myalgia due to statin; Pure hypercholesterolemia      GLUC MAURICE/CHONDRO MAURICE A/VIT C/MN (GLUCOSAMINE-CHONDROIT-VIT C-MN) 702-801-55-5 mg Tab Take by mouth. 1 Tablet Oral Every day      green tea leaf extract 315 mg Cap Take by mouth. 1 Capsule Oral Every day      hydrALAZINE (APRESOLINE) 25 MG tablet Take 1 tablet (25 mg total) by mouth every 12 (twelve) hours.  Qty: 180 tablet, Refills: 1    Comments: .  Associated Diagnoses: Primary  hypertension      lisinopriL (PRINIVIL,ZESTRIL) 40 MG tablet Take 1 tablet (40 mg total) by mouth once daily.  Qty: 90 tablet, Refills: 0    Comments: .  Associated Diagnoses: Primary hypertension      methocarbamoL (ROBAXIN) 500 MG Tab Take 1 tablet (500 mg total) by mouth 3 (three) times daily as needed.  Qty: 90 tablet, Refills: 2    Associated Diagnoses: Spinal stenosis of lumbar region with neurogenic claudication; Myofascial pain syndrome      metoprolol succinate (TOPROL-XL) 50 MG 24 hr tablet Take 1 tablet (50 mg total) by mouth once daily.  Qty: 90 tablet, Refills: 3    Comments: .  Associated Diagnoses: Primary hypertension      omega-3 fatty acids 1,000 mg Cap Take by mouth. 1 Capsule Oral Every day      pantoprazole (PROTONIX) 40 MG tablet Take 1 tablet (40 mg total) by mouth once daily. Take 30 minutes before breakfast.  Qty: 90 tablet, Refills: 3    Associated Diagnoses: Gastroesophageal reflux disease, unspecified whether esophagitis present      UNABLE TO FIND medication name: Tumeric 1000 mg daily      VITAMIN E,DL-ALPHA TOCOPHEROL, (VITAMIN E, BULK, MISC) by Misc.(Non-Drug; Combo Route) route.                 Discharge Diagnosis: Spinal stenosis of lumbar region with neurogenic claudication [M48.062]  Condition on Discharge: Stable with no complications to procedure   Diet on Discharge: Same as before.  Activity: as per instruction sheet.  Discharge to: Home with a responsible adult.  Follow up: 2-4 weeks       Please call my office or pager at [phone] if experienced any weakness or loss of sensation, fever > 101.5, pain uncontrolled with oral medications, persistent nausea/vomiting/or diarrhea, redness or drainage from the incisions, or any other worrisome concerns. If physician on call was not reached or could not communicate with our office for any reason please go to the nearest emergency department      Raj Pierre MD PhD

## 2024-01-18 NOTE — H&P
HPI  Riley Marcus presents for Procedure(s):  INJECTION, BILATERAL PIRIFORMIS    Recent anticoagulation/antiplatelets reviewed and verified with nursing.  Recent antibiotics/recent infections reviewed and verified with nursing.    Past Medical History:   Diagnosis Date    Arthritis     Blood transfusion     Chronic midline low back pain with bilateral sciatica 9/8/2023    Corneal abrasion 20 yrs    ? eye due to cls     GERD (gastroesophageal reflux disease)     Hyperlipidemia     Hypertension      Past Surgical History:   Procedure Laterality Date    Breast reduction      BREAST SURGERY      COLONOSCOPY N/A 7/20/2022    Procedure: COLONOSCOPY;  Surgeon: Chanda Hawkins MD;  Location: [facility];  Service: Endoscopy;  Laterality: N/A;    ESOPHAGOGASTRODUODENOSCOPY N/A 7/20/2022    Procedure: ESOPHAGOGASTRODUODENOSCOPY (EGD);  Surgeon: Chanda Hawkins MD;  Location: [facility];  Service: Endoscopy;  Laterality: N/A;    EYE SURGERY      Lasik bilat eyes    HYSTERECTOMY      LASER LAPAROSCOPY  1988    MINI-LAPAROTOMY  1988    OOPHORECTOMY      TOTAL REDUCTION MAMMOPLASTY       Review of patient's allergies indicates:   Allergen Reactions    Sulfa (sulfonamide antibiotics) Itching and Rash     Other reaction(s): Rash        PMHx, PSHx, Allergies, Medications reviewed in epic      ROS negative except pain complaints in HPI    OBJECTIVE:    Breastfeeding No     PHYSICAL EXAMINATION:    GENERAL: Well appearing, in no acute distress, alert and oriented to name, situation, location.  PSYCH:  Mood and affect appropriate.  SKIN: Skin color, texture, turgor normal, no rashes or lesions at site of procedure.  CV: regular rate with palpation of the radial artery.  PULM: No evidence of respiratory difficulty, symmetric chest rise. No audible abnormal respiratory sounds.  NEURO: Cranial nerves grossly intact.    Plan:    Proceed with procedure as planned    Raj Pierre  01/18/2024

## 2024-01-18 NOTE — TELEPHONE ENCOUNTER
----- Message from Suhail Ching sent at 1/17/2024  4:52 PM CST -----   Name of Who is Calling:     What is the request in detail: patient request call back in reference to verify procedure  arrival time    Please contact to further discuss and advise      Can the clinic reply by [facility]:     [facility]   [phone]

## 2024-01-23 PROBLEM — N18.32 STAGE 3B CHRONIC KIDNEY DISEASE: Status: ACTIVE | Noted: 2024-01-23

## 2024-01-23 PROBLEM — M10.9 GOUT: Status: ACTIVE | Noted: 2024-01-23

## 2024-01-23 PROBLEM — N25.81 SECONDARY HYPERPARATHYROIDISM OF RENAL ORIGIN: Status: ACTIVE | Noted: 2024-01-23

## 2024-01-23 PROBLEM — E79.0 HYPERURICEMIA: Status: ACTIVE | Noted: 2024-01-23

## 2024-01-23 PROBLEM — E55.9 VITAMIN D DEFICIENCY: Status: ACTIVE | Noted: 2024-01-23

## 2024-01-23 PROBLEM — R80.9 MICROALBUMINURIA: Status: ACTIVE | Noted: 2024-01-23

## 2024-02-23 ENCOUNTER — LAB VISIT (OUTPATIENT)
Dept: LAB | Facility: HOSPITAL | Age: 69
End: 2024-02-23
Payer: MEDICARE

## 2024-02-23 DIAGNOSIS — N25.81 SECONDARY HYPERPARATHYROIDISM OF RENAL ORIGIN: ICD-10-CM

## 2024-02-23 DIAGNOSIS — Z11.4 ENCOUNTER FOR SCREENING FOR HIV: ICD-10-CM

## 2024-02-23 DIAGNOSIS — N18.32 STAGE 3B CHRONIC KIDNEY DISEASE: ICD-10-CM

## 2024-02-23 DIAGNOSIS — E55.9 VITAMIN D DEFICIENCY: ICD-10-CM

## 2024-02-23 DIAGNOSIS — R80.9 MICROALBUMINURIA: ICD-10-CM

## 2024-02-23 DIAGNOSIS — E79.0 HYPERURICEMIA: ICD-10-CM

## 2024-02-23 DIAGNOSIS — Z01.89 ENCOUNTER FOR SPECIAL EXAMINATION: ICD-10-CM

## 2024-02-23 DIAGNOSIS — M10.9 GOUT, UNSPECIFIED CAUSE, UNSPECIFIED CHRONICITY, UNSPECIFIED SITE: ICD-10-CM

## 2024-02-23 DIAGNOSIS — I10 PRIMARY HYPERTENSION: ICD-10-CM

## 2024-02-23 LAB
ANION GAP SERPL CALC-SCNC: 9 MMOL/L (ref 8–16)
BUN SERPL-MCNC: 21 MG/DL (ref 8–23)
CALCIUM SERPL-MCNC: 9.9 MG/DL (ref 8.7–10.5)
CHLORIDE SERPL-SCNC: 106 MMOL/L (ref 95–110)
CO2 SERPL-SCNC: 29 MMOL/L (ref 23–29)
CREAT SERPL-MCNC: 1.9 MG/DL (ref 0.5–1.4)
EST. GFR  (NO RACE VARIABLE): 29 ML/MIN/1.73 M^2
GLUCOSE SERPL-MCNC: 112 MG/DL (ref 70–110)
POTASSIUM SERPL-SCNC: 3.8 MMOL/L (ref 3.5–5.1)
SODIUM SERPL-SCNC: 144 MMOL/L (ref 136–145)

## 2024-02-23 PROCEDURE — 36415 COLL VENOUS BLD VENIPUNCTURE: CPT

## 2024-02-23 PROCEDURE — 80048 BASIC METABOLIC PNL TOTAL CA: CPT

## 2024-02-26 ENCOUNTER — OFFICE VISIT (OUTPATIENT)
Dept: PAIN MEDICINE | Facility: CLINIC | Age: 69
End: 2024-02-26
Payer: MEDICARE

## 2024-02-26 ENCOUNTER — PATIENT MESSAGE (OUTPATIENT)
Dept: PAIN MEDICINE | Facility: OTHER | Age: 69
End: 2024-02-26
Payer: MEDICARE

## 2024-02-26 VITALS
RESPIRATION RATE: 18 BRPM | TEMPERATURE: 98 F | OXYGEN SATURATION: 100 % | WEIGHT: 174.19 LBS | HEIGHT: 62 IN | SYSTOLIC BLOOD PRESSURE: 140 MMHG | HEART RATE: 73 BPM | BODY MASS INDEX: 32.05 KG/M2 | DIASTOLIC BLOOD PRESSURE: 89 MMHG

## 2024-02-26 DIAGNOSIS — M70.60 GREATER TROCHANTERIC BURSITIS, UNSPECIFIED LATERALITY: ICD-10-CM

## 2024-02-26 DIAGNOSIS — M53.3 SACROILIAC JOINT PAIN: Primary | ICD-10-CM

## 2024-02-26 PROCEDURE — 1101F PT FALLS ASSESS-DOCD LE1/YR: CPT | Mod: CPTII,S$GLB,, | Performed by: STUDENT IN AN ORGANIZED HEALTH CARE EDUCATION/TRAINING PROGRAM

## 2024-02-26 PROCEDURE — 3077F SYST BP >= 140 MM HG: CPT | Mod: CPTII,S$GLB,, | Performed by: STUDENT IN AN ORGANIZED HEALTH CARE EDUCATION/TRAINING PROGRAM

## 2024-02-26 PROCEDURE — 3079F DIAST BP 80-89 MM HG: CPT | Mod: CPTII,S$GLB,, | Performed by: STUDENT IN AN ORGANIZED HEALTH CARE EDUCATION/TRAINING PROGRAM

## 2024-02-26 PROCEDURE — 3288F FALL RISK ASSESSMENT DOCD: CPT | Mod: CPTII,S$GLB,, | Performed by: STUDENT IN AN ORGANIZED HEALTH CARE EDUCATION/TRAINING PROGRAM

## 2024-02-26 PROCEDURE — 3008F BODY MASS INDEX DOCD: CPT | Mod: CPTII,S$GLB,, | Performed by: STUDENT IN AN ORGANIZED HEALTH CARE EDUCATION/TRAINING PROGRAM

## 2024-02-26 PROCEDURE — 1160F RVW MEDS BY RX/DR IN RCRD: CPT | Mod: CPTII,S$GLB,, | Performed by: STUDENT IN AN ORGANIZED HEALTH CARE EDUCATION/TRAINING PROGRAM

## 2024-02-26 PROCEDURE — 1125F AMNT PAIN NOTED PAIN PRSNT: CPT | Mod: CPTII,S$GLB,, | Performed by: STUDENT IN AN ORGANIZED HEALTH CARE EDUCATION/TRAINING PROGRAM

## 2024-02-26 PROCEDURE — 3066F NEPHROPATHY DOC TX: CPT | Mod: CPTII,S$GLB,, | Performed by: STUDENT IN AN ORGANIZED HEALTH CARE EDUCATION/TRAINING PROGRAM

## 2024-02-26 PROCEDURE — 99214 OFFICE O/P EST MOD 30 MIN: CPT | Mod: S$GLB,,, | Performed by: STUDENT IN AN ORGANIZED HEALTH CARE EDUCATION/TRAINING PROGRAM

## 2024-02-26 PROCEDURE — 1159F MED LIST DOCD IN RCRD: CPT | Mod: CPTII,S$GLB,, | Performed by: STUDENT IN AN ORGANIZED HEALTH CARE EDUCATION/TRAINING PROGRAM

## 2024-02-26 PROCEDURE — 4010F ACE/ARB THERAPY RXD/TAKEN: CPT | Mod: CPTII,S$GLB,, | Performed by: STUDENT IN AN ORGANIZED HEALTH CARE EDUCATION/TRAINING PROGRAM

## 2024-02-26 PROCEDURE — 99999 PR PBB SHADOW E&M-EST. PATIENT-LVL III: CPT | Mod: PBBFAC,,, | Performed by: STUDENT IN AN ORGANIZED HEALTH CARE EDUCATION/TRAINING PROGRAM

## 2024-02-26 RX ORDER — LIDOCAINE 50 MG/G
1 PATCH TOPICAL DAILY
Qty: 30 PATCH | Refills: 2 | Status: SHIPPED | OUTPATIENT
Start: 2024-02-26

## 2024-02-26 NOTE — PROGRESS NOTES
Chronic patient Established Note (Follow up visit)      SUBJECTIVE:    INTERVAL HISTORY 2/26/2024:  Riley Marcus presents to the clinic for a follow-up appointment for chronic pain. Current pain intensity is 8/10.  Since the last visit, Riley Marcus states:  after her piriformis injection, she had 100% pain relief which lasted 3 weeks.  She however states that the pain has returned, and is worse in the morning.  It tends to feel better as the day progresses.  She continues to go to the gym 3 times a week and actively participates in a home exercise program to help her pain.      PRIOR HISTORY:   Riley Marcus presents to the clinic for the evaluation of lower back and bilateral buttocks pain. The pain started many years ago following no inciting event and symptoms have been worsening.The pain is located in the lower back area and radiates to the bilateral buttocks.  The pain is described as sharp and is rated as 8/10. The pain is rated with a score of  5/10 on the BEST day and a score of 8/10 on the WORST day.  Symptoms interfere with daily activity. The pain is exacerbated by walking, kneeling (putting close together), standing.  The pain is mitigated by moving legs far apart. The patient reports 7-8 hours of uninterrupted sleep per night.    Patient denies urinary incontinence, bowel incontinence, and significant motor weakness. She had a caudal MELL in 2015 which she didn't find beneficial    Physical Therapy/Home Exercise: yes, completed in October 2023. Continuing to do home exercise program.      Pain Disability Index Review:      12/11/2023     8:15 AM   Last 3 PDI Scores   Pain Disability Index (PDI) 56         Pain Medications:   - tylenol extended release   - ibuprofen     report:  Reviewed and consistent with medication use as prescribed.  03/11/2022 03/11/2022 1 Oxycodone-Acetaminophn 7.5-325 16.00 4 [facility]       Pain Procedures:   11/23/2015: Caudal MELL ([place])  1/18/2024: Bilateral  piriformis 100% for 3 weeks (still resolution of sciatica pain)    Imaging:   MRI LUMBAR SPINE WITHOUT CONTRAST     CLINICAL HISTORY:  Low back pain, symptoms persist with > 6wks conservative treatment; Lumbago with sciatica, right side     TECHNIQUE:  Multiplanar, multisequence MR images were acquired from the thoracolumbar junction to the sacrum without the administration of contrast.     COMPARISON:  10/21/2019     FINDINGS:  Alignment: Grade 1 anterolisthesis of L3-4.     Vertebrae: 5 lumbar-type vertebral bodies. No aggressive marrow replacement process or fracture.Nobone marrow edema .     Discs: Degenerative changes L2-L5 disc space levels with disc space narrowing L4-5.     Cord: Normal. Conus terminates at L1.     Degenerative findings:     T12-L1: There is no focal disc herniation. No significant central canal narrowing . No significant neural foraminal narrowing.     L1-L2: There is no focal disc herniation. No significant central canal narrowing . No significant neural foraminal narrowing.     L2-L3: There is no focal disc herniation.Moderate hypertrophic changes of facets/ligamentum flavum thickening. Mild central canal narrowing . No significant neural foraminal narrowing.     L3-L4: There is no focal disc herniation.Broad-based disc bulge, facet degenerative change and ligamentum flavum thickening which contribute to  severe central canal narrowing .  Moderate right and mild left neural foraminal narrowing.     L4-L5: There is no focal disc herniation.Broad-based disc bulge, facet degenerative change and ligamentum flavum thickening which contribute to  severe central canal narrowing . Moderate right and mild left neural foraminal narrowing.     L5-S1: There is no focal disc herniation.Mild hypertrophic changes of facets/ligamentum flavum thickening. Mild central canal narrowing . No significant neural foraminal narrowing.     Paraspinal muscles & soft tissues: Unremarkable.     Impression:     Lumbar  spondylosis L2-L5 worst at L3-4 and L4-5.        Electronically signed by: De Ross MD  Date:                                            11/30/2023  Time:                                           09:36    Allergies:   Review of patient's allergies indicates:   Allergen Reactions    Sulfa (sulfonamide antibiotics) Itching and Rash     Other reaction(s): Rash       Current Medications:   Current Outpatient Medications   Medication Sig Dispense Refill    amLODIPine (NORVASC) 5 MG tablet Take 1 tablet (5 mg total) by mouth once daily. 90 tablet 3    apple cider vinegar 500 mg Tab       ascorbic acid, vitamin C, (VITAMIN C) 500 MG tablet Take 500 mg by mouth once daily.      aspirin 81 mg Tab Take by mouth. 1 Tablet Oral Every day      B-complex with vitamin C (Z-BEC OR EQUIV) tablet       biotin 1,000 mcg Chew       ciclopirox (PENLAC) 8 % Soln Apply topically nightly. 6.6 mL 3    colchicine (COLCRYS) 0.6 mg tablet Take 2 tablets by mouth, then take 1 tablet by mouth 1 hour later; okay to repeat in 24 hours if warranted 6 tablet 0    docusate sodium (COLACE) 100 MG capsule Take 100 mg by mouth once daily.      ezetimibe (ZETIA) 10 mg tablet Take 1 tablet (10 mg total) by mouth once daily. 90 tablet 0    GLUC MAURICE/CHONDRO MAURICE A/VIT C/MN (GLUCOSAMINE-CHONDROIT-VIT C-MN) 450-051-47-5 mg Tab Take by mouth. 1 Tablet Oral Every day      green tea leaf extract 315 mg Cap Take by mouth. 1 Capsule Oral Every day      hydrALAZINE (APRESOLINE) 25 MG tablet Take 1 tablet (25 mg total) by mouth every 12 (twelve) hours. 180 tablet 1    lisinopriL (PRINIVIL,ZESTRIL) 40 MG tablet Take 2 tablets (80 mg total) by mouth once daily. 180 tablet 3    methocarbamoL (ROBAXIN) 500 MG Tab Take 1 tablet (500 mg total) by mouth 3 (three) times daily as needed. 90 tablet 2    metoprolol succinate (TOPROL-XL) 50 MG 24 hr tablet Take 1 tablet (50 mg total) by mouth once daily. 90 tablet 3    omega-3 fatty acids 1,000 mg Cap Take by mouth. 1  Capsule Oral Every day      pantoprazole (PROTONIX) 40 MG tablet Take 1 tablet (40 mg total) by mouth once daily. 90 tablet 3    UNABLE TO FIND medication name: Tumeric 1000 mg daily      VITAMIN E,DL-ALPHA TOCOPHEROL, (VITAMIN E, BULK, MISC) by Misc.(Non-Drug; Combo Route) route.      LIDOcaine (LIDODERM) 5 % Place 1 patch onto the skin once daily. Remove & Discard patch within 12 hours or as directed by MD 30 patch 2     No current facility-administered medications for this visit.       REVIEW OF SYSTEMS:    GENERAL:  No weight loss, malaise or fevers.  HEENT:  Negative for frequent or significant headaches.  NECK:  Negative for lumps, goiter, pain and significant neck swelling.  RESPIRATORY:  Negative for cough, wheezing or shortness of breath.  CARDIOVASCULAR:  Negative for chest pain, leg swelling or palpitations.  GI:  Negative for abdominal discomfort, blood in stools or black stools or change in bowel habits.  MUSCULOSKELETAL:  See HPI.  SKIN:  Negative for lesions, rash, and itching.  PSYCH:  Negative for sleep disturbance, mood disorder and recent psychosocial stressors.  HEMATOLOGY/LYMPHOLOGY:  Negative for prolonged bleeding, bruising easily or swollen nodes. +ASA 81mg  NEURO:   No history of headaches, syncope, paralysis, seizures or tremors.  All other reviewed and negative other than HPI.    Past Medical History:  Past Medical History:   Diagnosis Date    Arthritis     Blood transfusion     Chronic midline low back pain with bilateral sciatica 9/8/2023    Corneal abrasion 20 yrs    ? eye due to cls     GERD (gastroesophageal reflux disease)     Hyperlipidemia     Hypertension        Past Surgical History:  Past Surgical History:   Procedure Laterality Date    Breast reduction      BREAST SURGERY      COLONOSCOPY N/A 7/20/2022    Procedure: COLONOSCOPY;  Surgeon: Chanda Hawkins MD;  Location: [facility];  Service: Endoscopy;  Laterality: N/A;    ESOPHAGOGASTRODUODENOSCOPY N/A 7/20/2022     Procedure: ESOPHAGOGASTRODUODENOSCOPY (EGD);  Surgeon: Chanda Hawkins MD;  Location: [facility] ENDO;  Service: Endoscopy;  Laterality: N/A;    EYE SURGERY      Lasik bilat eyes    HYSTERECTOMY      INJECTION Bilateral 1/18/2024    Procedure: INJECTION, BILATERAL PIRIFORMIS;  Surgeon: Raj Pierre MD;  Location: [facility], [place], operated by [facility];  Service: Pain Management;  Laterality: Bilateral;  [phone]  2 WK F/U ERIBERTO    LASER LAPAROSCOPY  1988    MINI-LAPAROTOMY  1988    OOPHORECTOMY      TOTAL REDUCTION MAMMOPLASTY         Family History:  Family History   Problem Relation Age of Onset    Arthritis Mother     Heart disease Mother     Hypertension Mother     Cataracts Mother     Heart disease Father     Hypertension Father     Stroke Father     Diabetes Sister     Hypertension Sister     Hypertension Sister     Eczema Other     Arthritis Maternal Grandmother     Heart disease Maternal Grandmother     Hypertension Maternal Grandmother     Arthritis Maternal Grandfather     Arthritis Paternal Grandmother     Heart disease Paternal Grandmother     Arthritis Paternal Grandfather     No Known Problems Brother     No Known Problems Maternal Aunt     No Known Problems Maternal Uncle     No Known Problems Paternal Aunt     No Known Problems Paternal Uncle     Ovarian cancer Neg Hx     Breast cancer Neg Hx     Anxiety disorder Neg Hx     Depression Neg Hx     Suicide Neg Hx     Amblyopia Neg Hx     Blindness Neg Hx     Cancer Neg Hx     Glaucoma Neg Hx     Macular degeneration Neg Hx     Retinal detachment Neg Hx     Strabismus Neg Hx     Thyroid disease Neg Hx     Colon cancer Neg Hx     Esophageal cancer Neg Hx        Social History:  Social History     Socioeconomic History    Marital status:     Number of children: 0   Occupational History    Occupation: RN     Employer: [employer]   Tobacco Use    Smoking status: Never     Passive exposure: Never    Smokeless tobacco: Never   Substance and Sexual Activity     "Alcohol use: Yes     Comment: RARELY    Drug use: No    Sexual activity: Not Currently     Partners: Male   Social History Narrative      several years ago    She has not been sexually-active since    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  Psych:  Mood and affect appropriate.  Skin: Skin color, texture, turgor normal, no rashes or lesions, in both upper and lower body.  Head/face:  Atraumatic, normocephalic.  Cor: regular rate  Pulm: non-labored breathing  GI: Abdomen non-distended and non-tender.  Back: Straight leg raising in the sitting and supine positions is positive to radicular pain. No pain to palpation over the spine or paraspinal muscles. Normal range of motion without pain reproduction.  Extremities: Peripheral joint ROM is full and pain free without obvious instability or laxity in all four extremities. No deformities, edema, or skin discoloration. Good capillary refill.  Musculoskeletal: hip, sacroiliac and knee provocative maneuvers are negative with the exception of left SI tenderness, left yeoman's postivie, left SI thrust positive, right and left GTB tenderness. Bilateral upper and lower extremity strength is normal and symmetric.  No atrophy or tone abnormalities are noted.  Neuro: Bilateral upper and lower extremity coordination and muscle stretch reflexes are physiologic and symmetric.  Negative Clonus. No loss of sensation is noted.  Gait: Normal.    CMP  Sodium   Date Value Ref Range Status   2024 144 136 - 145 mmol/L Final     Potassium   Date Value Ref Range Status   2024 3.8 3.5 - 5.1 mmol/L Final     Chloride   Date Value Ref Range Status "   02/23/2024 106 95 - 110 mmol/L Final     CO2   Date Value Ref Range Status   02/23/2024 29 23 - 29 mmol/L Final     Glucose   Date Value Ref Range Status   02/23/2024 112 (H) 70 - 110 mg/dL Final     BUN   Date Value Ref Range Status   02/23/2024 21 8 - 23 mg/dL Final     Creatinine   Date Value Ref Range Status   02/23/2024 1.9 (H) 0.5 - 1.4 mg/dL Final     Calcium   Date Value Ref Range Status   02/23/2024 9.9 8.7 - 10.5 mg/dL Final     Total Protein   Date Value Ref Range Status   01/17/2024 7.9 6.0 - 8.4 g/dL Final     Albumin   Date Value Ref Range Status   01/17/2024 3.7 3.5 - 5.2 g/dL Final     Total Bilirubin   Date Value Ref Range Status   01/17/2024 0.6 0.1 - 1.0 mg/dL Final     Comment:     For infants and newborns, interpretation of results should be based  on gestational age, weight and in agreement with clinical  observations.    Premature Infant recommended reference ranges:  Up to 24 hours.............<8.0 mg/dL  Up to 48 hours............<12.0 mg/dL  3-5 days..................<15.0 mg/dL  6-29 days.................<15.0 mg/dL       Alkaline Phosphatase   Date Value Ref Range Status   01/17/2024 72 55 - 135 U/L Final     AST   Date Value Ref Range Status   01/17/2024 26 10 - 40 U/L Final     ALT   Date Value Ref Range Status   01/17/2024 39 10 - 44 U/L Final     Anion Gap   Date Value Ref Range Status   02/23/2024 9 8 - 16 mmol/L Final     eGFR   Date Value Ref Range Status   02/23/2024 29 (A) >60 mL/min/1.73 m^2 Final   11/21/2023 31 (L) > OR = 60 mL/min/1.73m2 Final         ASSESSMENT: 68 y.o. year old female with buttock/hip pain, consistent with:    1. Sacroiliac joint pain  Procedure Order to Pain Management    LIDOcaine (LIDODERM) 5 %      2. Greater trochanteric bursitis, unspecified laterality  Procedure Order to Pain Management    LIDOcaine (LIDODERM) 5 %            IMPRESSION: Riley ARAVIND Marcus presents today for bilateral hip and bilateral buttock.  She is s/p bilateral piriformis  injection on 1/18/24 with 100% relief of her shooting leg pain for 3 weeks.  She continues to have relief of her shooting leg pain, but now has pain in her buttocks and bilateral hips.  History and physical exam are consistent with lumbar radiculopathy, bilateral sacroiliitis, and bilateral greater trochanteric bursitis.  Imaging is consistent with lumbar degenerative disc disease.  At this point, it seems that her sacroiliitis and greater trochanteric bursitis seem to be bothering more than her radicular pain.  She is actively participating in home exercises, but has failed to achieve significant benefit for her pain.  We will consider her for additional interventions to give her additional pain relief.    PLAN:   - I have stressed the importance of physical activity and a home exercise plan to help with pain and improve health.  - Patient can continue with medications for now since they are providing benefits, using them appropriately, and without side effects.  - schedule for bilateral SI and bilateral GTB injection.  - continue robaxin 500mg as needed for muscular  - script given for lidocaine patch for pain  - she can discuss magnesium supplement with her nephrologist  - RTC after injection  - Counseled patient regarding the importance of activity modification and physical therapy.    The above plan and management options were discussed at length with patient. Patient is in agreement with the above and verbalized understanding.    Raj Pierre  02/26/2024

## 2024-02-26 NOTE — H&P (VIEW-ONLY)
Chronic patient Established Note (Follow up visit)      SUBJECTIVE:    INTERVAL HISTORY 2/26/2024:  Riley Marcus presents to the clinic for a follow-up appointment for chronic pain. Current pain intensity is 8/10.  Since the last visit, Riley Marcus states:  after her piriformis injection, she had 100% pain relief which lasted 3 weeks.  She however states that the pain has returned, and is worse in the morning.  It tends to feel better as the day progresses.  She continues to go to the gym 3 times a week and actively participates in a home exercise program to help her pain.      PRIOR HISTORY:   Riley Marcus presents to the clinic for the evaluation of lower back and bilateral buttocks pain. The pain started many years ago following no inciting event and symptoms have been worsening.The pain is located in the lower back area and radiates to the bilateral buttocks.  The pain is described as sharp and is rated as 8/10. The pain is rated with a score of  5/10 on the BEST day and a score of 8/10 on the WORST day.  Symptoms interfere with daily activity. The pain is exacerbated by walking, kneeling (putting close together), standing.  The pain is mitigated by moving legs far apart. The patient reports 7-8 hours of uninterrupted sleep per night.    Patient denies urinary incontinence, bowel incontinence, and significant motor weakness. She had a caudal MELL in 2015 which she didn't find beneficial    Physical Therapy/Home Exercise: yes, completed in October 2023. Continuing to do home exercise program.      Pain Disability Index Review:      12/11/2023     8:15 AM   Last 3 PDI Scores   Pain Disability Index (PDI) 56         Pain Medications:   - tylenol extended release   - ibuprofen     report:  Reviewed and consistent with medication use as prescribed.  03/11/2022 03/11/2022 1 Oxycodone-Acetaminophn 7.5-325 16.00 4 [facility]       Pain Procedures:   11/23/2015: Caudal MELL ([place])  1/18/2024: Bilateral  piriformis 100% for 3 weeks (still resolution of sciatica pain)    Imaging:   MRI LUMBAR SPINE WITHOUT CONTRAST     CLINICAL HISTORY:  Low back pain, symptoms persist with > 6wks conservative treatment; Lumbago with sciatica, right side     TECHNIQUE:  Multiplanar, multisequence MR images were acquired from the thoracolumbar junction to the sacrum without the administration of contrast.     COMPARISON:  10/21/2019     FINDINGS:  Alignment: Grade 1 anterolisthesis of L3-4.     Vertebrae: 5 lumbar-type vertebral bodies. No aggressive marrow replacement process or fracture.Nobone marrow edema .     Discs: Degenerative changes L2-L5 disc space levels with disc space narrowing L4-5.     Cord: Normal. Conus terminates at L1.     Degenerative findings:     T12-L1: There is no focal disc herniation. No significant central canal narrowing . No significant neural foraminal narrowing.     L1-L2: There is no focal disc herniation. No significant central canal narrowing . No significant neural foraminal narrowing.     L2-L3: There is no focal disc herniation.Moderate hypertrophic changes of facets/ligamentum flavum thickening. Mild central canal narrowing . No significant neural foraminal narrowing.     L3-L4: There is no focal disc herniation.Broad-based disc bulge, facet degenerative change and ligamentum flavum thickening which contribute to  severe central canal narrowing .  Moderate right and mild left neural foraminal narrowing.     L4-L5: There is no focal disc herniation.Broad-based disc bulge, facet degenerative change and ligamentum flavum thickening which contribute to  severe central canal narrowing . Moderate right and mild left neural foraminal narrowing.     L5-S1: There is no focal disc herniation.Mild hypertrophic changes of facets/ligamentum flavum thickening. Mild central canal narrowing . No significant neural foraminal narrowing.     Paraspinal muscles & soft tissues: Unremarkable.     Impression:     Lumbar  spondylosis L2-L5 worst at L3-4 and L4-5.        Electronically signed by: De Ross MD  Date:                                            11/30/2023  Time:                                           09:36    Allergies:   Review of patient's allergies indicates:   Allergen Reactions    Sulfa (sulfonamide antibiotics) Itching and Rash     Other reaction(s): Rash       Current Medications:   Current Outpatient Medications   Medication Sig Dispense Refill    amLODIPine (NORVASC) 5 MG tablet Take 1 tablet (5 mg total) by mouth once daily. 90 tablet 3    apple cider vinegar 500 mg Tab       ascorbic acid, vitamin C, (VITAMIN C) 500 MG tablet Take 500 mg by mouth once daily.      aspirin 81 mg Tab Take by mouth. 1 Tablet Oral Every day      B-complex with vitamin C (Z-BEC OR EQUIV) tablet       biotin 1,000 mcg Chew       ciclopirox (PENLAC) 8 % Soln Apply topically nightly. 6.6 mL 3    colchicine (COLCRYS) 0.6 mg tablet Take 2 tablets by mouth, then take 1 tablet by mouth 1 hour later; okay to repeat in 24 hours if warranted 6 tablet 0    docusate sodium (COLACE) 100 MG capsule Take 100 mg by mouth once daily.      ezetimibe (ZETIA) 10 mg tablet Take 1 tablet (10 mg total) by mouth once daily. 90 tablet 0    GLUC MAURICE/CHONDRO MAURICE A/VIT C/MN (GLUCOSAMINE-CHONDROIT-VIT C-MN) 011-834-18-5 mg Tab Take by mouth. 1 Tablet Oral Every day      green tea leaf extract 315 mg Cap Take by mouth. 1 Capsule Oral Every day      hydrALAZINE (APRESOLINE) 25 MG tablet Take 1 tablet (25 mg total) by mouth every 12 (twelve) hours. 180 tablet 1    lisinopriL (PRINIVIL,ZESTRIL) 40 MG tablet Take 2 tablets (80 mg total) by mouth once daily. 180 tablet 3    methocarbamoL (ROBAXIN) 500 MG Tab Take 1 tablet (500 mg total) by mouth 3 (three) times daily as needed. 90 tablet 2    metoprolol succinate (TOPROL-XL) 50 MG 24 hr tablet Take 1 tablet (50 mg total) by mouth once daily. 90 tablet 3    omega-3 fatty acids 1,000 mg Cap Take by mouth. 1  Capsule Oral Every day      pantoprazole (PROTONIX) 40 MG tablet Take 1 tablet (40 mg total) by mouth once daily. 90 tablet 3    UNABLE TO FIND medication name: Tumeric 1000 mg daily      VITAMIN E,DL-ALPHA TOCOPHEROL, (VITAMIN E, BULK, MISC) by Misc.(Non-Drug; Combo Route) route.      LIDOcaine (LIDODERM) 5 % Place 1 patch onto the skin once daily. Remove & Discard patch within 12 hours or as directed by MD 30 patch 2     No current facility-administered medications for this visit.       REVIEW OF SYSTEMS:    GENERAL:  No weight loss, malaise or fevers.  HEENT:  Negative for frequent or significant headaches.  NECK:  Negative for lumps, goiter, pain and significant neck swelling.  RESPIRATORY:  Negative for cough, wheezing or shortness of breath.  CARDIOVASCULAR:  Negative for chest pain, leg swelling or palpitations.  GI:  Negative for abdominal discomfort, blood in stools or black stools or change in bowel habits.  MUSCULOSKELETAL:  See HPI.  SKIN:  Negative for lesions, rash, and itching.  PSYCH:  Negative for sleep disturbance, mood disorder and recent psychosocial stressors.  HEMATOLOGY/LYMPHOLOGY:  Negative for prolonged bleeding, bruising easily or swollen nodes. +ASA 81mg  NEURO:   No history of headaches, syncope, paralysis, seizures or tremors.  All other reviewed and negative other than HPI.    Past Medical History:  Past Medical History:   Diagnosis Date    Arthritis     Blood transfusion     Chronic midline low back pain with bilateral sciatica 9/8/2023    Corneal abrasion 20 yrs    ? eye due to cls     GERD (gastroesophageal reflux disease)     Hyperlipidemia     Hypertension        Past Surgical History:  Past Surgical History:   Procedure Laterality Date    Breast reduction      BREAST SURGERY      COLONOSCOPY N/A 7/20/2022    Procedure: COLONOSCOPY;  Surgeon: Chanda Hawkins MD;  Location: [facility];  Service: Endoscopy;  Laterality: N/A;    ESOPHAGOGASTRODUODENOSCOPY N/A 7/20/2022     Procedure: ESOPHAGOGASTRODUODENOSCOPY (EGD);  Surgeon: Chanda Hawkins MD;  Location: [facility] ENDO;  Service: Endoscopy;  Laterality: N/A;    EYE SURGERY      Lasik bilat eyes    HYSTERECTOMY      INJECTION Bilateral 1/18/2024    Procedure: INJECTION, BILATERAL PIRIFORMIS;  Surgeon: Raj Pierre MD;  Location: [facility] PAIN [facility]GT;  Service: Pain Management;  Laterality: Bilateral;  [phone]  2 WK F/U ERIBERTO    LASER LAPAROSCOPY  1988    MINI-LAPAROTOMY  1988    OOPHORECTOMY      TOTAL REDUCTION MAMMOPLASTY         Family History:  Family History   Problem Relation Age of Onset    Arthritis Mother     Heart disease Mother     Hypertension Mother     Cataracts Mother     Heart disease Father     Hypertension Father     Stroke Father     Diabetes Sister     Hypertension Sister     Hypertension Sister     Eczema Other     Arthritis Maternal Grandmother     Heart disease Maternal Grandmother     Hypertension Maternal Grandmother     Arthritis Maternal Grandfather     Arthritis Paternal Grandmother     Heart disease Paternal Grandmother     Arthritis Paternal Grandfather     No Known Problems Brother     No Known Problems Maternal Aunt     No Known Problems Maternal Uncle     No Known Problems Paternal Aunt     No Known Problems Paternal Uncle     Ovarian cancer Neg Hx     Breast cancer Neg Hx     Anxiety disorder Neg Hx     Depression Neg Hx     Suicide Neg Hx     Amblyopia Neg Hx     Blindness Neg Hx     Cancer Neg Hx     Glaucoma Neg Hx     Macular degeneration Neg Hx     Retinal detachment Neg Hx     Strabismus Neg Hx     Thyroid disease Neg Hx     Colon cancer Neg Hx     Esophageal cancer Neg Hx        Social History:  Social History     Socioeconomic History    Marital status:     Number of children: 0   Occupational History    Occupation: RN     Employer: [employer]   Tobacco Use    Smoking status: Never     Passive exposure: Never    Smokeless tobacco: Never   Substance and Sexual Activity     "Alcohol use: Yes     Comment: RARELY    Drug use: No    Sexual activity: Not Currently     Partners: Male   Social History Narrative      several years ago    She has not been sexually-active since    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 [facility] [place]IVE:    BP (!) 140/89   Pulse 73   Temp 97.7 °F (36.5 °C) (Oral)   Resp 18   Ht 5' 2" (1.575 m)   Wt 79 kg (174 lb 2.6 oz)   SpO2 100%   BMI 31.85 kg/m²     PHYSICAL EXAMINATION:  General appearance: Well appearing, in no acute distress, alert and appropriately communicative.  Psych:  Mood and affect appropriate.  Skin: Skin color, texture, turgor normal, no rashes or lesions, in both upper and lower body.  Head/face:  Atraumatic, normocephalic.  Cor: regular rate  Pulm: non-labored breathing  GI: Abdomen non-distended and non-tender.  Back: Straight leg raising in the sitting and supine positions is positive to radicular pain. No pain to palpation over the spine or paraspinal muscles. Normal range of motion without pain reproduction.  Extremities: Peripheral joint ROM is full and pain free without obvious instability or laxity in all four extremities. No deformities, edema, or skin discoloration. Good capillary refill.  Musculoskeletal: hip, sacroiliac and knee provocative maneuvers are negative with the exception of left SI tenderness, left yeoman's postivie, left SI thrust positive, right and left GTB tenderness. Bilateral upper and lower extremity strength is normal and symmetric.  No atrophy or tone abnormalities are noted.  Neuro: Bilateral upper and lower extremity coordination and muscle stretch reflexes are physiologic and symmetric.  Negative Clonus. No loss of sensation is noted.  Gait: Normal.    CMP  Sodium   Date Value Ref Range Status   2024 144 136 - 145 mmol/L Final     Potassium   Date Value Ref Range Status   2024 3.8 3.5 - 5.1 mmol/L Final     Chloride   Date Value Ref Range Status "   02/23/2024 106 95 - 110 mmol/L Final     CO2   Date Value Ref Range Status   02/23/2024 29 23 - 29 mmol/L Final     Glucose   Date Value Ref Range Status   02/23/2024 112 (H) 70 - 110 mg/dL Final     BUN   Date Value Ref Range Status   02/23/2024 21 8 - 23 mg/dL Final     Creatinine   Date Value Ref Range Status   02/23/2024 1.9 (H) 0.5 - 1.4 mg/dL Final     Calcium   Date Value Ref Range Status   02/23/2024 9.9 8.7 - 10.5 mg/dL Final     Total Protein   Date Value Ref Range Status   01/17/2024 7.9 6.0 - 8.4 g/dL Final     Albumin   Date Value Ref Range Status   01/17/2024 3.7 3.5 - 5.2 g/dL Final     Total Bilirubin   Date Value Ref Range Status   01/17/2024 0.6 0.1 - 1.0 mg/dL Final     Comment:     For infants and newborns, interpretation of results should be based  on gestational age, weight and in agreement with clinical  observations.    Premature Infant recommended reference ranges:  Up to 24 hours.............<8.0 mg/dL  Up to 48 hours............<12.0 mg/dL  3-5 days..................<15.0 mg/dL  6-29 days.................<15.0 mg/dL       Alkaline Phosphatase   Date Value Ref Range Status   01/17/2024 72 55 - 135 U/L Final     AST   Date Value Ref Range Status   01/17/2024 26 10 - 40 U/L Final     ALT   Date Value Ref Range Status   01/17/2024 39 10 - 44 U/L Final     Anion Gap   Date Value Ref Range Status   02/23/2024 9 8 - 16 mmol/L Final     eGFR   Date Value Ref Range Status   02/23/2024 29 (A) >60 mL/min/1.73 m^2 Final   11/21/2023 31 (L) > OR = 60 mL/min/1.73m2 Final         ASSESSMENT: 68 y.o. year old female with buttock/hip pain, consistent with:    1. Sacroiliac joint pain  Procedure Order to Pain Management    LIDOcaine (LIDODERM) 5 %      2. Greater trochanteric bursitis, unspecified laterality  Procedure Order to Pain Management    LIDOcaine (LIDODERM) 5 %            IMPRESSION: Rilye ARAVIND Marcus presents today for bilateral hip and bilateral buttock.  She is s/p bilateral piriformis  injection on 1/18/24 with 100% relief of her shooting leg pain for 3 weeks.  She continues to have relief of her shooting leg pain, but now has pain in her buttocks and bilateral hips.  History and physical exam are consistent with lumbar radiculopathy, bilateral sacroiliitis, and bilateral greater trochanteric bursitis.  Imaging is consistent with lumbar degenerative disc disease.  At this point, it seems that her sacroiliitis and greater trochanteric bursitis seem to be bothering more than her radicular pain.  She is actively participating in home exercises, but has failed to achieve significant benefit for her pain.  We will consider her for additional interventions to give her additional pain relief.    PLAN:   - I have stressed the importance of physical activity and a home exercise plan to help with pain and improve health.  - Patient can continue with medications for now since they are providing benefits, using them appropriately, and without side effects.  - schedule for bilateral SI and bilateral GTB injection.  - continue robaxin 500mg as needed for muscular  - script given for lidocaine patch for pain  - she can discuss magnesium supplement with her nephrologist  - RTC after injection  - Counseled patient regarding the importance of activity modification and physical therapy.    The above plan and management options were discussed at length with patient. Patient is in agreement with the above and verbalized understanding.    Raj Pierre  02/26/2024

## 2024-03-07 ENCOUNTER — HOSPITAL ENCOUNTER (OUTPATIENT)
Facility: OTHER | Age: 69
Discharge: HOME | End: 2024-03-07
Attending: STUDENT IN AN ORGANIZED HEALTH CARE EDUCATION/TRAINING PROGRAM | Admitting: STUDENT IN AN ORGANIZED HEALTH CARE EDUCATION/TRAINING PROGRAM
Payer: MEDICARE

## 2024-03-07 VITALS
OXYGEN SATURATION: 100 % | BODY MASS INDEX: 32.02 KG/M2 | RESPIRATION RATE: 16 BRPM | TEMPERATURE: 98 F | HEIGHT: 62 IN | SYSTOLIC BLOOD PRESSURE: 191 MMHG | DIASTOLIC BLOOD PRESSURE: 89 MMHG | HEART RATE: 107 BPM | WEIGHT: 174 LBS

## 2024-03-07 DIAGNOSIS — G89.29 CHRONIC PAIN: ICD-10-CM

## 2024-03-07 DIAGNOSIS — M46.1 SACROILIITIS: Primary | ICD-10-CM

## 2024-03-07 PROCEDURE — 20610 DRAIN/INJ JOINT/BURSA W/O US: CPT | Mod: 50 | Performed by: STUDENT IN AN ORGANIZED HEALTH CARE EDUCATION/TRAINING PROGRAM

## 2024-03-07 PROCEDURE — 63600175 PHARM REV CODE 636 W HCPCS: Performed by: STUDENT IN AN ORGANIZED HEALTH CARE EDUCATION/TRAINING PROGRAM

## 2024-03-07 PROCEDURE — 20610 DRAIN/INJ JOINT/BURSA W/O US: CPT | Mod: 50,59,, | Performed by: STUDENT IN AN ORGANIZED HEALTH CARE EDUCATION/TRAINING PROGRAM

## 2024-03-07 PROCEDURE — 25000003 PHARM REV CODE 250: Performed by: STUDENT IN AN ORGANIZED HEALTH CARE EDUCATION/TRAINING PROGRAM

## 2024-03-07 PROCEDURE — 27096 INJECT SACROILIAC JOINT: CPT | Mod: 50 | Performed by: STUDENT IN AN ORGANIZED HEALTH CARE EDUCATION/TRAINING PROGRAM

## 2024-03-07 PROCEDURE — 25500020 PHARM REV CODE 255: Performed by: STUDENT IN AN ORGANIZED HEALTH CARE EDUCATION/TRAINING PROGRAM

## 2024-03-07 PROCEDURE — 27096 INJECT SACROILIAC JOINT: CPT | Mod: 50,,, | Performed by: STUDENT IN AN ORGANIZED HEALTH CARE EDUCATION/TRAINING PROGRAM

## 2024-03-07 RX ORDER — TRIAMCINOLONE ACETONIDE 40 MG/ML
INJECTION, SUSPENSION INTRA-ARTICULAR; INTRAMUSCULAR
Status: DISCONTINUED | OUTPATIENT
Start: 2024-03-07 | End: 2024-03-07 | Stop reason: HOSPADM

## 2024-03-07 RX ORDER — BUPIVACAINE HYDROCHLORIDE 2.5 MG/ML
INJECTION, SOLUTION EPIDURAL; INFILTRATION; INTRACAUDAL
Status: DISCONTINUED | OUTPATIENT
Start: 2024-03-07 | End: 2024-03-07 | Stop reason: HOSPADM

## 2024-03-07 RX ORDER — SODIUM CHLORIDE 9 MG/ML
INJECTION, SOLUTION INTRAVENOUS CONTINUOUS
Status: DISCONTINUED | OUTPATIENT
Start: 2024-03-07 | End: 2024-03-07 | Stop reason: HOSPADM

## 2024-03-07 RX ORDER — LIDOCAINE HYDROCHLORIDE 20 MG/ML
INJECTION, SOLUTION INFILTRATION; PERINEURAL
Status: DISCONTINUED | OUTPATIENT
Start: 2024-03-07 | End: 2024-03-07 | Stop reason: HOSPADM

## 2024-03-07 NOTE — INTERVAL H&P NOTE
The patient was examined and no significant changes were noted from the updated H&P or last clinic note.    The risks and benefits of this procedure, including alternative therapies, were discussed with the patient.  The discussion of risks included infection, bleeding, need for additional procedures or surgery, nerve damage, paralysis, adverse medication reaction(s), stroke, and if appropriate for the procedure, death.  Questions regarding the procedure, risks, expected outcome, and possible side effects were solicited and answered to Riley's satisfaction.  Riley Marcus wishes to proceed with the injection or procedure as confirmed by written consent.

## 2024-03-07 NOTE — OP NOTE
Sacroiliac Joint and Greater Trochanteric Bursa Injection under Fluoroscopic Guidance    The procedure, risks, benefits, and options were discussed with the patient. There are no contraindications to the procedure. The patent expressed understanding and agreed to the procedure. Informed written consent was obtained prior to the start of the procedure and can be found in the patient's chart.    PATIENT NAME: Riley Marcus   MRN: [id number]     DATE OF PROCEDURE: 03/07/2024    PROCEDURE:   Bilateral Sacroiliac Joint Injection under Fluoroscopic Guidance  Bilateral Greater Trochanteric Bursa Injection under Fluoroscopic Guidance    PRE-OP DIAGNOSIS: Sacroiliac joint pain [M53.3]  Greater trochanteric bursitis, unspecified laterality [M70.60]    POST-OP DIAGNOSIS: Same    PHYSICIAN: Raj Pierre MD    ASSISTANTS: None    MEDICATIONS INJECTED: Preservative-free Kenalog 40mg with 7cc of Bupivacine 0.25%     LOCAL ANESTHETIC INJECTED: Xylocaine 2%     SEDATION: None    ESTIMATED BLOOD LOSS: None    COMPLICATIONS: None    TECHNIQUE: Time-out was performed to identify the patient and procedure to be performed. With the patient laying in a prone position, the surgical area was prepped and draped in the usual sterile fashion using ChloraPrep and a fenestrated drape. The sacroiliac joint was determined under fluoroscopy guidance. Skin anesthesia was achieved by injecting Lidocaine 2% over the injection site. The sacroiliac joint was then approached with a 25 gauge,  3 inch spinal quinke needle that was introduced under fluoroscopic guidance in the AP and Lateral views. Once the needle tip was in the area of the joint, and there was no blood aspiration, contrast dye contrast dye Omnipaque (240mg/mL) was injected to confirm placement and there was no vascular runoff. Fluoroscopic imaging in the AP and lateral views revealed a clear outline of the joint space. 3 mL of the medication mixture listed above was injected slowly  intraarticular and pavan-articular. Displacement of the radio opaque contrast after injection of the medication confirmed that the medication went into the area of the joint. The needles were removed and bleeding was nil. A sterile dressing was applied. No specimens collected. The patient tolerated the procedure well.     TECHNIQUE: Time-out was performed to identify the patient and procedure to be performed. With the patient laying in a prone position, the surgical area was prepped and draped in the usual sterile fashion using ChloraPrep and a fenestrated drape. The area overlying the greater trochanteric bursa was determined under fluoroscopy guidance. Skin anesthesia was achieved by injecting Lidocaine 2% over the injection site. The greater trochanteric bursa was then approached with a 22 gauge, 5 inch spinal quinke needle that was introduced under fluoroscopic guidance in the AP view. Once the needle tip was in the area of the bursa, and there was no blood aspiration,  Contrast dye  Omnipaque (240mg/mL) was injected to confirm placement and there was no vascular runoff. 2 mL of the medication mixture listed above was injected slowly. The needles were removed and bleeding was nil. A sterile dressing was applied. No specimens collected. The patient tolerated the procedure well.    The patient was monitored after the procedure in the recovery area. They were given post-procedure and discharge instructions to follow at home. The patient was discharged in a stable condition.    Raj Pierre MD

## 2024-03-07 NOTE — DISCHARGE SUMMARY
Discharge Note  Short Stay      SUMMARY     Admit Date: 3/7/2024    Attending Physician: Raj Pierre MD PhD    Discharge Physician: Raj Pierre      Discharge Date: 3/7/2024 11:17 AM    Procedure(s) (LRB):  INJECTION, JOINT BILATERAL SI AND BILATERAL GTB (Bilateral)    Final Diagnosis: Sacroiliac joint pain [M53.3]  Greater trochanteric bursitis, unspecified laterality [M70.60]    Disposition: Home or self care    Patient Instructions:   Current Discharge Medication List        CONTINUE these medications which have NOT CHANGED    Details   amLODIPine (NORVASC) 5 MG tablet Take 1 tablet (5 mg total) by mouth once daily.  Qty: 90 tablet, Refills: 3    Comments: .  Associated Diagnoses: Primary hypertension      apple cider vinegar 500 mg Tab       ascorbic acid, vitamin C, (VITAMIN C) 500 MG tablet Take 500 mg by mouth once daily.      aspirin 81 mg Tab Take by mouth. 1 Tablet Oral Every day      B-complex with vitamin C (Z-BEC OR EQUIV) tablet       biotin 1,000 mcg Chew       ciclopirox (PENLAC) 8 % Soln Apply topically nightly.  Qty: 6.6 mL, Refills: 3      colchicine (COLCRYS) 0.6 mg tablet Take 2 tablets by mouth, then take 1 tablet by mouth 1 hour later; okay to repeat in 24 hours if warranted  Qty: 6 tablet, Refills: 0    Associated Diagnoses: Idiopathic chronic gout without tophus, unspecified site      docusate sodium (COLACE) 100 MG capsule Take 100 mg by mouth once daily.      ezetimibe (ZETIA) 10 mg tablet Take 1 tablet (10 mg total) by mouth once daily.  Qty: 90 tablet, Refills: 0    Associated Diagnoses: Myalgia due to statin; Pure hypercholesterolemia      GLUC MAURICE/CHONDRO MAURICE A/VIT C/MN (GLUCOSAMINE-CHONDROIT-VIT C-MN) 293-044-67-5 mg Tab Take by mouth. 1 Tablet Oral Every day      green tea leaf extract 315 mg Cap Take by mouth. 1 Capsule Oral Every day      hydrALAZINE (APRESOLINE) 25 MG tablet Take 1 tablet (25 mg total) by mouth every 12 (twelve) hours.  Qty: 180 tablet, Refills: 1    Comments:  .  Associated Diagnoses: Primary hypertension      LIDOcaine (LIDODERM) 5 % Place 1 patch onto the skin once daily. Remove & Discard patch within 12 hours or as directed by MD  Qty: 30 patch, Refills: 2    Associated Diagnoses: Sacroiliac joint pain; Greater trochanteric bursitis, unspecified laterality      lisinopriL (PRINIVIL,ZESTRIL) 40 MG tablet Take 2 tablets (80 mg total) by mouth once daily.  Qty: 180 tablet, Refills: 3    Comments: .  Associated Diagnoses: Primary hypertension      methocarbamoL (ROBAXIN) 500 MG Tab Take 1 tablet (500 mg total) by mouth 3 (three) times daily as needed.  Qty: 90 tablet, Refills: 2    Associated Diagnoses: Spinal stenosis of lumbar region with neurogenic claudication; Myofascial pain syndrome      metoprolol succinate (TOPROL-XL) 50 MG 24 hr tablet Take 1 tablet (50 mg total) by mouth once daily.  Qty: 90 tablet, Refills: 3    Comments: .  Associated Diagnoses: Primary hypertension      omega-3 fatty acids 1,000 mg Cap Take by mouth. 1 Capsule Oral Every day      pantoprazole (PROTONIX) 40 MG tablet Take 1 tablet (40 mg total) by mouth once daily.  Qty: 90 tablet, Refills: 3    Associated Diagnoses: Gastroesophageal reflux disease, unspecified whether esophagitis present      UNABLE TO FIND medication name: Tumeric 1000 mg daily      VITAMIN E,DL-ALPHA TOCOPHEROL, (VITAMIN E, BULK, MISC) by Misc.(Non-Drug; Combo Route) route.                 Discharge Diagnosis: Sacroiliac joint pain [M53.3]  Greater trochanteric bursitis, unspecified laterality [M70.60]  Condition on Discharge: Stable with no complications to procedure   Diet on Discharge: Same as before.  Activity: as per instruction sheet.  Discharge to: Home with a responsible adult.  Follow up: 2-4 weeks       Please call my office or pager at [phone] if experienced any weakness or loss of sensation, fever > 101.5, pain uncontrolled with oral medications, persistent nausea/vomiting/or diarrhea, redness or drainage  from the incisions, or any other worrisome concerns. If physician on call was not reached or could not communicate with our office for any reason please go to the nearest emergency department      Raj Pierre MD PhD

## 2024-03-07 NOTE — DISCHARGE INSTRUCTIONS

## 2024-03-13 ENCOUNTER — OFFICE VISIT (OUTPATIENT)
Dept: OPTOMETRY | Facility: CLINIC | Age: 69
End: 2024-03-13
Payer: MEDICARE

## 2024-03-13 DIAGNOSIS — H40.053 OCULAR HYPERTENSION, BILATERAL: Primary | ICD-10-CM

## 2024-03-13 DIAGNOSIS — H52.4 BILATERAL PRESBYOPIA: ICD-10-CM

## 2024-03-13 DIAGNOSIS — H04.123 DRY EYE SYNDROME OF BOTH EYES: ICD-10-CM

## 2024-03-13 DIAGNOSIS — H25.13 SENILE NUCLEAR SCLEROSIS, BILATERAL: ICD-10-CM

## 2024-03-13 PROCEDURE — 4010F ACE/ARB THERAPY RXD/TAKEN: CPT | Mod: CPTII,S$GLB,, | Performed by: OPTOMETRIST

## 2024-03-13 PROCEDURE — 3288F FALL RISK ASSESSMENT DOCD: CPT | Mod: CPTII,S$GLB,, | Performed by: OPTOMETRIST

## 2024-03-13 PROCEDURE — 1160F RVW MEDS BY RX/DR IN RCRD: CPT | Mod: CPTII,S$GLB,, | Performed by: OPTOMETRIST

## 2024-03-13 PROCEDURE — 1101F PT FALLS ASSESS-DOCD LE1/YR: CPT | Mod: CPTII,S$GLB,, | Performed by: OPTOMETRIST

## 2024-03-13 PROCEDURE — 1159F MED LIST DOCD IN RCRD: CPT | Mod: CPTII,S$GLB,, | Performed by: OPTOMETRIST

## 2024-03-13 PROCEDURE — 99999 PR PBB SHADOW E&M-EST. PATIENT-LVL III: CPT | Mod: PBBFAC,,, | Performed by: OPTOMETRIST

## 2024-03-13 PROCEDURE — 92004 COMPRE OPH EXAM NEW PT 1/>: CPT | Mod: S$GLB,,, | Performed by: OPTOMETRIST

## 2024-03-13 PROCEDURE — 3066F NEPHROPATHY DOC TX: CPT | Mod: CPTII,S$GLB,, | Performed by: OPTOMETRIST

## 2024-03-13 PROCEDURE — 1126F AMNT PAIN NOTED NONE PRSNT: CPT | Mod: CPTII,S$GLB,, | Performed by: OPTOMETRIST

## 2024-03-13 RX ORDER — FERROUS SULFATE 325(65) MG
TABLET ORAL
COMMUNITY

## 2024-03-13 NOTE — PROGRESS NOTES
HPI    Last eye exam was approximately 1 year ago ([facility]).  Patient states decrease in vision since her last exam-didn't see well with   most recent glasses rx. Prefers separate distance and readers vs bifocals.   Has trouble seeing at night to drive due to glare. Occasionally sees   floaters OU.  Patient denies diplopia, headaches, flashes, and pain.        Last edited by Alexandra Altamirano MA on 3/13/2024  1:11 PM.            Assessment /Plan     For exam results, see Encounter Report.      Ocular hypertension, bilateral  -     Posterior Segment OCT Optic Nerve- Both eyes; Future  -     Gonzalez Visual Field - OU - Extended - Both Eyes; Future  (-) FHx. IOP 24 OD, 22 OS. C/d 0.5 OD, 0.4 OS. Educated pt on findings w/understanding. RTC refraction/IOP/pachy/OCT/24-2 SS HVF    Bilateral presbyopia  Hold Srx.     Dry eye syndrome of both eyes  Recommend Systane Ultra or Refresh Optive TID-QID OU to aid with symptoms of dry eyes.    Senile nuclear sclerosis, bilateral  Nuclear sclerotic cataract - not visually significant. Observe.

## 2024-03-19 DIAGNOSIS — I10 PRIMARY HYPERTENSION: ICD-10-CM

## 2024-03-20 RX ORDER — AMLODIPINE BESYLATE 5 MG/1
5 TABLET ORAL DAILY
Qty: 90 TABLET | Refills: 2 | OUTPATIENT
Start: 2024-03-20

## 2024-03-20 NOTE — TELEPHONE ENCOUNTER
Care Due:                  Date            Visit Type   Department     Provider  --------------------------------------------------------------------------------    Last Visit: None Found      None         None Found  Next Visit: None Scheduled  None         None Found                                                            Last  Test          Frequency    Reason                     Performed    Due Date  --------------------------------------------------------------------------------    Office Visit  15 months..  amLODIPine, colchicine,    Not Found    Overdue                             ezetimibe, hydrALAZINE,                             metoprolol, pantoprazole.    Lipid Panel.  12 months..  ezetimibe................  02- 02-    [facility] Embedded Care Due Messages. Reference number: [id number].   3/19/2024 7:49:29 PM CDT

## 2024-03-20 NOTE — TELEPHONE ENCOUNTER
Refill Routing Note   Medication(s) are not appropriate for processing by [facility] for the following reason(s):        Required vitals abnormal    [facility] action(s):  Defer   Requires appointment : Yes     Requires labs : Yes             Appointments  past 12m or future 3m with PCP    Date Provider   Last Visit   11/21/2023 Olga Duncan MD   Next Visit   Visit date not found Olga Duncan MD   ED visits in past 90 days: 0        Note composed:4:45 AM 03/20/2024

## 2024-03-21 ENCOUNTER — OFFICE VISIT (OUTPATIENT)
Dept: PAIN MEDICINE | Facility: CLINIC | Age: 69
End: 2024-03-21
Payer: MEDICARE

## 2024-03-21 VITALS
DIASTOLIC BLOOD PRESSURE: 81 MMHG | HEIGHT: 62 IN | RESPIRATION RATE: 18 BRPM | HEART RATE: 57 BPM | TEMPERATURE: 98 F | OXYGEN SATURATION: 100 % | BODY MASS INDEX: 32.05 KG/M2 | WEIGHT: 174.19 LBS | SYSTOLIC BLOOD PRESSURE: 169 MMHG

## 2024-03-21 DIAGNOSIS — M46.1 SACROILIITIS: Primary | ICD-10-CM

## 2024-03-21 DIAGNOSIS — M70.60 GREATER TROCHANTERIC BURSITIS, UNSPECIFIED LATERALITY: ICD-10-CM

## 2024-03-21 DIAGNOSIS — M79.18 MYOFASCIAL PAIN SYNDROME: ICD-10-CM

## 2024-03-21 DIAGNOSIS — G57.00 PIRIFORMIS SYNDROME, UNSPECIFIED LATERALITY: ICD-10-CM

## 2024-03-21 PROCEDURE — 99214 OFFICE O/P EST MOD 30 MIN: CPT | Mod: S$GLB,,,

## 2024-03-21 PROCEDURE — 3066F NEPHROPATHY DOC TX: CPT | Mod: CPTII,S$GLB,,

## 2024-03-21 PROCEDURE — 1159F MED LIST DOCD IN RCRD: CPT | Mod: CPTII,S$GLB,,

## 2024-03-21 PROCEDURE — 1101F PT FALLS ASSESS-DOCD LE1/YR: CPT | Mod: CPTII,S$GLB,,

## 2024-03-21 PROCEDURE — 99999 PR PBB SHADOW E&M-EST. PATIENT-LVL V: CPT | Mod: PBBFAC,,,

## 2024-03-21 PROCEDURE — 3008F BODY MASS INDEX DOCD: CPT | Mod: CPTII,S$GLB,,

## 2024-03-21 PROCEDURE — 3288F FALL RISK ASSESSMENT DOCD: CPT | Mod: CPTII,S$GLB,,

## 2024-03-21 PROCEDURE — 3077F SYST BP >= 140 MM HG: CPT | Mod: CPTII,S$GLB,,

## 2024-03-21 PROCEDURE — 4010F ACE/ARB THERAPY RXD/TAKEN: CPT | Mod: CPTII,S$GLB,,

## 2024-03-21 PROCEDURE — 1160F RVW MEDS BY RX/DR IN RCRD: CPT | Mod: CPTII,S$GLB,,

## 2024-03-21 PROCEDURE — 3079F DIAST BP 80-89 MM HG: CPT | Mod: CPTII,S$GLB,,

## 2024-03-21 PROCEDURE — 1125F AMNT PAIN NOTED PAIN PRSNT: CPT | Mod: CPTII,S$GLB,,

## 2024-03-21 NOTE — PATIENT INSTRUCTIONS
You can take Tylenol Extra Strength (500 mg tablets)  Can take 2 Tablets (1000 mg total) three times per day for a total daily dose of 3000 mg

## 2024-03-21 NOTE — PROGRESS NOTES
Chronic patient Established Note (Follow up visit)      SUBJECTIVE:    INTERVAL HISTORY 3/21/2024:  Riley Marcus presents for follow-up s/p Bilateral SIJ and Bilateral GTB on 3/7/2024.  She states this has provided 70% relief to her SIJ and 0% to the GTB. She continues robaxin 500 mg TID PRN and Lidocaine patches as needed with  moderate relief.  She denies any perceived side effects.   The patient denies fever/night sweats, urinary incontinence, bowel incontinence, significant weight changes, significant motor weakness or changes, or loss of sensations.  Her pain today is 7/10    INTERVAL HISTORY 2/26/2024:  Riley Marcus presents to the clinic for a follow-up appointment for chronic pain. Current pain intensity is 8/10.  Since the last visit, Riley Marcus states:  after her piriformis injection, she had 100% pain relief which lasted 3 weeks.  She however states that the pain has returned, and is worse in the morning.  It tends to feel better as the day progresses.  She continues to go to the gym 3 times a week and actively participates in a home exercise program to help her pain.      PRIOR HISTORY:   Riley Marcus presents to the clinic for the evaluation of lower back and bilateral buttocks pain. The pain started many years ago following no inciting event and symptoms have been worsening.The pain is located in the lower back area and radiates to the bilateral buttocks.  The pain is described as sharp and is rated as 8/10. The pain is rated with a score of  5/10 on the BEST day and a score of 8/10 on the WORST day.  Symptoms interfere with daily activity. The pain is exacerbated by walking, kneeling (putting close together), standing.  The pain is mitigated by moving legs far apart. The patient reports 7-8 hours of uninterrupted sleep per night.    Patient denies urinary incontinence, bowel incontinence, and significant motor weakness. She had a caudal MELL in 2015 which she didn't find  beneficial    Physical Therapy/Home Exercise: yes, completed in October 2023. Continuing to do home exercise program.      Pain Disability Index Review:      12/11/2023     8:15 AM   Last 3 PDI Scores   Pain Disability Index (PDI) 56         Pain Medications:   - tylenol extended release   - ibuprofen     report:  Reviewed and consistent with medication use as prescribed.  03/11/2022 03/11/2022 1 Oxycodone-Acetaminophn 7.5-325 16.00 4 [facility] [place]       Pain Procedures:   11/23/2015: Caudal MELL ([place])  1/18/2024: Bilateral piriformis 100% for 3 weeks (still resolution of sciatica pain)  3/7/2024 - Bilateral SIJ and Bilateral GTB - 70% in the SIJ 0% in GTB relief    Imaging:   MRI LUMBAR SPINE WITHOUT CONTRAST     CLINICAL HISTORY:  Low back pain, symptoms persist with > 6wks conservative treatment; Lumbago with sciatica, right side     TECHNIQUE:  Multiplanar, multisequence MR images were acquired from the thoracolumbar junction to the sacrum without the administration of contrast.     COMPARISON:  10/21/2019     FINDINGS:  Alignment: Grade 1 anterolisthesis of L3-4.     Vertebrae: 5 lumbar-type vertebral bodies. No aggressive marrow replacement process or fracture.Nobone marrow edema .     Discs: Degenerative changes L2-L5 disc space levels with disc space narrowing L4-5.     Cord: Normal. Conus terminates at L1.     Degenerative findings:     T12-L1: There is no focal disc herniation. No significant central canal narrowing . No significant neural foraminal narrowing.     L1-L2: There is no focal disc herniation. No significant central canal narrowing . No significant neural foraminal narrowing.     L2-L3: There is no focal disc herniation.Moderate hypertrophic changes of facets/ligamentum flavum thickening. Mild central canal narrowing . No significant neural foraminal narrowing.     L3-L4: There is no focal disc herniation.Broad-based disc bulge, facet degenerative change and ligamentum flavum thickening which  contribute to  severe central canal narrowing .  Moderate right and mild left neural foraminal narrowing.     L4-L5: There is no focal disc herniation.Broad-based disc bulge, facet degenerative change and ligamentum flavum thickening which contribute to  severe central canal narrowing . Moderate right and mild left neural foraminal narrowing.     L5-S1: There is no focal disc herniation.Mild hypertrophic changes of facets/ligamentum flavum thickening. Mild central canal narrowing . No significant neural foraminal narrowing.     Paraspinal muscles & soft tissues: Unremarkable.     Impression:     Lumbar spondylosis L2-L5 worst at L3-4 and L4-5.        Electronically signed by: De Ross MD  Date:                                            11/30/2023  Time:                                           09:36    Allergies:   Review of patient's allergies indicates:   Allergen Reactions    Sulfa (sulfonamide antibiotics) Itching and Rash     Other reaction(s): Rash       Current Medications:   Current Outpatient Medications   Medication Sig Dispense Refill    amLODIPine (NORVASC) 5 MG tablet Take 1 tablet (5 mg total) by mouth once daily. 90 tablet 3    apple cider vinegar 500 mg Tab       ascorbic acid, vitamin C, (VITAMIN C) 500 MG tablet Take 500 mg by mouth once daily.      aspirin 81 mg Tab Take by mouth. 1 Tablet Oral Every day      B-complex with vitamin C (Z-BEC OR EQUIV) tablet       biotin 1,000 mcg Chew       ciclopirox (PENLAC) 8 % Soln Apply topically nightly. 6.6 mL 3    colchicine (COLCRYS) 0.6 mg tablet Take 2 tablets by mouth, then take 1 tablet by mouth 1 hour later; okay to repeat in 24 hours if warranted 6 tablet 0    docusate sodium (COLACE) 100 MG capsule Take 100 mg by mouth once daily.      ezetimibe (ZETIA) 10 mg tablet Take 1 tablet (10 mg total) by mouth once daily. 90 tablet 0    ferrous sulfate (IRON) 325 mg (65 mg iron) Tab tablet       GLUC MAURICE/CHONDRO MAURICE A/VIT C/MN  (GLUCOSAMINE-CHONDROIT-VIT C-MN) [phone] mg Tab Take by mouth. 1 Tablet Oral Every day      green tea leaf extract 315 mg Cap Take by mouth. 1 Capsule Oral Every day      hydrALAZINE (APRESOLINE) 25 MG tablet Take 1 tablet (25 mg total) by mouth every 12 (twelve) hours. 180 tablet 1    LIDOcaine (LIDODERM) 5 % Place 1 patch onto the skin once daily. Remove & Discard patch within 12 hours or as directed by MD 30 patch 2    lisinopriL (PRINIVIL,ZESTRIL) 40 MG tablet Take 2 tablets (80 mg total) by mouth once daily. 180 tablet 3    methocarbamoL (ROBAXIN) 500 MG Tab Take 1 tablet (500 mg total) by mouth 3 (three) times daily as needed. 90 tablet 2    metoprolol succinate (TOPROL-XL) 50 MG 24 hr tablet Take 1 tablet (50 mg total) by mouth once daily. 90 tablet 3    omega-3 fatty acids 1,000 mg Cap Take by mouth. 1 Capsule Oral Every day      pantoprazole (PROTONIX) 40 MG tablet Take 1 tablet (40 mg total) by mouth once daily. 90 tablet 3    UNABLE TO FIND medication name: Tumeric 1000 mg daily      VITAMIN E,DL-ALPHA TOCOPHEROL, (VITAMIN E, BULK, MISC) by Misc.(Non-Drug; Combo Route) route.       No current facility-administered medications for this visit.       REVIEW OF SYSTEMS:    GENERAL:  No weight loss, malaise or fevers.  HEENT:  Negative for frequent or significant headaches.  NECK:  Negative for lumps, goiter, pain and significant neck swelling.  RESPIRATORY:  Negative for cough, wheezing or shortness of breath.  CARDIOVASCULAR:  Negative for chest pain, leg swelling or palpitations.  GI:  Negative for abdominal discomfort, blood in stools or black stools or change in bowel habits.  MUSCULOSKELETAL:  See HPI.  SKIN:  Negative for lesions, rash, and itching.  PSYCH:  Negative for sleep disturbance, mood disorder and recent psychosocial stressors.  HEMATOLOGY/LYMPHOLOGY:  Negative for prolonged bleeding, bruising easily or swollen nodes. +ASA 81mg  NEURO:   No history of headaches, syncope, paralysis,  seizures or tremors.  All other reviewed and negative other than HPI.    Past Medical History:  Past Medical History:   Diagnosis Date    Arthritis     Blood transfusion     Branch retinal vein occlusion of left eye     Cataract     Chronic midline low back pain with bilateral sciatica 09/08/2023    Corneal abrasion 20 yrs    ? eye due to cls     GERD (gastroesophageal reflux disease)     Hyperlipidemia     Hypertension     Lattice degeneration        Past Surgical History:  Past Surgical History:   Procedure Laterality Date    Breast reduction      BREAST SURGERY      COLONOSCOPY N/A 07/20/2022    Procedure: COLONOSCOPY;  Surgeon: Chanda Hawkins MD;  Location: [facility] ENDO;  Service: Endoscopy;  Laterality: N/A;    ESOPHAGOGASTRODUODENOSCOPY N/A 07/20/2022    Procedure: ESOPHAGOGASTRODUODENOSCOPY (EGD);  Surgeon: Chanda Hawkins MD;  Location: [facility] ENDO;  Service: Endoscopy;  Laterality: N/A;    HYSTERECTOMY      INJECTION Bilateral 01/18/2024    Procedure: INJECTION, BILATERAL PIRIFORMIS;  Surgeon: Raj Pierre MD;  Location: [facility] PAIN MGT;  Service: Pain Management;  Laterality: Bilateral;  [phone]  2 WK F/U ERIBERTO    INJECTION OF JOINT Bilateral 03/07/2024    Procedure: INJECTION, JOINT BILATERAL SI AND BILATERAL GTB;  Surgeon: Raj Pierre MD;  Location: [facility] PAIN MGT;  Service: Pain Management;  Laterality: Bilateral;  [phone]  2 WK F/U ERIBERTO    LASER LAPAROSCOPY  1988    LASIK Bilateral     MINI-LAPAROTOMY  1988    OOPHORECTOMY      TOTAL REDUCTION MAMMOPLASTY         Family History:  Family History   Problem Relation Age of Onset    Arthritis Mother     Heart disease Mother     Hypertension Mother     Cataracts Mother     Heart disease Father     Hypertension Father     Stroke Father     Diabetes Sister     Hypertension Sister     Hypertension Sister     Eczema Other     Arthritis Maternal Grandmother     Heart disease Maternal Grandmother     Hypertension Maternal Grandmother      Arthritis Maternal Grandfather     Arthritis Paternal Grandmother     Heart disease Paternal Grandmother     Arthritis Paternal Grandfather     No Known Problems Brother     No Known Problems Maternal Aunt     No Known Problems Maternal Uncle     No Known Problems Paternal Aunt     No Known Problems Paternal Uncle     Ovarian cancer Neg Hx     Breast cancer Neg Hx     Anxiety disorder Neg Hx     Depression Neg Hx     Suicide Neg Hx     Amblyopia Neg Hx     Blindness Neg Hx     Cancer Neg Hx     Glaucoma Neg Hx     Macular degeneration Neg Hx     Retinal detachment Neg Hx     Strabismus Neg Hx     Thyroid disease Neg Hx     Colon cancer Neg Hx     Esophageal cancer Neg Hx        Social History:  Social History     Socioeconomic History    Marital status:     Number of children: 0   Occupational History    Occupation: RN     Employer: [employer]   Tobacco Use    Smoking status: Never     Passive exposure: Never    Smokeless tobacco: Never   Substance and Sexual Activity    Alcohol use: Yes     Comment: RARELY    Drug use: No    Sexual activity: Not Currently     Partners: Male   Social History Narrative      several years ago    She has not been sexually-active since    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 [facility]     Social Determinants of Health     Financial Resource Strain: Low Risk  (3/11/2024)    Overall Financial Resource Strain (CARDIA)     Difficulty of Paying Living Expenses: Not hard at all   Food Insecurity: No Food Insecurity (3/11/2024)    Hunger Vital Sign     Worried About Running Out of Food in the Last Year: Never true     Ran Out of Food in the Last Year: Never true   Transportation Needs: No Transportation Needs (3/11/2024)    PRAPARE - Transportation     Lack of Transportation (Medical): No     Lack of Transportation (Non-Medical): No   Physical Activity: Sufficiently Active (3/11/2024)    Exercise Vital Sign     Days of Exercise per Week:  "4 days     Minutes of Exercise per Session: 90 min   Stress: No Stress Concern Present (3/11/2024)    [place] [employer] - Occupational Stress Questionnaire     Feeling of Stress : Not at all   Social Connections: Unknown (3/11/2024)    Social Connection and Isolation Panel [NHANES]     Frequency of Communication with Friends and Family: More than three times a week     Frequency of Social Gatherings with Friends and Family: Twice a week     Active Member of Clubs or Organizations: Yes     Attends Club or Organization Meetings: More than 4 times per year     Marital Status:    Housing Stability: Low Risk  (3/11/2024)    Housing Stability Vital Sign     Unable to Pay for Housing in the Last Year: No     Number of Places Lived in the Last Year: 1     Unstable Housing in the Last Year: No       OBJECTIVE:    BP (!) 169/81   Pulse (!) 57   Temp 98 °F (36.7 °C)   Resp 18   Ht 5' 2" (1.575 m)   Wt 79 kg (174 lb 2.6 oz)   SpO2 100%   BMI 31.85 kg/m²     PHYSICAL EXAMINATION:  General appearance: Well appearing, in no acute distress, alert and appropriately communicative.  Psych:  Mood and affect appropriate.  Skin: Skin color, texture, turgor normal, no rashes or lesions, in both upper and lower body.  Head/face:  Atraumatic, normocephalic.  Cor: regular rate  Pulm: non-labored breathing  GI: Abdomen non-distended and non-tender.  Back: Straight leg raising in the sitting and supine positions is positive to radicular pain. No pain to palpation over the spine or paraspinal muscles. Normal range of motion without pain reproduction.  Extremities: Peripheral joint ROM is full and pain free without obvious instability or laxity in all four extremities. No deformities, edema, or skin discoloration. Good capillary refill.  Musculoskeletal: hip, sacroiliac and knee provocative maneuvers are negative. Positive right and left GTB tenderness. TTP over left piriformis with positive piriformis " stretch test.  Bilateral upper and lower extremity strength is normal and symmetric.  No atrophy or tone abnormalities are noted.  Neuro: Bilateral upper and lower extremity coordination and muscle stretch reflexes are physiologic and symmetric.  Negative Clonus. No loss of sensation is noted.  Gait: Normal.    CMP  Sodium   Date Value Ref Range Status   02/23/2024 144 136 - 145 mmol/L Final     Potassium   Date Value Ref Range Status   02/23/2024 3.8 3.5 - 5.1 mmol/L Final     Chloride   Date Value Ref Range Status   02/23/2024 106 95 - 110 mmol/L Final     CO2   Date Value Ref Range Status   02/23/2024 29 23 - 29 mmol/L Final     Glucose   Date Value Ref Range Status   02/23/2024 112 (H) 70 - 110 mg/dL Final     BUN   Date Value Ref Range Status   02/23/2024 21 8 - 23 mg/dL Final     Creatinine   Date Value Ref Range Status   02/23/2024 1.9 (H) 0.5 - 1.4 mg/dL Final     Calcium   Date Value Ref Range Status   02/23/2024 9.9 8.7 - 10.5 mg/dL Final     Total Protein   Date Value Ref Range Status   01/17/2024 7.9 6.0 - 8.4 g/dL Final     Albumin   Date Value Ref Range Status   01/17/2024 3.7 3.5 - 5.2 g/dL Final     Total Bilirubin   Date Value Ref Range Status   01/17/2024 0.6 0.1 - 1.0 mg/dL Final     Comment:     For infants and newborns, interpretation of results should be based  on gestational age, weight and in agreement with clinical  observations.    Premature Infant recommended reference ranges:  Up to 24 hours.............<8.0 mg/dL  Up to 48 hours............<12.0 mg/dL  3-5 days..................<15.0 mg/dL  6-29 days.................<15.0 mg/dL       Alkaline Phosphatase   Date Value Ref Range Status   01/17/2024 72 55 - 135 U/L Final     AST   Date Value Ref Range Status   01/17/2024 26 10 - 40 U/L Final     ALT   Date Value Ref Range Status   01/17/2024 39 10 - 44 U/L Final     Anion Gap   Date Value Ref Range Status   02/23/2024 9 8 - 16 mmol/L Final     eGFR   Date Value Ref Range Status   02/23/2024  29 (A) >60 mL/min/1.73 m^2 Final   11/21/2023 31 (L) > OR = 60 mL/min/1.73m2 Final         ASSESSMENT: 68 y.o. year old female with buttock/hip pain, consistent with:    1. Sacroiliitis        2. Greater trochanteric bursitis, unspecified laterality        3. Myofascial pain syndrome        4. Piriformis syndrome, unspecified laterality                IMPRESSION: Riley Marcus presents today for bilateral hip and bilateral buttock.  She is s/p bilateral piriformis injection on 1/18/24 with 100% relief of her shooting leg pain for 3 weeks.  She continues to have relief of her shooting leg pain, but now has pain in her buttocks and bilateral hips.  History and physical exam are consistent with lumbar radiculopathy, bilateral sacroiliitis, and bilateral greater trochanteric bursitis.  Imaging is consistent with lumbar degenerative disc disease.  At this point, it seems that her sacroiliitis and greater trochanteric bursitis seem to be bothering more than her radicular pain.  She is actively participating in home exercises, but has failed to achieve significant benefit for her pain.  We will consider her for additional interventions to give her additional pain relief.    PLAN:   - I have stressed the importance of physical activity and a home exercise plan to help with pain and improve health.  - Patient can continue with medications for now since they are providing benefits, using them appropriately, and without side effects.  - We can repeat SIJ injections as needed.  - GTB injections may take another 2 weeks to see results.   - She can take Tylenol Extra Strength (500 mg tablets)  Can take 2 Tablets (1000 mg total) three times per day for a total daily dose of 3000 mg  - continue robaxin 500mg as needed for muscular  - Continue lidocaine patch for pain daily PRN  - Provided patient with lower extremity stretching exercises and hip bursitis exercises to complete daily.  Demonstrated for patient in clinic.  -  Counseled patient regarding the importance of activity modification and physical therapy.  - RTC 4 weeks, consider formal PT.    The above plan and management options were discussed at length with patient. Patient is in agreement with the above and verbalized understanding.    Lacy Duval  03/21/2024    I spent a total of 30 minutes on the day of the visit.  This includes face to face time and non-face to face time preparing to see the patient by reviewing previous labs/imaging, obtaining and/or reviewing separately obtained history, documenting clinical information in the electronic or other health record, independently interpreting results and communicating results to the patient/family/caregiver.

## 2024-04-11 ENCOUNTER — LAB VISIT (OUTPATIENT)
Dept: LAB | Facility: HOSPITAL | Age: 69
End: 2024-04-11
Attending: INTERNAL MEDICINE
Payer: MEDICARE

## 2024-04-11 DIAGNOSIS — E79.0 HYPERURICEMIA: ICD-10-CM

## 2024-04-11 DIAGNOSIS — N18.32 STAGE 3B CHRONIC KIDNEY DISEASE: ICD-10-CM

## 2024-04-11 DIAGNOSIS — R80.9 MICROALBUMINURIA: ICD-10-CM

## 2024-04-11 DIAGNOSIS — I10 PRIMARY HYPERTENSION: ICD-10-CM

## 2024-04-11 DIAGNOSIS — Z11.4 ENCOUNTER FOR SCREENING FOR HIV: ICD-10-CM

## 2024-04-11 DIAGNOSIS — E55.9 VITAMIN D DEFICIENCY: ICD-10-CM

## 2024-04-11 DIAGNOSIS — M10.9 GOUT, UNSPECIFIED CAUSE, UNSPECIFIED CHRONICITY, UNSPECIFIED SITE: ICD-10-CM

## 2024-04-11 DIAGNOSIS — N25.81 SECONDARY HYPERPARATHYROIDISM OF RENAL ORIGIN: ICD-10-CM

## 2024-04-11 DIAGNOSIS — Z01.89 ENCOUNTER FOR SPECIAL EXAMINATION: ICD-10-CM

## 2024-04-11 DIAGNOSIS — N18.30 STAGE 3 CHRONIC KIDNEY DISEASE, UNSPECIFIED WHETHER STAGE 3A OR 3B CKD: ICD-10-CM

## 2024-04-11 LAB
ALBUMIN SERPL BCP-MCNC: 3.7 G/DL (ref 3.5–5.2)
ALP SERPL-CCNC: 66 U/L (ref 55–135)
ALT SERPL W/O P-5'-P-CCNC: 26 U/L (ref 10–44)
ANION GAP SERPL CALC-SCNC: 13 MMOL/L (ref 8–16)
AST SERPL-CCNC: 20 U/L (ref 10–40)
BASOPHILS # BLD AUTO: 0.04 K/UL (ref 0–0.2)
BASOPHILS NFR BLD: 0.6 % (ref 0–1.9)
BILIRUB SERPL-MCNC: 0.6 MG/DL (ref 0.1–1)
BUN SERPL-MCNC: 19 MG/DL (ref 8–23)
CALCIUM SERPL-MCNC: 10.5 MG/DL (ref 8.7–10.5)
CHLORIDE SERPL-SCNC: 103 MMOL/L (ref 95–110)
CO2 SERPL-SCNC: 27 MMOL/L (ref 23–29)
CREAT SERPL-MCNC: 2 MG/DL (ref 0.5–1.4)
DIFFERENTIAL METHOD BLD: ABNORMAL
EOSINOPHIL # BLD AUTO: 0.1 K/UL (ref 0–0.5)
EOSINOPHIL NFR BLD: 1.7 % (ref 0–8)
ERYTHROCYTE [DISTWIDTH] IN BLOOD BY AUTOMATED COUNT: 13.1 % (ref 11.5–14.5)
EST. GFR  (NO RACE VARIABLE): 27 ML/MIN/1.73 M^2
FERRITIN SERPL-MCNC: 75 NG/ML (ref 20–300)
GLUCOSE SERPL-MCNC: 109 MG/DL (ref 70–110)
HCT VFR BLD AUTO: 44.1 % (ref 37–48.5)
HGB BLD-MCNC: 14 G/DL (ref 12–16)
IMM GRANULOCYTES # BLD AUTO: 0.01 K/UL (ref 0–0.04)
IMM GRANULOCYTES NFR BLD AUTO: 0.2 % (ref 0–0.5)
IRON SERPL-MCNC: 102 UG/DL (ref 30–160)
LYMPHOCYTES # BLD AUTO: 1 K/UL (ref 1–4.8)
LYMPHOCYTES NFR BLD: 15.5 % (ref 18–48)
MAGNESIUM SERPL-MCNC: 2 MG/DL (ref 1.6–2.6)
MCH RBC QN AUTO: 30.4 PG (ref 27–31)
MCHC RBC AUTO-ENTMCNC: 31.7 G/DL (ref 32–36)
MCV RBC AUTO: 96 FL (ref 82–98)
MONOCYTES # BLD AUTO: 0.5 K/UL (ref 0.3–1)
MONOCYTES NFR BLD: 7.8 % (ref 4–15)
NEUTROPHILS # BLD AUTO: 4.9 K/UL (ref 1.8–7.7)
NEUTROPHILS NFR BLD: 74.2 % (ref 38–73)
NRBC BLD-RTO: 0 /100 WBC
PHOSPHATE SERPL-MCNC: 3.9 MG/DL (ref 2.7–4.5)
PLATELET # BLD AUTO: 255 K/UL (ref 150–450)
PMV BLD AUTO: 11 FL (ref 9.2–12.9)
POTASSIUM SERPL-SCNC: 4.6 MMOL/L (ref 3.5–5.1)
PROT SERPL-MCNC: 7.7 G/DL (ref 6–8.4)
PTH-INTACT SERPL-MCNC: 170.8 PG/ML (ref 9–77)
RBC # BLD AUTO: 4.61 M/UL (ref 4–5.4)
SATURATED IRON: 25 % (ref 20–50)
SODIUM SERPL-SCNC: 143 MMOL/L (ref 136–145)
TOTAL IRON BINDING CAPACITY: 416 UG/DL (ref 250–450)
TRANSFERRIN SERPL-MCNC: 281 MG/DL (ref 200–375)
URATE SERPL-MCNC: 6.8 MG/DL (ref 2.4–5.7)
WBC # BLD AUTO: 6.64 K/UL (ref 3.9–12.7)

## 2024-04-11 PROCEDURE — 84156 ASSAY OF PROTEIN URINE: CPT | Performed by: INTERNAL MEDICINE

## 2024-04-11 PROCEDURE — 87389 HIV-1 AG W/HIV-1&-2 AB AG IA: CPT | Performed by: INTERNAL MEDICINE

## 2024-04-11 PROCEDURE — 86160 COMPLEMENT ANTIGEN: CPT | Mod: 59 | Performed by: INTERNAL MEDICINE

## 2024-04-11 PROCEDURE — 82728 ASSAY OF FERRITIN: CPT | Performed by: INTERNAL MEDICINE

## 2024-04-11 PROCEDURE — 84165 PROTEIN E-PHORESIS SERUM: CPT | Mod: 26,,, | Performed by: PATHOLOGY

## 2024-04-11 PROCEDURE — 84165 PROTEIN E-PHORESIS SERUM: CPT | Performed by: INTERNAL MEDICINE

## 2024-04-11 PROCEDURE — 83735 ASSAY OF MAGNESIUM: CPT | Performed by: INTERNAL MEDICINE

## 2024-04-11 PROCEDURE — 80074 ACUTE HEPATITIS PANEL: CPT | Performed by: INTERNAL MEDICINE

## 2024-04-11 PROCEDURE — 86334 IMMUNOFIX E-PHORESIS SERUM: CPT | Mod: 26,,, | Performed by: PATHOLOGY

## 2024-04-11 PROCEDURE — 86036 ANCA SCREEN EACH ANTIBODY: CPT | Performed by: INTERNAL MEDICINE

## 2024-04-11 PROCEDURE — 80053 COMPREHEN METABOLIC PANEL: CPT | Performed by: INTERNAL MEDICINE

## 2024-04-11 PROCEDURE — 83521 IG LIGHT CHAINS FREE EACH: CPT | Mod: 59 | Performed by: INTERNAL MEDICINE

## 2024-04-11 PROCEDURE — 86592 SYPHILIS TEST NON-TREP QUAL: CPT | Performed by: INTERNAL MEDICINE

## 2024-04-11 PROCEDURE — 86225 DNA ANTIBODY NATIVE: CPT | Performed by: INTERNAL MEDICINE

## 2024-04-11 PROCEDURE — 84550 ASSAY OF BLOOD/URIC ACID: CPT | Performed by: INTERNAL MEDICINE

## 2024-04-11 PROCEDURE — 81000 URINALYSIS NONAUTO W/SCOPE: CPT | Performed by: INTERNAL MEDICINE

## 2024-04-11 PROCEDURE — 83970 ASSAY OF PARATHORMONE: CPT | Performed by: INTERNAL MEDICINE

## 2024-04-11 PROCEDURE — 86038 ANTINUCLEAR ANTIBODIES: CPT | Performed by: INTERNAL MEDICINE

## 2024-04-11 PROCEDURE — 82306 VITAMIN D 25 HYDROXY: CPT | Performed by: INTERNAL MEDICINE

## 2024-04-11 PROCEDURE — 85025 COMPLETE CBC W/AUTO DIFF WBC: CPT | Performed by: INTERNAL MEDICINE

## 2024-04-11 PROCEDURE — 83540 ASSAY OF IRON: CPT | Performed by: INTERNAL MEDICINE

## 2024-04-11 PROCEDURE — 86334 IMMUNOFIX E-PHORESIS SERUM: CPT | Performed by: INTERNAL MEDICINE

## 2024-04-11 PROCEDURE — 84166 PROTEIN E-PHORESIS/URINE/CSF: CPT | Performed by: INTERNAL MEDICINE

## 2024-04-11 PROCEDURE — 36415 COLL VENOUS BLD VENIPUNCTURE: CPT | Performed by: INTERNAL MEDICINE

## 2024-04-11 PROCEDURE — 86160 COMPLEMENT ANTIGEN: CPT | Performed by: INTERNAL MEDICINE

## 2024-04-11 PROCEDURE — 86335 IMMUNFIX E-PHORSIS/URINE/CSF: CPT | Performed by: INTERNAL MEDICINE

## 2024-04-11 PROCEDURE — 84100 ASSAY OF PHOSPHORUS: CPT | Performed by: INTERNAL MEDICINE

## 2024-04-11 RX ORDER — AMLODIPINE BESYLATE 5 MG/1
5 TABLET ORAL DAILY
Qty: 90 TABLET | Refills: 3 | OUTPATIENT
Start: 2024-04-11 | End: 2025-04-11

## 2024-04-11 NOTE — TELEPHONE ENCOUNTER
No care due was identified.  [facility] Embedded Care Due Messages. Reference number: [id number].   4/11/2024 3:01:30 PM CDT

## 2024-04-12 DIAGNOSIS — I10 PRIMARY HYPERTENSION: ICD-10-CM

## 2024-04-12 LAB
25(OH)D3+25(OH)D2 SERPL-MCNC: 45 NG/ML (ref 30–96)
C3 SERPL-MCNC: 161 MG/DL (ref 50–180)
C4 SERPL-MCNC: 47 MG/DL (ref 11–44)
HAV IGM SERPL QL IA: NORMAL
HBV CORE IGM SERPL QL IA: NORMAL
HBV SURFACE AG SERPL QL IA: NORMAL
HCV AB SERPL QL IA: NORMAL
HIV 1+2 AB+HIV1 P24 AG SERPL QL IA: NORMAL

## 2024-04-12 NOTE — TELEPHONE ENCOUNTER
No care due was identified.  [facility] Embedded Care Due Messages. Reference number: [id number].   4/12/2024 12:36:49 PM CDT

## 2024-04-13 LAB — RPR SER QL: NORMAL

## 2024-04-13 RX ORDER — AMLODIPINE BESYLATE 5 MG/1
5 TABLET ORAL DAILY
Qty: 90 TABLET | Refills: 3 | OUTPATIENT
Start: 2024-04-13 | End: 2025-04-13

## 2024-04-13 NOTE — TELEPHONE ENCOUNTER
Refill Decision Note    Henrik OCHOA. Previously Denied      Riley Marcus  is requesting a refill authorization.  Brief Assessment and Rationale for Refill:  Quick Discontinue     Medication Therapy Plan: Patient needs an appointment per refusal on 4/11/24      Comments:     Note composed:6:20 PM 04/13/2024

## 2024-04-15 ENCOUNTER — HOSPITAL ENCOUNTER (OUTPATIENT)
Dept: RADIOLOGY | Facility: HOSPITAL | Age: 69
Discharge: HOME | End: 2024-04-15
Attending: INTERNAL MEDICINE
Payer: MEDICARE

## 2024-04-15 DIAGNOSIS — N18.32 STAGE 3B CHRONIC KIDNEY DISEASE: ICD-10-CM

## 2024-04-15 LAB
ALBUMIN SERPL ELPH-MCNC: 4.16 G/DL (ref 3.35–5.55)
ALPHA1 GLOB SERPL ELPH-MCNC: 0.32 G/DL (ref 0.17–0.41)
ALPHA2 GLOB SERPL ELPH-MCNC: 0.78 G/DL (ref 0.43–0.99)
ANA SER QL IF: NORMAL
ANCA AB TITR SER IF: NORMAL TITER
B-GLOBULIN SERPL ELPH-MCNC: 1.02 G/DL (ref 0.5–1.1)
BILIRUB UR QL STRIP: NEGATIVE
CLARITY UR: CLEAR
COLOR UR: COLORLESS
CREAT UR-MCNC: 33.3 MG/DL (ref 15–325)
DSDNA AB SER-ACNC: NORMAL [IU]/ML
GAMMA GLOB SERPL ELPH-MCNC: 1.22 G/DL (ref 0.67–1.58)
GLUCOSE UR QL STRIP: NEGATIVE
HGB UR QL STRIP: NEGATIVE
INTERPRETATION SERPL IFE-IMP: NORMAL
KAPPA LC SER QL IA: 4.4 MG/DL (ref 0.33–1.94)
KAPPA LC/LAMBDA SER IA: 1.86 (ref 0.26–1.65)
KETONES UR QL STRIP: NEGATIVE
LAMBDA LC SER QL IA: 2.37 MG/DL (ref 0.57–2.63)
LEUKOCYTE ESTERASE UR QL STRIP: NEGATIVE
MICROSCOPIC COMMENT: NORMAL
NITRITE UR QL STRIP: NEGATIVE
P-ANCA TITR SER IF: NORMAL TITER
PH UR STRIP: 7 [PH] (ref 5–8)
PROT SERPL-MCNC: 7.5 G/DL (ref 6–8.4)
PROT UR QL STRIP: NEGATIVE
PROT UR-MCNC: 17 MG/DL
PROT/CREAT UR: 0.51 MG/G{CREAT} (ref 0–0.2)
SP GR UR STRIP: 1.01 (ref 1–1.03)
URN SPEC COLLECT METH UR: ABNORMAL
UROBILINOGEN UR STRIP-ACNC: NEGATIVE EU/DL

## 2024-04-15 PROCEDURE — 86335 IMMUNFIX E-PHORSIS/URINE/CSF: CPT | Mod: 26,,, | Performed by: PATHOLOGY

## 2024-04-15 PROCEDURE — 76770 US EXAM ABDO BACK WALL COMP: CPT | Mod: TC

## 2024-04-15 PROCEDURE — 76770 US EXAM ABDO BACK WALL COMP: CPT | Mod: 26,,, | Performed by: RADIOLOGY

## 2024-04-15 PROCEDURE — 84166 PROTEIN E-PHORESIS/URINE/CSF: CPT | Mod: 26,,, | Performed by: PATHOLOGY

## 2024-04-16 VITALS — DIASTOLIC BLOOD PRESSURE: 86 MMHG | SYSTOLIC BLOOD PRESSURE: 148 MMHG

## 2024-04-16 LAB
INTERPRETATION UR IFE-IMP: NORMAL
PATHOLOGIST INTERPRETATION IFE: NORMAL
PATHOLOGIST INTERPRETATION SPE: NORMAL

## 2024-04-16 RX ORDER — AMLODIPINE BESYLATE 5 MG/1
5 TABLET ORAL DAILY
Qty: 90 TABLET | Refills: 0 | Status: SHIPPED | OUTPATIENT
Start: 2024-04-16 | End: 2024-04-17 | Stop reason: SDUPTHER

## 2024-04-16 NOTE — TELEPHONE ENCOUNTER
BP is too high.  90 pills sent but patient needs to schedule an appointment.  Please assist with this process.

## 2024-04-17 LAB — PATHOLOGIST INTERPRETATION UIFE: NORMAL

## 2024-04-18 ENCOUNTER — CLINICAL SUPPORT (OUTPATIENT)
Dept: OPHTHALMOLOGY | Facility: CLINIC | Age: 69
End: 2024-04-18
Payer: MEDICARE

## 2024-04-18 ENCOUNTER — OFFICE VISIT (OUTPATIENT)
Dept: OPTOMETRY | Facility: CLINIC | Age: 69
End: 2024-04-18
Payer: MEDICARE

## 2024-04-18 DIAGNOSIS — H40.053 OCULAR HYPERTENSION, BILATERAL: Primary | ICD-10-CM

## 2024-04-18 DIAGNOSIS — H40.053 OCULAR HYPERTENSION, BILATERAL: ICD-10-CM

## 2024-04-18 LAB — PROT PATTERN UR ELPH-IMP: NORMAL

## 2024-04-18 PROCEDURE — 3066F NEPHROPATHY DOC TX: CPT | Mod: CPTII,S$GLB,, | Performed by: OPTOMETRIST

## 2024-04-18 PROCEDURE — 1101F PT FALLS ASSESS-DOCD LE1/YR: CPT | Mod: CPTII,S$GLB,, | Performed by: OPTOMETRIST

## 2024-04-18 PROCEDURE — 1159F MED LIST DOCD IN RCRD: CPT | Mod: CPTII,S$GLB,, | Performed by: OPTOMETRIST

## 2024-04-18 PROCEDURE — 76514 ECHO EXAM OF EYE THICKNESS: CPT | Mod: S$GLB,,, | Performed by: OPTOMETRIST

## 2024-04-18 PROCEDURE — 99213 OFFICE O/P EST LOW 20 MIN: CPT | Mod: S$GLB,,, | Performed by: OPTOMETRIST

## 2024-04-18 PROCEDURE — 4010F ACE/ARB THERAPY RXD/TAKEN: CPT | Mod: CPTII,S$GLB,, | Performed by: OPTOMETRIST

## 2024-04-18 PROCEDURE — 1160F RVW MEDS BY RX/DR IN RCRD: CPT | Mod: CPTII,S$GLB,, | Performed by: OPTOMETRIST

## 2024-04-18 PROCEDURE — 3288F FALL RISK ASSESSMENT DOCD: CPT | Mod: CPTII,S$GLB,, | Performed by: OPTOMETRIST

## 2024-04-18 PROCEDURE — 1126F AMNT PAIN NOTED NONE PRSNT: CPT | Mod: CPTII,S$GLB,, | Performed by: OPTOMETRIST

## 2024-04-18 PROCEDURE — 99999 PR PBB SHADOW E&M-EST. PATIENT-LVL IV: CPT | Mod: PBBFAC,,, | Performed by: OPTOMETRIST

## 2024-04-18 NOTE — PROGRESS NOTES
HVF/OCT rel/fix poor OD good OS coop good OU /chart checked for latex allergy/0.50 OD 0 + 1.00 x 030 OS - BJ

## 2024-04-19 ENCOUNTER — TELEPHONE (OUTPATIENT)
Dept: FAMILY MEDICINE | Facility: CLINIC | Age: 69
End: 2024-04-19
Payer: MEDICARE

## 2024-04-19 LAB — PATHOLOGIST INTERPRETATION UPE: NORMAL

## 2024-04-19 NOTE — PROGRESS NOTES
ANNELISE    COLEMAN: 03/24  Chief complaint (CC): Patient is here for a follow up today with   HVF,OCT,pachymetry, refraction and IOP check.  Patient feel the AT's   helped a little with dryness but vision seems about the same.  Glasses? + 3 yrs. old  Contacts? -  H/o eye surgery, injections or laser: Lasik OU, injections OS  H/o eye injury: -  Known eye conditions? See above  Family h/o eye conditions? Sister is glaucoma suspect  Eye gtts? AT's BID OU      (-) Flashes (-)  Floaters (-) Mucous   (-)  Tearing (-) Itching (-) Burning   (-) Headaches (-) Eye Pain/discomfort (-) Irritation   (-)  Redness (-) Double vision (+) Blurry vision    Diabetic? -  A1c? -      Last edited by Estephanie Silverio on 4/18/2024  9:28 AM.            Assessment /Plan     For exam results, see Encounter Report.    Ocular hypertension, bilateral      (-) FHx. IOP 17 OD, OS. Last 24 OD, 22 OS. C/d 0.5 OD, 0.4 OS. Pachy 534 OD, 537 OS.  4/18/2024 OCT WNL OU  4/18/2024 HVF OD borderline w/low reliability, OS WNL  Educated pt on findings w/understanding.  No e/o glaucoma  RTC 1 year.

## 2024-04-19 NOTE — TELEPHONE ENCOUNTER
Attempted to call patient to tell patient Dr. Duncan sent in 90 day supply of Amlodipine on the 16th but her blood pressure is too high and she needs to come in for an appointment. Previously sent [employer] message stating this but patient has not read it. Patient did not answer phone, left voicemail stating this and asking patient to return call to clinic to schedule appointment.

## 2024-04-23 ENCOUNTER — HOSPITAL ENCOUNTER (OUTPATIENT)
Dept: RADIOLOGY | Facility: OTHER | Age: 69
Discharge: HOME | End: 2024-04-23
Payer: MEDICARE

## 2024-04-23 ENCOUNTER — OFFICE VISIT (OUTPATIENT)
Dept: SPINE | Facility: CLINIC | Age: 69
End: 2024-04-23
Payer: MEDICARE

## 2024-04-23 VITALS
HEIGHT: 62 IN | DIASTOLIC BLOOD PRESSURE: 70 MMHG | HEART RATE: 68 BPM | SYSTOLIC BLOOD PRESSURE: 140 MMHG | BODY MASS INDEX: 32.22 KG/M2 | RESPIRATION RATE: 18 BRPM | WEIGHT: 175.06 LBS | OXYGEN SATURATION: 100 %

## 2024-04-23 DIAGNOSIS — M70.61 GREATER TROCHANTERIC BURSITIS OF BOTH HIPS: ICD-10-CM

## 2024-04-23 DIAGNOSIS — M46.1 SACROILIITIS: ICD-10-CM

## 2024-04-23 DIAGNOSIS — M70.70 ISCHIAL BURSITIS, UNSPECIFIED LATERALITY: Primary | ICD-10-CM

## 2024-04-23 DIAGNOSIS — M70.62 GREATER TROCHANTERIC BURSITIS OF BOTH HIPS: ICD-10-CM

## 2024-04-23 DIAGNOSIS — M76.31 ILIOTIBIAL BAND SYNDROME OF BOTH SIDES: ICD-10-CM

## 2024-04-23 DIAGNOSIS — M76.32 ILIOTIBIAL BAND SYNDROME OF BOTH SIDES: ICD-10-CM

## 2024-04-23 DIAGNOSIS — M70.70 ISCHIAL BURSITIS, UNSPECIFIED LATERALITY: ICD-10-CM

## 2024-04-23 PROCEDURE — 1159F MED LIST DOCD IN RCRD: CPT | Mod: CPTII,S$GLB,,

## 2024-04-23 PROCEDURE — 1125F AMNT PAIN NOTED PAIN PRSNT: CPT | Mod: CPTII,S$GLB,,

## 2024-04-23 PROCEDURE — 99999 PR PBB SHADOW E&M-EST. PATIENT-LVL V: CPT | Mod: PBBFAC,,,

## 2024-04-23 PROCEDURE — 3008F BODY MASS INDEX DOCD: CPT | Mod: CPTII,S$GLB,,

## 2024-04-23 PROCEDURE — 1101F PT FALLS ASSESS-DOCD LE1/YR: CPT | Mod: CPTII,S$GLB,,

## 2024-04-23 PROCEDURE — 4010F ACE/ARB THERAPY RXD/TAKEN: CPT | Mod: CPTII,S$GLB,,

## 2024-04-23 PROCEDURE — 3077F SYST BP >= 140 MM HG: CPT | Mod: CPTII,S$GLB,,

## 2024-04-23 PROCEDURE — 99214 OFFICE O/P EST MOD 30 MIN: CPT | Mod: S$GLB,,,

## 2024-04-23 PROCEDURE — 73521 X-RAY EXAM HIPS BI 2 VIEWS: CPT | Mod: 26,,, | Performed by: RADIOLOGY

## 2024-04-23 PROCEDURE — 1160F RVW MEDS BY RX/DR IN RCRD: CPT | Mod: CPTII,S$GLB,,

## 2024-04-23 PROCEDURE — 3078F DIAST BP <80 MM HG: CPT | Mod: CPTII,S$GLB,,

## 2024-04-23 PROCEDURE — 3066F NEPHROPATHY DOC TX: CPT | Mod: CPTII,S$GLB,,

## 2024-04-23 PROCEDURE — 3288F FALL RISK ASSESSMENT DOCD: CPT | Mod: CPTII,S$GLB,,

## 2024-04-23 PROCEDURE — 73521 X-RAY EXAM HIPS BI 2 VIEWS: CPT | Mod: TC,FY

## 2024-04-23 NOTE — PATIENT INSTRUCTIONS
"To get and use a foam roller for your IT band:     Type "foam roller for it band" into "[employer]". A video named "How to Use a Foam Roller to Release Your IT Band" (featuring a woman in a red shirt) will come up on how to use a foam roller to stretch your IT band. This will help with your bursitis. Also, links will appear where you can order a foam roller to use. Recommend purchasing one around $20 that is smooth (no lumps or grooves). I recommend the [employer] Foam Roller, 18", which is available from multiple retailers for around $20.            "

## 2024-04-23 NOTE — PROGRESS NOTES
Chronic patient Established Note (Follow up visit)      SUBJECTIVE:    INTERVAL HISTORY 4/23/2024:  Riley Marcus returns to clinic for follow-up of chronic pain.  Since her last visit, she states the GTB pain has improved with stretches provided.  She also denies sciatic nerve pain since piriformis injections. She continues to work on stretches and exercises most days and goes to the gym 3 times per week.  She does continue to endorse bilateral lower buttock pain at the bony prominences of pelvis.  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  She continues to take Tylenol extra strength and robaxin as needed with good relief.  She also takes ibuprofen despite known impaired renal function.  She does not want additional interventions for formal physical therapy referral at this time.  She denies fever/night sweats, urinary incontinence, bowel incontinence, significant weight changes, significant motor weakness or changes, or loss of sensations. Her pain today is 7/10.     INTERVAL HISTORY 3/21/2024:  Riley Marcus presents for follow-up s/p Bilateral SIJ and Bilateral GTB on 3/7/2024.  She states this has provided 70% relief to her SIJ and 0% to the GTB. She continues robaxin 500 mg TID PRN and Lidocaine patches as needed with  moderate relief.  She denies any perceived side effects.   The patient denies fever/night sweats, urinary incontinence, bowel incontinence, significant weight changes, significant motor weakness or changes, or loss of sensations.  Her pain today is 7/10    INTERVAL HISTORY 2/26/2024:  Riley Marcus presents to the clinic for a follow-up appointment for chronic pain. Current pain intensity is 8/10.  Since the last visit, Riley Marcus states:  after her piriformis injection, she had 100% pain relief which lasted 3 weeks.  She however states that the pain has returned, and is worse in the morning.  It tends to feel better as the day  progresses.  She continues to go to the gym 3 times a week and actively participates in a home exercise program to help her pain.      PRIOR HISTORY:   Riley Marcus presents to the clinic for the evaluation of lower back and bilateral buttocks pain. The pain started many years ago following no inciting event and symptoms have been worsening.The pain is located in the lower back area and radiates to the bilateral buttocks.  The pain is described as sharp and is rated as 8/10. The pain is rated with a score of  5/10 on the BEST day and a score of 8/10 on the WORST day.  Symptoms interfere with daily activity. The pain is exacerbated by walking, kneeling (putting close together), standing.  The pain is mitigated by moving legs far apart. The patient reports 7-8 hours of uninterrupted sleep per night.    Patient denies urinary incontinence, bowel incontinence, and significant motor weakness. She had a caudal MELL in 2015 which she didn't find beneficial    Physical Therapy/Home Exercise: yes, completed in October 2023. Continuing to do home exercise program.      Pain Disability Index Review:      3/21/2024     1:27 PM 12/11/2023     8:15 AM   Last 3 PDI Scores   Pain Disability Index (PDI) 32 56         Pain Medications:   - tylenol extended release   - ibuprofen     report:  Reviewed and consistent with medication use as prescribed.  03/11/2022 03/11/2022 1 Oxycodone-Acetaminophn 7.5-325 16.00 4 [facility]       Pain Procedures:   11/23/2015: Caudal MELL ([place])  1/18/2024: Bilateral piriformis 100% for 3 weeks (still with resolution of sciatica pain)  3/7/2024 - Bilateral SIJ and Bilateral GTB - 70% in the SIJ;  50% in GTB     Imaging:   MRI LUMBAR SPINE WITHOUT CONTRAST     CLINICAL HISTORY:  Low back pain, symptoms persist with > 6wks conservative treatment; Lumbago with sciatica, right side     TECHNIQUE:  Multiplanar, multisequence MR images were acquired from the thoracolumbar junction to the sacrum without the  administration of contrast.     COMPARISON:  10/21/2019     FINDINGS:  Alignment: Grade 1 anterolisthesis of L3-4.     Vertebrae: 5 lumbar-type vertebral bodies. No aggressive marrow replacement process or fracture.Nobone marrow edema .     Discs: Degenerative changes L2-L5 disc space levels with disc space narrowing L4-5.     Cord: Normal. Conus terminates at L1.     Degenerative findings:     T12-L1: There is no focal disc herniation. No significant central canal narrowing . No significant neural foraminal narrowing.     L1-L2: There is no focal disc herniation. No significant central canal narrowing . No significant neural foraminal narrowing.     L2-L3: There is no focal disc herniation.Moderate hypertrophic changes of facets/ligamentum flavum thickening. Mild central canal narrowing . No significant neural foraminal narrowing.     L3-L4: There is no focal disc herniation.Broad-based disc bulge, facet degenerative change and ligamentum flavum thickening which contribute to  severe central canal narrowing .  Moderate right and mild left neural foraminal narrowing.     L4-L5: There is no focal disc herniation.Broad-based disc bulge, facet degenerative change and ligamentum flavum thickening which contribute to  severe central canal narrowing . Moderate right and mild left neural foraminal narrowing.     L5-S1: There is no focal disc herniation.Mild hypertrophic changes of facets/ligamentum flavum thickening. Mild central canal narrowing . No significant neural foraminal narrowing.     Paraspinal muscles & soft tissues: Unremarkable.     Impression:     Lumbar spondylosis L2-L5 worst at L3-4 and L4-5.        Electronically signed by: De Ross MD  Date:                                            11/30/2023  Time:                                           09:36    Allergies:   Review of patient's allergies indicates:   Allergen Reactions    Sulfa (sulfonamide antibiotics) Itching and Rash     Other  reaction(s): Rash       Current Medications:   Current Outpatient Medications   Medication Sig Dispense Refill    acetaminophen (TYLENOL) 500 MG tablet Take 500 mg by mouth every 6 (six) hours as needed for Pain.      amLODIPine (NORVASC) 10 MG tablet Take 1 tablet (10 mg total) by mouth once daily. 90 tablet 3    apple cider vinegar 500 mg Tab       ascorbic acid, vitamin C, (VITAMIN C) 500 MG tablet Take 500 mg by mouth once daily.      aspirin 81 mg Tab Take by mouth. 1 Tablet Oral Every day      B-complex with vitamin C (Z-BEC OR EQUIV) tablet       biotin 1,000 mcg Chew       cetirizine (ZYRTEC) 10 MG tablet Take 10 mg by mouth once daily.      cholecalciferol, vitamin D3, (VITAMIN D3) 25 mcg (1,000 unit) capsule Take 1,000 Units by mouth once daily.      colchicine (COLCRYS) 0.6 mg tablet Take 2 tablets by mouth, then take 1 tablet by mouth 1 hour later; okay to repeat in 24 hours if warranted 6 tablet 0    docusate sodium (COLACE) 100 MG capsule Take 100 mg by mouth once daily.      ezetimibe (ZETIA) 10 mg tablet Take 1 tablet (10 mg total) by mouth once daily. 90 tablet 0    ferrous sulfate (IRON) 325 mg (65 mg iron) Tab tablet       GLUC MAURICE/CHONDRO MAURICE A/VIT C/MN (GLUCOSAMINE-CHONDROIT-VIT C-MN) [id number] mg Tab Take by mouth. 1 Tablet Oral Every day      green tea leaf extract 315 mg Cap Take by mouth. 1 Capsule Oral Every day      hydrALAZINE (APRESOLINE) 25 MG tablet Take 1 tablet (25 mg total) by mouth every 12 (twelve) hours. 180 tablet 1    LIDOcaine (LIDODERM) 5 % Place 1 patch onto the skin once daily. Remove & Discard patch within 12 hours or as directed by MD 30 patch 2    lisinopriL (PRINIVIL,ZESTRIL) 40 MG tablet Take 1 tablet (40 mg total) by mouth once daily. 90 tablet 3    magnesium 250 mg Tab       methocarbamoL (ROBAXIN) 500 MG Tab Take 1 tablet (500 mg total) by mouth 3 (three) times daily as needed. 90 tablet 2    metoprolol succinate (TOPROL-XL) 50 MG 24 hr tablet Take 1 tablet (50 mg  total) by mouth once daily. 90 tablet 3    omega-3 fatty acids 1,000 mg Cap Take by mouth. 1 Capsule Oral Every day      pantoprazole (PROTONIX) 40 MG tablet Take 1 tablet (40 mg total) by mouth once daily. 90 tablet 3    UNABLE TO FIND medication name: Tumeric 1000 mg daily      VITAMIN E,DL-ALPHA TOCOPHEROL, (VITAMIN E, BULK, MISC) by Misc.(Non-Drug; Combo Route) route.       No current facility-administered medications for this visit.       REVIEW OF SYSTEMS:    GENERAL:  No weight loss, malaise or fevers.  HEENT:  Negative for frequent or significant headaches.  NECK:  Negative for lumps, goiter, pain and significant neck swelling.  RESPIRATORY:  Negative for cough, wheezing or shortness of breath.  CARDIOVASCULAR:  Negative for chest pain, leg swelling or palpitations. +HTN  GI:  Negative for abdominal discomfort, blood in stools or black stools or change in bowel habits.  MUSCULOSKELETAL:  See HPI.  SKIN:  Negative for lesions, rash, and itching.  PSYCH:  Negative for sleep disturbance, mood disorder and recent psychosocial stressors.  HEMATOLOGY/LYMPHOLOGY:  Negative for prolonged bleeding, bruising easily or swollen nodes. +ASA 81mg  RENAL: +CKD.  NEURO:   No history of headaches, syncope, paralysis, seizures or tremors.  All other reviewed and negative other than HPI.    Past Medical History:  Past Medical History:   Diagnosis Date    Arthritis     Blood transfusion     Branch retinal vein occlusion of left eye     Cataract     Chronic midline low back pain with bilateral sciatica 09/08/2023    Corneal abrasion 20 yrs    ? eye due to cls     GERD (gastroesophageal reflux disease)     Hyperlipidemia     Hypertension     Lattice degeneration        Past Surgical History:  Past Surgical History:   Procedure Laterality Date    Breast reduction      BREAST SURGERY      COLONOSCOPY N/A 07/20/2022    Procedure: COLONOSCOPY;  Surgeon: Chanda Hawkins MD;  Location: [facility];  Service: Endoscopy;   Laterality: N/A;    ESOPHAGOGASTRODUODENOSCOPY N/A 07/20/2022    Procedure: ESOPHAGOGASTRODUODENOSCOPY (EGD);  Surgeon: Chanda Hawkins MD;  Location[facility];  Service: Endoscopy;  Laterality: N/A;    HYSTERECTOMY      INJECTION Bilateral 01/18/2024    Procedure: INJECTION, BILATERAL PIRIFORMIS;  Surgeon: Raj Pierre MD;  Location: [facility];  Service: Pain Management;  Laterality: Bilateral;  [phone]  2 WK F/U ERIBERTO    INJECTION OF JOINT Bilateral 03/07/2024    Procedure: INJECTION, JOINT BILATERAL SI AND BILATERAL GTB;  Surgeon: Raj Pierre MD;  Location: [facility];  Service: Pain Management;  Laterality: Bilateral;  [phone]  2 WK F/U ERIBERTO    LASER LAPAROSCOPY  1988    LASIK Bilateral     MINI-LAPAROTOMY  1988    OOPHORECTOMY      TOTAL REDUCTION MAMMOPLASTY         Family History:  Family History   Problem Relation Name Age of Onset    Arthritis Mother      Heart disease Mother      Hypertension Mother      Cataracts Mother      Heart disease Father      Hypertension Father      Stroke Father      Diabetes Sister      Hypertension Sister      Hypertension Sister      Eczema Other niece     Arthritis Maternal Grandmother      Heart disease Maternal Grandmother      Hypertension Maternal Grandmother      Arthritis Maternal Grandfather      Arthritis Paternal Grandmother      Heart disease Paternal Grandmother      Arthritis Paternal Grandfather      No Known Problems Brother      No Known Problems Maternal Aunt      No Known Problems Maternal Uncle      No Known Problems Paternal Aunt      No Known Problems Paternal Uncle      Ovarian cancer Neg Hx      Breast cancer Neg Hx      Anxiety disorder Neg Hx      Depression Neg Hx      Suicide Neg Hx      Amblyopia Neg Hx      Blindness Neg Hx      Cancer Neg Hx      Glaucoma Neg Hx      Macular degeneration Neg Hx      Retinal detachment Neg Hx      Strabismus Neg Hx      Thyroid disease Neg Hx      Colon cancer Neg Hx      Esophageal cancer  Neg Hx         Social History:  Social History     Socioeconomic History    Marital status:     Number of children: 0   Occupational History    Occupation: RN     Employer: [employer]   Tobacco Use    Smoking status: Never     Passive exposure: Never    Smokeless tobacco: Never   Substance and Sexual Activity    Alcohol use: Yes     Comment: RARELY    Drug use: No    Sexual activity: Not Currently     Partners: Male   Social History Narrative      several years ago    She has not been sexually-active since    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 [facility]     Social Determinants of Health     Financial Resource Strain: Low Risk  (3/11/2024)    Overall Financial Resource Strain (CARDIA)     Difficulty of Paying Living Expenses: Not hard at all   Food Insecurity: No Food Insecurity (3/11/2024)    Hunger Vital Sign     Worried About Running Out of Food in the Last Year: Never true     Ran Out of Food in the Last Year: Never true   Transportation Needs: No Transportation Needs (3/11/2024)    PRAPARE - Transportation     Lack of Transportation (Medical): No     Lack of Transportation (Non-Medical): No   Physical Activity: Sufficiently Active (3/11/2024)    Exercise Vital Sign     Days of Exercise per Week: 4 days     Minutes of Exercise per Session: 90 min   Stress: No Stress Concern Present (3/11/2024)    [place] citizen [place] of Occupational Health - Occupational Stress Questionnaire     Feeling of Stress : Not at all   Social Connections: Unknown (3/11/2024)    Social Connection and Isolation Panel [NHANES]     Frequency of Communication with Friends and Family: More than three times a week     Frequency of Social Gatherings with Friends and Family: Twice a week     Active Member of Clubs or Organizations: Yes     Attends Club or Organization Meetings: More than 4 times per year     Marital Status:    Housing Stability: Low Risk  (3/11/2024)    Housing  "Stability Vital Sign     Unable to Pay for Housing in the Last Year: No     Number of Places Lived in the Last Year: 1     Unstable Housing in the Last Year: No       OBJECTIVE:    BP (!) 140/70 (BP Location: Right arm, Patient Position: Sitting, BP Method: Medium (Automatic))   Pulse 68   Resp 18   Ht 5' 2" (1.575 m)   Wt 79.4 kg (175 lb 0.7 oz)   SpO2 100%   BMI 32.02 kg/m²     PHYSICAL EXAMINATION:  General appearance: Well appearing, in no acute distress, alert and appropriately communicative.  Psych:  Mood and affect appropriate.  Skin: Skin color, texture, turgor normal, no rashes or lesions, in both upper and lower body.  Head/face:  Atraumatic, normocephalic.  Cor: regular rate  Pulm: non-labored breathing  GI: Abdomen non-distended and non-tender.  Back: Straight leg raising in the sitting positions is negative to radicular pain, but with pain reproduction over bilateral ischial bursa. TTP over bilateral ischial bursa. No pain to palpation over the spine or paraspinal muscles. Normal range of motion without pain reproduction.  Extremities: Peripheral joint ROM is full and pain free without obvious instability or laxity in all four extremities. No deformities, edema, or skin discoloration. Good capillary refill.  Musculoskeletal: hip, sacroiliac and knee provocative maneuvers are negative. Positive right and left GTB tenderness. TTP over left piriformis with positive piriformis stretch test.  TTP over bilateral IT band to knees. Bilateral upper and lower extremity strength is normal and symmetric.  No atrophy or tone abnormalities are noted.  Neuro: Jimenez's absent.  No loss of sensation is noted.  Gait: Normal.    CMP  Sodium   Date Value Ref Range Status   04/11/2024 143 136 - 145 mmol/L Final     Potassium   Date Value Ref Range Status   04/11/2024 4.6 3.5 - 5.1 mmol/L Final     Chloride   Date Value Ref Range Status   04/11/2024 103 95 - 110 mmol/L Final     CO2   Date Value Ref Range Status "   04/11/2024 27 23 - 29 mmol/L Final     Glucose   Date Value Ref Range Status   04/11/2024 109 70 - 110 mg/dL Final     BUN   Date Value Ref Range Status   04/11/2024 19 8 - 23 mg/dL Final     Creatinine   Date Value Ref Range Status   04/11/2024 2.0 (H) 0.5 - 1.4 mg/dL Final     Calcium   Date Value Ref Range Status   04/11/2024 10.5 8.7 - 10.5 mg/dL Final     Total Protein   Date Value Ref Range Status   04/11/2024 7.7 6.0 - 8.4 g/dL Final     Albumin   Date Value Ref Range Status   04/11/2024 3.7 3.5 - 5.2 g/dL Final     Total Bilirubin   Date Value Ref Range Status   04/11/2024 0.6 0.1 - 1.0 mg/dL Final     Comment:     For infants and newborns, interpretation of results should be based  on gestational age, weight and in agreement with clinical  observations.    Premature Infant recommended reference ranges:  Up to 24 hours.............<8.0 mg/dL  Up to 48 hours............<12.0 mg/dL  3-5 days..................<15.0 mg/dL  6-29 days.................<15.0 mg/dL       Alkaline Phosphatase   Date Value Ref Range Status   04/11/2024 66 55 - 135 U/L Final     AST   Date Value Ref Range Status   04/11/2024 20 10 - 40 U/L Final     ALT   Date Value Ref Range Status   04/11/2024 26 10 - 44 U/L Final     Anion Gap   Date Value Ref Range Status   04/11/2024 13 8 - 16 mmol/L Final     eGFR   Date Value Ref Range Status   04/11/2024 27 (A) >60 mL/min/1.73 m^2 Final   11/21/2023 31 (L) > OR = 60 mL/min/1.73m2 Final         ASSESSMENT: 69 y.o. year old female with buttock/hip pain, consistent with:    1. Ischial bursitis, unspecified laterality  X-Ray Hips Bilateral 2 View Incl AP Pelvis      2. Greater trochanteric bursitis of both hips  X-Ray Hips Bilateral 2 View Incl AP Pelvis      3. Iliotibial band syndrome of both sides        4. 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 She presents today for bilateral hip and bilateral buttock pain.   She is s/p bilateral piriformis injection on 1/18/24 with 100% relief of her shooting leg pain for 3 weeks.  She continues to have relief of her shooting leg pain, but now has pain in her buttocks and bilateral hips.  History and physical exam are consistent with lumbar radiculopathy, bilateral sacroiliitis, bilateral greater trochanteric bursitis, and bilateral ischial bursitis.  Imaging is consistent with lumbar degenerative disc disease.  At this point, it seems that her sacroiliitis and greater trochanteric bursitis seem to be bothering more than her radicular pain.  She is actively participating in home exercises, but has failed to achieve significant benefit for her pain.  We will consider her for additional interventions to give her additional pain relief.    PLAN:   - I have stressed the importance of physical activity and a home exercise plan to help with pain and improve health.  - Patient can continue with medications for now since they are providing benefits, using them appropriately, and without side effects.  - X-ray of bilateral hips and pelvis today.  - We can repeat SIJ and piriformis injections as needed.  - Continue Tylenol Extra Strength (500 mg tablets) up to 3000 mg daily PRN  - Continue Robaxin 500mg as needed for muscular  - Continue Lidoderm 5% patches daily as needed  - I do not recommend ibuprofen due to impaired renal function  - Provided patient with Ischialgluteal bursitis stretching exercises and foam rolling exercises to complete daily.  Demonstrated for patient in clinic.  - Counseled patient regarding the importance of activity modification and physical therapy.  - RTC 6 weeks  - Consider Ischial bursitis injections   - Consider repeat GTB injections (to include IT band)    The above plan and management options were discussed at length with patient. Patient is in agreement with the above and verbalized understanding.    Lacy Duval NP   04/23/2024    I spent a total of 30 minutes  on the day of the visit.  This includes face to face time and non-face to face time preparing to see the patient by reviewing previous labs/imaging, obtaining and/or reviewing separately obtained history, documenting clinical information in the electronic or other health record, independently interpreting results and communicating results to the patient/family/caregiver.

## 2024-05-01 ENCOUNTER — PATIENT MESSAGE (OUTPATIENT)
Dept: FAMILY MEDICINE | Facility: CLINIC | Age: 69
End: 2024-05-01
Payer: MEDICARE

## 2024-05-01 DIAGNOSIS — I10 PRIMARY HYPERTENSION: ICD-10-CM

## 2024-05-01 DIAGNOSIS — R73.03 PREDIABETES: ICD-10-CM

## 2024-05-01 DIAGNOSIS — E55.9 VITAMIN D DEFICIENCY: ICD-10-CM

## 2024-05-01 DIAGNOSIS — E78.00 PURE HYPERCHOLESTEROLEMIA: Primary | ICD-10-CM

## 2024-05-01 DIAGNOSIS — E78.2 MIXED HYPERLIPIDEMIA: ICD-10-CM

## 2024-05-08 ENCOUNTER — TELEPHONE (OUTPATIENT)
Dept: FAMILY MEDICINE | Facility: CLINIC | Age: 69
End: 2024-05-08
Payer: MEDICARE

## 2024-05-08 NOTE — TELEPHONE ENCOUNTER
----- Message from Jaquan Ndiaye MA sent at 5/8/2024 10:25 AM CDT -----  Type:  Patient Returning Call    Who Called: Self    Who Left Message for Patient: UNKNOWN    Does the patient know what this is regarding?: NO    Would the patient rather a call back or a response via [facility]? Yes, call     Best Call Back Number: [phone]

## 2024-05-10 ENCOUNTER — LAB VISIT (OUTPATIENT)
Dept: LAB | Facility: HOSPITAL | Age: 69
End: 2024-05-10
Attending: INTERNAL MEDICINE
Payer: MEDICARE

## 2024-05-10 DIAGNOSIS — E78.00 PURE HYPERCHOLESTEROLEMIA: ICD-10-CM

## 2024-05-10 DIAGNOSIS — R73.03 PREDIABETES: ICD-10-CM

## 2024-05-10 DIAGNOSIS — E79.0 HYPERURICEMIA: ICD-10-CM

## 2024-05-10 DIAGNOSIS — M10.9 GOUT, UNSPECIFIED CAUSE, UNSPECIFIED CHRONICITY, UNSPECIFIED SITE: ICD-10-CM

## 2024-05-10 DIAGNOSIS — E55.9 VITAMIN D DEFICIENCY: ICD-10-CM

## 2024-05-10 DIAGNOSIS — I10 PRIMARY HYPERTENSION: ICD-10-CM

## 2024-05-10 DIAGNOSIS — N25.81 SECONDARY HYPERPARATHYROIDISM OF RENAL ORIGIN: ICD-10-CM

## 2024-05-10 DIAGNOSIS — R80.9 MICROALBUMINURIA: ICD-10-CM

## 2024-05-10 DIAGNOSIS — N18.32 STAGE 3B CHRONIC KIDNEY DISEASE: ICD-10-CM

## 2024-05-10 LAB
ALBUMIN SERPL BCP-MCNC: 3.4 G/DL (ref 3.5–5.2)
ALP SERPL-CCNC: 60 U/L (ref 55–135)
ALT SERPL W/O P-5'-P-CCNC: 18 U/L (ref 10–44)
ANION GAP SERPL CALC-SCNC: 6 MMOL/L (ref 8–16)
AST SERPL-CCNC: 17 U/L (ref 10–40)
BACTERIA #/AREA URNS HPF: NORMAL /HPF
BASOPHILS # BLD AUTO: 0.04 K/UL (ref 0–0.2)
BASOPHILS NFR BLD: 0.8 % (ref 0–1.9)
BILIRUB SERPL-MCNC: 0.5 MG/DL (ref 0.1–1)
BILIRUB UR QL STRIP: NEGATIVE
BUN SERPL-MCNC: 30 MG/DL (ref 8–23)
CALCIUM SERPL-MCNC: 10 MG/DL (ref 8.7–10.5)
CHLORIDE SERPL-SCNC: 104 MMOL/L (ref 95–110)
CHOLEST SERPL-MCNC: 207 MG/DL (ref 120–199)
CHOLEST/HDLC SERPL: 4 {RATIO} (ref 2–5)
CLARITY UR: CLEAR
CO2 SERPL-SCNC: 29 MMOL/L (ref 23–29)
COLOR UR: YELLOW
CREAT SERPL-MCNC: 1.7 MG/DL (ref 0.5–1.4)
CREAT UR-MCNC: 121.3 MG/DL (ref 15–325)
CREATININE,URINE: 121.3 MG/DL
DIFFERENTIAL METHOD BLD: ABNORMAL
EOSINOPHIL # BLD AUTO: 0.2 K/UL (ref 0–0.5)
EOSINOPHIL NFR BLD: 3.5 % (ref 0–8)
ERYTHROCYTE [DISTWIDTH] IN BLOOD BY AUTOMATED COUNT: 12.9 % (ref 11.5–14.5)
EST. GFR  (NO RACE VARIABLE): 32 ML/MIN/1.73 M^2
ESTIMATED AVG GLUCOSE: 123 MG/DL (ref 68–131)
GLUCOSE SERPL-MCNC: 103 MG/DL (ref 70–110)
GLUCOSE UR QL STRIP: NEGATIVE
HBA1C MFR BLD: 5.9 % (ref 4–5.6)
HCT VFR BLD AUTO: 37.8 % (ref 37–48.5)
HDLC SERPL-MCNC: 52 MG/DL (ref 40–75)
HDLC SERPL: 25.1 % (ref 20–50)
HGB BLD-MCNC: 12.3 G/DL (ref 12–16)
HGB UR QL STRIP: NEGATIVE
HYALINE CASTS #/AREA URNS LPF: 0 /LPF
IMM GRANULOCYTES # BLD AUTO: 0.01 K/UL (ref 0–0.04)
IMM GRANULOCYTES NFR BLD AUTO: 0.2 % (ref 0–0.5)
KETONES UR QL STRIP: NEGATIVE
LDLC SERPL CALC-MCNC: 130.2 MG/DL (ref 63–159)
LEUKOCYTE ESTERASE UR QL STRIP: NEGATIVE
LYMPHOCYTES # BLD AUTO: 1.2 K/UL (ref 1–4.8)
LYMPHOCYTES NFR BLD: 23.2 % (ref 18–48)
MAGNESIUM SERPL-MCNC: 2 MG/DL (ref 1.6–2.6)
MCH RBC QN AUTO: 31 PG (ref 27–31)
MCHC RBC AUTO-ENTMCNC: 32.5 G/DL (ref 32–36)
MCV RBC AUTO: 95 FL (ref 82–98)
MICROSCOPIC COMMENT: NORMAL
MONOCYTES # BLD AUTO: 0.6 K/UL (ref 0.3–1)
MONOCYTES NFR BLD: 11.4 % (ref 4–15)
NEUTROPHILS # BLD AUTO: 3.1 K/UL (ref 1.8–7.7)
NEUTROPHILS NFR BLD: 60.9 % (ref 38–73)
NITRITE UR QL STRIP: NEGATIVE
NONHDLC SERPL-MCNC: 155 MG/DL
NRBC BLD-RTO: 0 /100 WBC
PH UR STRIP: 6 [PH] (ref 5–8)
PHOSPHATE SERPL-MCNC: 3.3 MG/DL (ref 2.7–4.5)
PLATELET # BLD AUTO: 213 K/UL (ref 150–450)
PMV BLD AUTO: 11 FL (ref 9.2–12.9)
POTASSIUM SERPL-SCNC: 4 MMOL/L (ref 3.5–5.1)
PROT SERPL-MCNC: 7.2 G/DL (ref 6–8.4)
PROT UR QL STRIP: 65 MG/DL
PROT UR QL STRIP: ABNORMAL
PROT UR-MCNC: 65 MG/DL
PROT/CREAT UR: 0.54 MG/G{CREAT} (ref 0–0.2)
PTH-INTACT SERPL-MCNC: 153.4 PG/ML (ref 9–77)
RBC # BLD AUTO: 3.97 M/UL (ref 4–5.4)
RBC #/AREA URNS HPF: 0 /HPF (ref 0–4)
SODIUM SERPL-SCNC: 139 MMOL/L (ref 136–145)
SP GR UR STRIP: 1.02 (ref 1–1.03)
SQUAMOUS #/AREA URNS HPF: 0 /HPF
TRIGL SERPL-MCNC: 124 MG/DL (ref 30–150)
TSH SERPL DL<=0.005 MIU/L-ACNC: 1.4 UIU/ML (ref 0.4–4)
TSH SERPL DL<=0.005 MIU/L-ACNC: 1.4 UIU/ML (ref 0.4–4)
URINE PROTEIN/CREATININE RATIO: 0.54
URN SPEC COLLECT METH UR: ABNORMAL
UROBILINOGEN UR STRIP-ACNC: NEGATIVE EU/DL
WBC # BLD AUTO: 5.08 K/UL (ref 3.9–12.7)
WBC #/AREA URNS HPF: 0 /HPF (ref 0–5)

## 2024-05-10 PROCEDURE — 80053 COMPREHEN METABOLIC PANEL: CPT | Performed by: INTERNAL MEDICINE

## 2024-05-10 PROCEDURE — 82570 ASSAY OF URINE CREATININE: CPT | Performed by: INTERNAL MEDICINE

## 2024-05-10 PROCEDURE — 84443 ASSAY THYROID STIM HORMONE: CPT | Performed by: INTERNAL MEDICINE

## 2024-05-10 PROCEDURE — 83970 ASSAY OF PARATHORMONE: CPT | Performed by: INTERNAL MEDICINE

## 2024-05-10 PROCEDURE — 81000 URINALYSIS NONAUTO W/SCOPE: CPT | Performed by: INTERNAL MEDICINE

## 2024-05-10 PROCEDURE — 36415 COLL VENOUS BLD VENIPUNCTURE: CPT | Performed by: INTERNAL MEDICINE

## 2024-05-10 PROCEDURE — 80061 LIPID PANEL: CPT | Performed by: INTERNAL MEDICINE

## 2024-05-10 PROCEDURE — 83735 ASSAY OF MAGNESIUM: CPT | Performed by: INTERNAL MEDICINE

## 2024-05-10 PROCEDURE — 84100 ASSAY OF PHOSPHORUS: CPT | Performed by: INTERNAL MEDICINE

## 2024-05-10 PROCEDURE — 85025 COMPLETE CBC W/AUTO DIFF WBC: CPT | Performed by: INTERNAL MEDICINE

## 2024-05-10 PROCEDURE — 83036 HEMOGLOBIN GLYCOSYLATED A1C: CPT | Performed by: INTERNAL MEDICINE

## 2024-05-15 ENCOUNTER — OFFICE VISIT (OUTPATIENT)
Dept: FAMILY MEDICINE | Facility: CLINIC | Age: 69
End: 2024-05-15
Payer: MEDICARE

## 2024-05-15 VITALS
SYSTOLIC BLOOD PRESSURE: 116 MMHG | OXYGEN SATURATION: 99 % | WEIGHT: 177.5 LBS | HEIGHT: 62 IN | HEART RATE: 60 BPM | DIASTOLIC BLOOD PRESSURE: 74 MMHG | BODY MASS INDEX: 32.66 KG/M2 | TEMPERATURE: 98 F

## 2024-05-15 DIAGNOSIS — R49.0 CHRONIC HOARSENESS: ICD-10-CM

## 2024-05-15 DIAGNOSIS — Z00.00 ANNUAL PHYSICAL EXAM: Primary | ICD-10-CM

## 2024-05-15 DIAGNOSIS — Z71.2 ENCOUNTER TO DISCUSS TEST RESULTS: ICD-10-CM

## 2024-05-15 DIAGNOSIS — H34.8320 BRANCH RETINAL VEIN OCCLUSION WITH MACULAR EDEMA OF LEFT EYE: ICD-10-CM

## 2024-05-15 PROCEDURE — 1159F MED LIST DOCD IN RCRD: CPT | Mod: CPTII,S$GLB,, | Performed by: INTERNAL MEDICINE

## 2024-05-15 PROCEDURE — 3044F HG A1C LEVEL LT 7.0%: CPT | Mod: CPTII,S$GLB,, | Performed by: INTERNAL MEDICINE

## 2024-05-15 PROCEDURE — 3078F DIAST BP <80 MM HG: CPT | Mod: CPTII,S$GLB,, | Performed by: INTERNAL MEDICINE

## 2024-05-15 PROCEDURE — 1160F RVW MEDS BY RX/DR IN RCRD: CPT | Mod: CPTII,S$GLB,, | Performed by: INTERNAL MEDICINE

## 2024-05-15 PROCEDURE — 3288F FALL RISK ASSESSMENT DOCD: CPT | Mod: CPTII,S$GLB,, | Performed by: INTERNAL MEDICINE

## 2024-05-15 PROCEDURE — 3066F NEPHROPATHY DOC TX: CPT | Mod: CPTII,S$GLB,, | Performed by: INTERNAL MEDICINE

## 2024-05-15 PROCEDURE — 1101F PT FALLS ASSESS-DOCD LE1/YR: CPT | Mod: CPTII,S$GLB,, | Performed by: INTERNAL MEDICINE

## 2024-05-15 PROCEDURE — 1125F AMNT PAIN NOTED PAIN PRSNT: CPT | Mod: CPTII,S$GLB,, | Performed by: INTERNAL MEDICINE

## 2024-05-15 PROCEDURE — 99397 PER PM REEVAL EST PAT 65+ YR: CPT | Mod: S$GLB,,, | Performed by: INTERNAL MEDICINE

## 2024-05-15 PROCEDURE — 3008F BODY MASS INDEX DOCD: CPT | Mod: CPTII,S$GLB,, | Performed by: INTERNAL MEDICINE

## 2024-05-15 PROCEDURE — 3074F SYST BP LT 130 MM HG: CPT | Mod: CPTII,S$GLB,, | Performed by: INTERNAL MEDICINE

## 2024-05-15 PROCEDURE — 4010F ACE/ARB THERAPY RXD/TAKEN: CPT | Mod: CPTII,S$GLB,, | Performed by: INTERNAL MEDICINE

## 2024-05-15 PROCEDURE — 99999 PR PBB SHADOW E&M-EST. PATIENT-LVL V: CPT | Mod: PBBFAC,,, | Performed by: INTERNAL MEDICINE

## 2024-05-15 RX ORDER — MELATONIN 1 MG
TABLET,CHEWABLE ORAL
COMMUNITY

## 2024-05-15 NOTE — PROGRESS NOTES
SUBJECTIVE     Chief Complaint   Patient presents with    Annual Exam    Fatigue    Sinus Problem       HPI  Riley Marcus is a 69 y.o. female with multiple medical diagnoses as listed in the medical history and problem list that presents for annual exam. Pt has been doing well since her last visit. She has a good appetite and eats well. She does exercise in the gym 1-3 times weekly. She sleeps for ~7-9 hours nightly. Pt does take OTC supplements, which is Vit C/D/B/E, turmeric, apple cider vinegar, biotin, iron, glucosamine, green leaf tea, and Mg. She does not have any current stressors. Pt is UTD on age appropriate CA screening.    PAST MEDICAL HISTORY:  Past Medical History:   Diagnosis Date    Arthritis     Blood transfusion     Branch retinal vein occlusion of left eye     Cataract     Chronic midline low back pain with bilateral sciatica 09/08/2023    Corneal abrasion 20 yrs    ? eye due to cls     GERD (gastroesophageal reflux disease)     Hyperlipidemia     Hypertension     Lattice degeneration        PAST SURGICAL HISTORY:  Past Surgical History:   Procedure Laterality Date    Breast reduction      BREAST SURGERY      COLONOSCOPY N/A 07/20/2022    Procedure: COLONOSCOPY;  Surgeon: Chanda Hawkins MD;  Location: [facility];  Service: Endoscopy;  Laterality: N/A;    ESOPHAGOGASTRODUODENOSCOPY N/A 07/20/2022    Procedure: ESOPHAGOGASTRODUODENOSCOPY (EGD);  Surgeon: Chanda Hawkins MD;  Location: [facility];  Service: Endoscopy;  Laterality: N/A;    EYE SURGERY      HYSTERECTOMY      INJECTION Bilateral 01/18/2024    Procedure: INJECTION, BILATERAL PIRIFORMIS;  Surgeon: Raj Pierre MD;  Location: [facility];  Service: Pain Management;  Laterality: Bilateral;  [phone]  2 WK F/U ERIBERTO    INJECTION OF JOINT Bilateral 03/07/2024    Procedure: INJECTION, JOINT BILATERAL SI AND BILATERAL GTB;  Surgeon: Raj Pierre MD;  Location: [facility] PAIN T;  Service: Pain Management;  Laterality:  Bilateral;  [phone]  2 WK F/U ERIBERTO    LASER LAPAROSCOPY      LASIK Bilateral     MINI-LAPAROTOMY      OOPHORECTOMY      TOTAL REDUCTION MAMMOPLASTY         SOCIAL HISTORY:  Social History     Socioeconomic History    Marital status:     Number of children: 0   Occupational History    Occupation: RN     Employer: [employer]   Tobacco Use    Smoking status: Never     Passive exposure: Never    Smokeless tobacco: Never   Substance and Sexual Activity    Alcohol use: Not Currently     Comment: rare glass of wine  about 2 to 3 times/ year    Drug use: No    Sexual activity: Not Currently     Partners: Male     Birth control/protection: Post-menopausal, See Surgical Hx, None   Social History Narrative      several years ago    She has not been sexually-active since    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 [facility]     Social Determinants of Health     Financial Resource Strain: Low Risk  (3/11/2024)    Overall Financial Resource Strain (CARDIA)     Difficulty of Paying Living Expenses: Not hard at all   Food Insecurity: No Food Insecurity (3/11/2024)    Hunger Vital Sign     Worried About Running Out of Food in the Last Year: Never true     Ran Out of Food in the Last Year: Never true   Transportation Needs: No Transportation Needs (3/11/2024)    PRAPARE - Transportation     Lack of Transportation (Medical): No     Lack of Transportation (Non-Medical): No   Physical Activity: Sufficiently Active (3/11/2024)    Exercise Vital Sign     Days of Exercise per Week: 4 days     Minutes of Exercise per Session: 90 min   Stress: No Stress Concern Present (3/11/2024)    [employer] [place] [employer] - Occupational Stress Questionnaire     Feeling of Stress : Not at all   Housing Stability: Low Risk  (3/11/2024)    Housing Stability Vital Sign     Unable to Pay for Housing in the Last Year: No     Number of Places Lived in the Last Year: 1     Unstable  Housing in the Last Year: No       FAMILY HISTORY:  Family History   Problem Relation Name Age of Onset    Arthritis Mother Misa M. allen     Heart disease Mother Misa M. allen     Hypertension Mother Misa M. allen     Cataracts Mother Misa M. allen     Heart disease Father Sagar K. Allen     Hypertension Father Sagar K. Allen     Stroke Father Sagar K. Allen     Diabetes Sister Kenia S. Rivera     Hypertension Sister Kenia S. Rivera     Hypertension Sister Wendi Allen     Eczema Other niece     Arthritis Maternal Grandmother Luly Barr     Heart disease Maternal Grandmother Luly Barr     Hypertension Maternal Grandmother Luly Barr     Arthritis Maternal Grandfather Italo Barr     Arthritis Paternal Grandmother Hailey E. Allen     Heart disease Paternal Grandmother Hailey E. Allen     Stroke Paternal Grandmother Hailey E. Allen     Arthritis Paternal Grandfather Aashish Allen     No Known Problems Brother      No Known Problems Maternal Aunt      No Known Problems Maternal Uncle      No Known Problems Paternal Aunt      No Known Problems Paternal Uncle      Ovarian cancer Neg Hx      Breast cancer Neg Hx      Anxiety disorder Neg Hx      Depression Neg Hx      Suicide Neg Hx      Amblyopia Neg Hx      Blindness Neg Hx      Cancer Neg Hx      Glaucoma Neg Hx      Macular degeneration Neg Hx      Retinal detachment Neg Hx      Strabismus Neg Hx      Thyroid disease Neg Hx      Colon cancer Neg Hx      Esophageal cancer Neg Hx         ALLERGIES AND MEDICATIONS: updated and reviewed.  Review of patient's allergies indicates:   Allergen Reactions    Sulfa (sulfonamide antibiotics) Itching and Rash     Other reaction(s): Rash     Current Outpatient Medications   Medication Sig Dispense Refill    acetaminophen (TYLENOL) 500 MG tablet Take 500 mg by mouth every 6 (six) hours as needed for Pain.      amLODIPine (NORVASC) 10 MG tablet  Take 1 tablet (10 mg total) by mouth once daily. 90 tablet 3    apple cider vinegar 500 mg Tab       ascorbic acid, vitamin C, (VITAMIN C) 500 MG tablet Take 500 mg by mouth once daily.      aspirin 81 mg Tab Take by mouth. 1 Tablet Oral Every day      B-complex with vitamin C (Z-BEC OR EQUIV) tablet       biotin 1,000 mcg Chew       cetirizine (ZYRTEC) 10 MG tablet Take 10 mg by mouth once daily.      cholecalciferol, vitamin D3, (VITAMIN D3) 25 mcg (1,000 unit) capsule Take 1,000 Units by mouth once daily.      colchicine (COLCRYS) 0.6 mg tablet Take 2 tablets by mouth, then take 1 tablet by mouth 1 hour later; okay to repeat in 24 hours if warranted 6 tablet 0    docusate sodium (COLACE) 100 MG capsule Take 100 mg by mouth once daily.      ezetimibe (ZETIA) 10 mg tablet Take 1 tablet (10 mg total) by mouth once daily. 90 tablet 0    ferrous sulfate (IRON) 325 mg (65 mg iron) Tab tablet       GLUC MAURICE/CHONDRO MAURICE A/VIT C/MN (GLUCOSAMINE-CHONDROIT-VIT C-MN) [id number] mg Tab Take by mouth. 1 Tablet Oral Every day      green tea leaf extract 315 mg Cap Take by mouth. 1 Capsule Oral Every day      hydrALAZINE (APRESOLINE) 25 MG tablet Take 1 tablet (25 mg total) by mouth every 12 (twelve) hours. 180 tablet 1    LIDOcaine (LIDODERM) 5 % Place 1 patch onto the skin once daily. Remove & Discard patch within 12 hours or as directed by MD 30 patch 2    lisinopriL (PRINIVIL,ZESTRIL) 40 MG tablet Take 1 tablet (40 mg total) by mouth once daily. 90 tablet 3    magnesium 250 mg Tab       melatonin 1 mg Chew Take by mouth.      methocarbamoL (ROBAXIN) 500 MG Tab Take 1 tablet (500 mg total) by mouth 3 (three) times daily as needed. 90 tablet 2    metoprolol succinate (TOPROL-XL) 50 MG 24 hr tablet Take 1 tablet (50 mg total) by mouth once daily. 90 tablet 3    omega-3 fatty acids 1,000 mg Cap Take by mouth. 1 Capsule Oral Every day      pantoprazole (PROTONIX) 40 MG tablet Take 1 tablet (40 mg total) by mouth once daily. 90  "tablet 3    UNABLE TO FIND medication name: Tumeric 1000 mg daily      VITAMIN E,DL-ALPHA TOCOPHEROL, (VITAMIN E, BULK, MISC) by Misc.(Non-Drug; Combo Route) route.       No current facility-administered medications for this visit.       ROS  Review of Systems   Constitutional:  Positive for fatigue. Negative for chills and fever.   HENT:  Positive for voice change. Negative for hearing loss and sore throat.    Eyes:  Negative for visual disturbance.   Respiratory:  Negative for cough and shortness of breath.    Cardiovascular:  Negative for chest pain, palpitations and leg swelling.   Gastrointestinal:  Negative for abdominal pain, constipation, diarrhea, nausea and vomiting.   Genitourinary:  Negative for dysuria, frequency and urgency.   Musculoskeletal:  Positive for back pain. Negative for arthralgias, joint swelling and myalgias.   Skin:  Negative for rash and wound.   Neurological:  Negative for headaches.   Psychiatric/Behavioral:  Negative for agitation and confusion. The patient is not nervous/anxious.          OBJECTIVE     Physical Exam  Vitals:    05/15/24 0859   BP: 116/74   Pulse: 60   Temp: 97.7 °F (36.5 °C)    Body mass index is 32.46 kg/m².  Weight: 80.5 kg (177 lb 7.5 oz)   Height: 5' 2" (157.5 cm)     Physical Exam  Constitutional:       General: She is not in acute distress.     Appearance: She is well-developed.   HENT:      Head: Normocephalic and atraumatic.      Right Ear: Tympanic membrane, ear canal and external ear normal.      Left Ear: Tympanic membrane, ear canal and external ear normal.      Nose: Nose normal.   Eyes:      General: No scleral icterus.        Right eye: No discharge.         Left eye: No discharge.      Conjunctiva/sclera: Conjunctivae normal.   Neck:      Vascular: No JVD.      Trachea: No tracheal deviation.   Cardiovascular:      Rate and Rhythm: Normal rate and regular rhythm.      Heart sounds: No murmur heard.     No friction rub. No gallop.   Pulmonary:      " Effort: Pulmonary effort is normal. No respiratory distress.      Breath sounds: Normal breath sounds. No wheezing.   Abdominal:      General: Bowel sounds are normal. There is no distension.      Palpations: Abdomen is soft. There is no mass.      Tenderness: There is no abdominal tenderness. There is no guarding or rebound.   Musculoskeletal:         General: No tenderness or deformity. Normal range of motion.      Cervical back: Normal range of motion and neck supple.   Skin:     General: Skin is warm and dry.      Findings: No erythema or rash.   Neurological:      Mental Status: She is alert and oriented to person, place, and time.      Motor: No abnormal muscle tone.      Coordination: Coordination normal.   Psychiatric:         Behavior: Behavior normal.         Thought Content: Thought content normal.         Judgment: Judgment normal.           Health Maintenance         Date Due Completion Date    Annual UACr Never done ---    RSV Vaccine (Age 60+ and Pregnant patients) (1 - 1-dose 60+ series) Never done ---    COVID-19 Vaccine (6 - 2023-24 season) 02/19/2024 10/19/2023    Mammogram 11/30/2024 11/30/2023    Hemoglobin A1c (Prediabetes) 05/10/2025 5/10/2024    DEXA Scan 02/14/2027 2/14/2022    TETANUS VACCINE 11/30/2027 11/30/2017    Lipid Panel 05/10/2029 5/10/2024    Colorectal Cancer Screening 07/20/2032 7/20/2022              ASSESSMENT     69 y.o. female with     1. Annual physical exam    2. Encounter to discuss test results    3. Branch retinal vein occlusion with macular edema of left eye    4. Chronic hoarseness        PLAN:     1. Annual physical exam  - Counseled on age appropriate medical preventative services, including age appropriate cancer screenings, over all nutritional health, need for a consistent exercise regimen and an over all push towards maintaining a vigorous and active lifestyle.  Counseled on age appropriate vaccines and discussed upcoming health care needs based on age/gender.   Spent time with patient counseling on need for a good patient/doctor relationship moving forward.  Discussed use of common OTC medications and supplements.  Discussed common dietary aids and use of caffeine and the need for good sleep hygiene and stress management.    2. Encounter to discuss test results  - Discussed recent lab results  - All questions/concerns addressed  - Pt voiced understanding    3. Branch retinal vein occlusion with macular edema of left eye  - Stable; no acute issues    4. Chronic hoarseness  - Ambulatory referral/consult to ENT; Future        RTC in 6 months     Olga Duncan MD  05/15/2024 9:47 AM        No follow-ups on file.

## 2024-05-15 NOTE — Clinical Note
Pt has done protein/Cr ratio with urine in 5/2024. Can we get this to count for her annual urine screening please? Thanks!

## 2024-05-17 ENCOUNTER — PATIENT MESSAGE (OUTPATIENT)
Dept: OTOLARYNGOLOGY | Facility: CLINIC | Age: 69
End: 2024-05-17
Payer: MEDICARE

## 2024-05-23 ENCOUNTER — OFFICE VISIT (OUTPATIENT)
Dept: OTOLARYNGOLOGY | Facility: CLINIC | Age: 69
End: 2024-05-23
Payer: MEDICARE

## 2024-05-23 VITALS
WEIGHT: 176.13 LBS | BODY MASS INDEX: 32.22 KG/M2 | HEART RATE: 68 BPM | SYSTOLIC BLOOD PRESSURE: 146 MMHG | DIASTOLIC BLOOD PRESSURE: 85 MMHG

## 2024-05-23 DIAGNOSIS — J38.3 VOCAL CORD ATROPHY: ICD-10-CM

## 2024-05-23 DIAGNOSIS — R49.0 DYSPHONIA: Primary | ICD-10-CM

## 2024-05-23 DIAGNOSIS — R49.0 CHRONIC HOARSENESS: ICD-10-CM

## 2024-05-23 PROCEDURE — 31579 LARYNGOSCOPY TELESCOPIC: CPT | Mod: S$GLB,,, | Performed by: NURSE PRACTITIONER

## 2024-05-23 PROCEDURE — 1159F MED LIST DOCD IN RCRD: CPT | Mod: CPTII,S$GLB,, | Performed by: NURSE PRACTITIONER

## 2024-05-23 PROCEDURE — 3079F DIAST BP 80-89 MM HG: CPT | Mod: CPTII,S$GLB,, | Performed by: NURSE PRACTITIONER

## 2024-05-23 PROCEDURE — 4010F ACE/ARB THERAPY RXD/TAKEN: CPT | Mod: CPTII,S$GLB,, | Performed by: NURSE PRACTITIONER

## 2024-05-23 PROCEDURE — 99999 PR PBB SHADOW E&M-EST. PATIENT-LVL V: CPT | Mod: PBBFAC,,, | Performed by: NURSE PRACTITIONER

## 2024-05-23 PROCEDURE — 3044F HG A1C LEVEL LT 7.0%: CPT | Mod: CPTII,S$GLB,, | Performed by: NURSE PRACTITIONER

## 2024-05-23 PROCEDURE — 3066F NEPHROPATHY DOC TX: CPT | Mod: CPTII,S$GLB,, | Performed by: NURSE PRACTITIONER

## 2024-05-23 PROCEDURE — 3008F BODY MASS INDEX DOCD: CPT | Mod: CPTII,S$GLB,, | Performed by: NURSE PRACTITIONER

## 2024-05-23 PROCEDURE — 3077F SYST BP >= 140 MM HG: CPT | Mod: CPTII,S$GLB,, | Performed by: NURSE PRACTITIONER

## 2024-05-23 PROCEDURE — 1126F AMNT PAIN NOTED NONE PRSNT: CPT | Mod: CPTII,S$GLB,, | Performed by: NURSE PRACTITIONER

## 2024-05-23 PROCEDURE — 1160F RVW MEDS BY RX/DR IN RCRD: CPT | Mod: CPTII,S$GLB,, | Performed by: NURSE PRACTITIONER

## 2024-05-23 PROCEDURE — 99203 OFFICE O/P NEW LOW 30 MIN: CPT | Mod: 25,S$GLB,, | Performed by: NURSE PRACTITIONER

## 2024-05-23 RX ORDER — FLUTICASONE PROPIONATE 50 MCG
2 SPRAY, SUSPENSION (ML) NASAL DAILY
Qty: 16 G | Refills: 12 | Status: SHIPPED | OUTPATIENT
Start: 2024-05-23 | End: 2024-06-23

## 2024-05-23 NOTE — ASSESSMENT & PLAN NOTE
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 Discussed voice therapy to restore her voice. Follow up with Dr. Bahena if voice does not improve. Questions answered.

## 2024-05-23 NOTE — PROGRESS NOTES
Subjective     Patient ID: Riley Marcus is a 69 y.o. female.    Chief Complaint: chronic hoarseness    HPI    Riley Marcus is a 69 y.o. female who has been referred by Dr. Duncan for a 6 months history of hoarseness. Her voice sounds gravely. Her voice is progressively worsening over this time. There are not pitch breaks or cracks. She has difficulty projecting her voice. There is not vocal fatigue. She  denies dysphagia, odynophagia, and otalgia. She does have some throat pain off and on. There is no hemoptysis or hematemesis.  She is breathing well.     She admits to throat clearing and morning cough. She has post nasal drainage at night. She admits to heartburn and reflux, but well controlled with Protonix.    Past Medical History:   Diagnosis Date    Arthritis     Blood transfusion     Branch retinal vein occlusion of left eye     Cataract     Chronic midline low back pain with bilateral sciatica 09/08/2023    Corneal abrasion 20 yrs    ? eye due to cls     GERD (gastroesophageal reflux disease)     Hyperlipidemia     Hypertension     Lattice degeneration        Past Surgical History:   Procedure Laterality Date    Breast reduction      BREAST SURGERY      COLONOSCOPY N/A 07/20/2022    Procedure: COLONOSCOPY;  Surgeon: Chanda Hawkins MD;  Location: [facility];  Service: Endoscopy;  Laterality: N/A;    ESOPHAGOGASTRODUODENOSCOPY N/A 07/20/2022    Procedure: ESOPHAGOGASTRODUODENOSCOPY (EGD);  Surgeon: Chanda Hawkins MD;  Location: [facility];  Service: Endoscopy;  Laterality: N/A;    EYE SURGERY      HYSTERECTOMY      INJECTION Bilateral 01/18/2024    Procedure: INJECTION, BILATERAL PIRIFORMIS;  Surgeon: Raj Pierre MD;  Location: [facility];  Service: Pain Management;  Laterality: Bilateral;  [phone]  2 [facility] F/U [facility]    INJECTION OF JOINT Bilateral 03/07/2024    Procedure: INJECTION, JOINT BILATERAL SI AND BILATERAL GTB;  Surgeon: Raj Pierre MD;  Location: [facility]  PAIN [facility];  Service: Pain Management;  Laterality: Bilateral;  [phone]  2 [facility] F/U [facility]    LASER LAPAROSCOPY  1988    LASIK Bilateral     MINI-LAPAROTOMY  1988    OOPHORECTOMY      TOTAL REDUCTION MAMMOPLASTY           Current Outpatient Medications:     acetaminophen (TYLENOL) 500 MG tablet, Take 500 mg by mouth every 6 (six) hours as needed for Pain., Disp: , Rfl:     amLODIPine (NORVASC) 10 MG tablet, Take 1 tablet (10 mg total) by mouth once daily., Disp: 90 tablet, Rfl: 3    apple cider vinegar 500 mg Tab, , Disp: , Rfl:     ascorbic acid, vitamin C, (VITAMIN C) 500 MG tablet, Take 500 mg by mouth once daily., Disp: , Rfl:     aspirin 81 mg Tab, Take by mouth. 1 Tablet Oral Every day, Disp: , Rfl:     B-complex with vitamin C (Z-BEC OR EQUIV) tablet, , Disp: , Rfl:     biotin 1,000 mcg Chew, , Disp: , Rfl:     cetirizine (ZYRTEC) 10 MG tablet, Take 10 mg by mouth once daily., Disp: , Rfl:     cholecalciferol, vitamin D3, (VITAMIN D3) 25 mcg (1,000 unit) capsule, Take 1,000 Units by mouth once daily., Disp: , Rfl:     colchicine (COLCRYS) 0.6 mg tablet, Take 2 tablets by mouth, then take 1 tablet by mouth 1 hour later; okay to repeat in 24 hours if warranted, Disp: 6 tablet, Rfl: 0    docusate sodium (COLACE) 100 MG capsule, Take 100 mg by mouth once daily., Disp: , Rfl:     ezetimibe (ZETIA) 10 mg tablet, Take 1 tablet (10 mg total) by mouth once daily., Disp: 90 tablet, Rfl: 0    ferrous sulfate (IRON) 325 mg (65 mg iron) Tab tablet, , Disp: , Rfl:     fluticasone propionate (FLONASE) 50 mcg/actuation nasal spray, 2 sprays (100 mcg total) by Each Nostril route once daily., Disp: 16 g, Rfl: 12    GLUC MAURICE/CHONDRO MAURICE A/VIT C/MN (GLUCOSAMINE-CHONDROIT-VIT C-MN) 920-487-23-5 mg Tab, Take by mouth. 1 Tablet Oral Every day, Disp: , Rfl:     green tea leaf extract 315 mg Cap, Take by mouth. 1 Capsule Oral Every day, Disp: , Rfl:     hydrALAZINE (APRESOLINE) 25 MG tablet, Take 1 tablet (25 mg  total) by mouth every 12 (twelve) hours., Disp: 180 tablet, Rfl: 1    LIDOcaine (LIDODERM) 5 %, Place 1 patch onto the skin once daily. Remove & Discard patch within 12 hours or as directed by MD, Disp: 30 patch, Rfl: 2    lisinopriL (PRINIVIL,ZESTRIL) 40 MG tablet, Take 1 tablet (40 mg total) by mouth once daily., Disp: 90 tablet, Rfl: 3    magnesium 250 mg Tab, , Disp: , Rfl:     melatonin 1 mg Chew, Take by mouth., Disp: , Rfl:     methocarbamoL (ROBAXIN) 500 MG Tab, Take 1 tablet (500 mg total) by mouth 3 (three) times daily as needed., Disp: 90 tablet, Rfl: 2    metoprolol succinate (TOPROL-XL) 50 MG 24 hr tablet, Take 1 tablet (50 mg total) by mouth once daily., Disp: 90 tablet, Rfl: 3    omega-3 fatty acids 1,000 mg Cap, Take by mouth. 1 Capsule Oral Every day, Disp: , Rfl:     pantoprazole (PROTONIX) 40 MG tablet, Take 1 tablet (40 mg total) by mouth once daily., Disp: 90 tablet, Rfl: 3    UNABLE TO FIND, medication name: Tumeric 1000 mg daily, Disp: , Rfl:     VITAMIN E,DL-ALPHA TOCOPHEROL, (VITAMIN E, BULK, MISC), by Misc.(Non-Drug; Combo Route) route., Disp: , Rfl:     Review of patient's allergies indicates:   Allergen Reactions    Sulfa (sulfonamide antibiotics) Itching and Rash     Other reaction(s): Rash       Social History     Socioeconomic History    Marital status:     Number of children: 0   Occupational History    Occupation: RN     Employer: [employer]   Tobacco Use    Smoking status: Never     Passive exposure: Never    Smokeless tobacco: Never   Substance and Sexual Activity    Alcohol use: Not Currently     Comment: rare glass of wine  about 2 to 3 times/ year    Drug use: No    Sexual activity: Not Currently     Partners: Male     Birth control/protection: Post-menopausal, See Surgical Hx, None   Social History Narrative      several years ago    She has not been sexually-active since    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[facility] [place]     Social Determinants of Health     Financial Resource Strain: Low Risk  (3/11/2024)    Overall Financial Resource Strain (CARDIA)     Difficulty of Paying Living Expenses: Not hard at all   Food Insecurity: No Food Insecurity (3/11/2024)    Hunger Vital Sign     Worried About Running Out of Food in the Last Year: Never true     Ran Out of Food in the Last Year: Never true   Transportation Needs: No Transportation Needs (3/11/2024)    PRAPARE - Transportation     Lack of Transportation (Medical): No     Lack of Transportation (Non-Medical): No   Physical Activity: Sufficiently Active (3/11/2024)    Exercise Vital Sign     Days of Exercise per Week: 4 days     Minutes of Exercise per Session: 90 min   Stress: No Stress Concern Present (3/11/2024)    [employer] [place] [employer] - Occupational Stress Questionnaire     Feeling of Stress : Not at all   Housing Stability: Low Risk  (3/11/2024)    Housing Stability Vital Sign     Unable to Pay for Housing in the Last Year: No     Number of Places Lived in the Last Year: 1     Unstable Housing in the Last Year: No       Family History   Problem Relation Name Age of Onset    Arthritis Mother Misa M. mckinley     Heart disease Mother Misa M. mckinley     Hypertension Mother Misa M. mckinley     Cataracts Mother Misa M. mckinley     Heart disease Father Keith K. Mckinley     Hypertension Father Keith K. Mckinley     Stroke Father Keith K. Mckinley     Diabetes Sister Kenia S. Rivera     Hypertension Sister Kenia S. Rivera     Hypertension Sister Wendi Mckinley     Eczema Other niece     Arthritis Maternal Grandmother Luly Barr     Heart disease Maternal Grandmother Luly Barr     Hypertension Maternal Grandmother Luly Barr     Arthritis Maternal Grandfather Italo Barr     Arthritis Paternal Grandmother Hailey E. Mckinley     Heart disease Paternal Grandmother Hailey DAILEY.  Allen     Stroke Paternal Grandmother Hailey Allen     Arthritis Paternal Grandfather Aashish Allen     No Known Problems Brother      No Known Problems Maternal Aunt      No Known Problems Maternal Uncle      No Known Problems Paternal Aunt      No Known Problems Paternal Uncle      Ovarian cancer Neg Hx      Breast cancer Neg Hx      Anxiety disorder Neg Hx      Depression Neg Hx      Suicide Neg Hx      Amblyopia Neg Hx      Blindness Neg Hx      Cancer Neg Hx      Glaucoma Neg Hx      Macular degeneration Neg Hx      Retinal detachment Neg Hx      Strabismus Neg Hx      Thyroid disease Neg Hx      Colon cancer Neg Hx      Esophageal cancer Neg Hx             Review of Systems   Constitutional:  Negative for appetite change, chills, diaphoresis, fatigue, fever and unexpected weight change.   HENT:  Positive for postnasal drip, rhinorrhea, sinus pressure/congestion, sore throat and voice change. Negative for nasal congestion, dental problem, drooling, ear discharge, ear pain, facial swelling, hearing loss, mouth sores, nosebleeds, sneezing, tinnitus and trouble swallowing.    Eyes: Negative.  Negative for pain, discharge, redness and itching.   Respiratory:  Negative for cough and shortness of breath.    Cardiovascular: Negative.  Negative for chest pain.   Gastrointestinal:  Negative for abdominal distention, abdominal pain, diarrhea, nausea and vomiting.   Endocrine: Negative.  Negative for cold intolerance and heat intolerance.   Genitourinary: Negative.  Negative for difficulty urinating.   Musculoskeletal:  Negative for neck pain and neck stiffness.   Integumentary:  Negative for rash. Negative.   Neurological: Negative.  Negative for dizziness, weakness and headaches.   Hematological: Negative.  Negative for adenopathy.   Psychiatric/Behavioral: Negative.            Objective     Physical Exam  Vitals reviewed.   Constitutional:       General: She is not in acute  distress.     Appearance: She is well-developed. She is not ill-appearing or diaphoretic.   HENT:      Head: Normocephalic and atraumatic.      Jaw: No trismus.      Right Ear: Hearing and external ear normal.      Left Ear: Hearing and external ear normal.      Nose: Nose normal. No nasal deformity, mucosal edema or rhinorrhea.      Right Sinus: No maxillary sinus tenderness or frontal sinus tenderness.      Left Sinus: No maxillary sinus tenderness or frontal sinus tenderness.      Mouth/Throat:      Lips: Pink. No lesions.      Mouth: Mucous membranes are moist. Mucous membranes are not pale, not dry and not cyanotic. No oral lesions.      Dentition: Normal dentition. Does not have dentures. No dental caries.      Tongue: No lesions. Tongue does not deviate from midline.      Palate: No mass and lesions.      Pharynx: Uvula midline. No pharyngeal swelling, oropharyngeal exudate, posterior oropharyngeal erythema or uvula swelling.      Tonsils: No tonsillar abscesses.      Comments: Flexible Laryngeal Videostroboscopy (59639): Laryngeal videostroboscopy is indicated to assess laryngeal vibratory biomechanics and vocal fold oscillation, which cannot be assessed with a plain light examination. This was carried out transnasally with a distal chip videoendoscope. After verbal consent was obtained, the patient was positioned and the nose was topically decongested with 1% phenylephrine and topically anesthetized with 4% lidocaine. The endoscope was passed through the most patent nasal cavity and positioned to image the nasopharynx, larynx, and hypopharynx in detail. The following features were examined: nasopharyngeal, laryngeal, hypopharyngeal masses; velopharyngeal strength, closure, and symmetry of motion; vocal fold range and symmetry of motion; laryngeal mucosal edema, erythema, inflammation, and hydration; salivary pooling; and gross laryngeal sensation. During phonation, the vocal folds were assessed for glottal  closure; mucosal wave; vocal fold lesions; vibratory periodicity, amplitude, and phase symmetry; and vertical height match. The equipment was removed. The patient tolerated the procedure well without complication. All findings were normal except:  - there is supraglottic hyperfunction  -mild vocal cord atrophy with small glottic gap  -variable closure pattern      Eyes:      General: No scleral icterus.        Right eye: No discharge.         Left eye: No discharge.      Conjunctiva/sclera: Conjunctivae normal.   Neck:      Thyroid: No thyroid mass or thyromegaly.      Vascular: No JVD.      Trachea: Trachea and phonation normal. No tracheal tenderness or tracheal deviation.      Comments: Salivary glands - there are no lesions or asymmetric findings in the submandibular or parotid glands    Cardiovascular:      Rate and Rhythm: Normal rate.   Pulmonary:      Effort: Pulmonary effort is normal. No respiratory distress.      Breath sounds: No stridor.   Musculoskeletal:      Cervical back: Normal range of motion and neck supple.   Lymphadenopathy:      Head:      Right side of head: No submental, submandibular, tonsillar or preauricular adenopathy.      Left side of head: No submental, submandibular, tonsillar or preauricular adenopathy.      Cervical: No cervical adenopathy.   Skin:     General: Skin is warm and dry.      Coloration: Skin is not pale.      Findings: No erythema or rash.   Neurological:      Mental Status: She is alert and oriented to person, place, and time.      Cranial Nerves: No cranial nerve deficit.   Psychiatric:         Behavior: Behavior normal. Behavior is cooperative.         Thought Content: Thought content normal.        Assessment and Plan     1. Dysphonia    2. Chronic hoarseness  -     Ambulatory referral/consult to ENT  -     Ambulatory referral/consult to Speech Therapy; Future; Expected date: 05/30/2024    3. Vocal cord atrophy    Other orders  -     fluticasone propionate  (FLONASE) 50 mcg/actuation nasal spray; 2 sprays (100 mcg total) by Each Nostril route once daily.  Dispense: 16 g; Refill: 12          No follow-ups on file.

## 2024-05-28 ENCOUNTER — PATIENT OUTREACH (OUTPATIENT)
Dept: ADMINISTRATIVE | Facility: HOSPITAL | Age: 69
End: 2024-05-28
Payer: MEDICARE

## 2024-05-28 NOTE — PROGRESS NOTES
Health Maintenance Due   Topic Date Due    RSV Vaccine (Age 60+ and Pregnant patients) (1 - 1-dose 60+ series) Never done    COVID-19 Vaccine (6 - 2023-24 season) 02/19/2024     Chart review done. HM updated. Immunizations reviewed & updated. Care Everywhere updated.  MICROALBUMIN URINE UPDATED

## 2024-05-30 ENCOUNTER — TELEPHONE (OUTPATIENT)
Dept: SPEECH THERAPY | Facility: HOSPITAL | Age: 69
End: 2024-05-30
Payer: MEDICARE

## 2024-06-10 ENCOUNTER — LAB VISIT (OUTPATIENT)
Dept: LAB | Facility: HOSPITAL | Age: 69
End: 2024-06-10
Attending: INTERNAL MEDICINE
Payer: MEDICARE

## 2024-06-10 DIAGNOSIS — I10 PRIMARY HYPERTENSION: ICD-10-CM

## 2024-06-10 DIAGNOSIS — E55.9 VITAMIN D DEFICIENCY: ICD-10-CM

## 2024-06-10 DIAGNOSIS — E79.0 HYPERURICEMIA: ICD-10-CM

## 2024-06-10 DIAGNOSIS — R80.9 MICROALBUMINURIA: ICD-10-CM

## 2024-06-10 DIAGNOSIS — N25.81 SECONDARY HYPERPARATHYROIDISM OF RENAL ORIGIN: ICD-10-CM

## 2024-06-10 DIAGNOSIS — M10.9 GOUT, UNSPECIFIED CAUSE, UNSPECIFIED CHRONICITY, UNSPECIFIED SITE: ICD-10-CM

## 2024-06-10 DIAGNOSIS — N18.32 STAGE 3B CHRONIC KIDNEY DISEASE: ICD-10-CM

## 2024-06-10 LAB
ANION GAP SERPL CALC-SCNC: 10 MMOL/L (ref 8–16)
BUN SERPL-MCNC: 28 MG/DL (ref 8–23)
CALCIUM SERPL-MCNC: 10.1 MG/DL (ref 8.7–10.5)
CHLORIDE SERPL-SCNC: 106 MMOL/L (ref 95–110)
CO2 SERPL-SCNC: 27 MMOL/L (ref 23–29)
CREAT SERPL-MCNC: 2 MG/DL (ref 0.5–1.4)
EST. GFR  (NO RACE VARIABLE): 27 ML/MIN/1.73 M^2
GLUCOSE SERPL-MCNC: 109 MG/DL (ref 70–110)
POTASSIUM SERPL-SCNC: 4.3 MMOL/L (ref 3.5–5.1)
SODIUM SERPL-SCNC: 143 MMOL/L (ref 136–145)

## 2024-06-10 PROCEDURE — 36415 COLL VENOUS BLD VENIPUNCTURE: CPT | Performed by: INTERNAL MEDICINE

## 2024-06-10 PROCEDURE — 80048 BASIC METABOLIC PNL TOTAL CA: CPT | Performed by: INTERNAL MEDICINE

## 2024-08-07 ENCOUNTER — LAB VISIT (OUTPATIENT)
Dept: LAB | Facility: HOSPITAL | Age: 69
End: 2024-08-07
Payer: MEDICARE

## 2024-08-07 DIAGNOSIS — M10.9 GOUT, UNSPECIFIED CAUSE, UNSPECIFIED CHRONICITY, UNSPECIFIED SITE: ICD-10-CM

## 2024-08-07 DIAGNOSIS — R80.9 MICROALBUMINURIA: ICD-10-CM

## 2024-08-07 DIAGNOSIS — E55.9 VITAMIN D DEFICIENCY: ICD-10-CM

## 2024-08-07 DIAGNOSIS — N18.32 STAGE 3B CHRONIC KIDNEY DISEASE: ICD-10-CM

## 2024-08-07 DIAGNOSIS — N25.81 SECONDARY HYPERPARATHYROIDISM OF RENAL ORIGIN: ICD-10-CM

## 2024-08-07 DIAGNOSIS — I10 PRIMARY HYPERTENSION: ICD-10-CM

## 2024-08-07 DIAGNOSIS — E79.0 HYPERURICEMIA: ICD-10-CM

## 2024-08-07 LAB
25(OH)D3+25(OH)D2 SERPL-MCNC: 50 NG/ML (ref 30–96)
ALBUMIN SERPL BCP-MCNC: 3.5 G/DL (ref 3.5–5.2)
ALP SERPL-CCNC: 59 U/L (ref 55–135)
ALT SERPL W/O P-5'-P-CCNC: 16 U/L (ref 10–44)
ANION GAP SERPL CALC-SCNC: 11 MMOL/L (ref 8–16)
AST SERPL-CCNC: 20 U/L (ref 10–40)
BASOPHILS # BLD AUTO: 0.06 K/UL (ref 0–0.2)
BASOPHILS NFR BLD: 1.1 % (ref 0–1.9)
BILIRUB SERPL-MCNC: 0.5 MG/DL (ref 0.1–1)
BILIRUB UR QL STRIP: NEGATIVE
BUN SERPL-MCNC: 32 MG/DL (ref 8–23)
CALCIUM SERPL-MCNC: 10.2 MG/DL (ref 8.7–10.5)
CHLORIDE SERPL-SCNC: 104 MMOL/L (ref 95–110)
CLARITY UR: CLEAR
CO2 SERPL-SCNC: 25 MMOL/L (ref 23–29)
COLOR UR: YELLOW
CREAT SERPL-MCNC: 1.8 MG/DL (ref 0.5–1.4)
CREAT UR-MCNC: 102.5 MG/DL (ref 15–325)
DIFFERENTIAL METHOD BLD: ABNORMAL
EOSINOPHIL # BLD AUTO: 0.3 K/UL (ref 0–0.5)
EOSINOPHIL NFR BLD: 6.4 % (ref 0–8)
ERYTHROCYTE [DISTWIDTH] IN BLOOD BY AUTOMATED COUNT: 12.6 % (ref 11.5–14.5)
EST. GFR  (NO RACE VARIABLE): 30 ML/MIN/1.73 M^2
FERRITIN SERPL-MCNC: 72 NG/ML (ref 20–300)
GLUCOSE SERPL-MCNC: 94 MG/DL (ref 70–110)
GLUCOSE UR QL STRIP: NEGATIVE
HCT VFR BLD AUTO: 37.9 % (ref 37–48.5)
HGB BLD-MCNC: 12.1 G/DL (ref 12–16)
HGB UR QL STRIP: NEGATIVE
IMM GRANULOCYTES # BLD AUTO: 0.01 K/UL (ref 0–0.04)
IMM GRANULOCYTES NFR BLD AUTO: 0.2 % (ref 0–0.5)
IRON SERPL-MCNC: 87 UG/DL (ref 30–160)
KETONES UR QL STRIP: NEGATIVE
LEUKOCYTE ESTERASE UR QL STRIP: NEGATIVE
LYMPHOCYTES # BLD AUTO: 1.5 K/UL (ref 1–4.8)
LYMPHOCYTES NFR BLD: 28.2 % (ref 18–48)
MAGNESIUM SERPL-MCNC: 2.2 MG/DL (ref 1.6–2.6)
MCH RBC QN AUTO: 30.6 PG (ref 27–31)
MCHC RBC AUTO-ENTMCNC: 31.9 G/DL (ref 32–36)
MCV RBC AUTO: 96 FL (ref 82–98)
MICROSCOPIC COMMENT: NORMAL
MONOCYTES # BLD AUTO: 0.6 K/UL (ref 0.3–1)
MONOCYTES NFR BLD: 10.4 % (ref 4–15)
NEUTROPHILS # BLD AUTO: 2.8 K/UL (ref 1.8–7.7)
NEUTROPHILS NFR BLD: 53.7 % (ref 38–73)
NITRITE UR QL STRIP: NEGATIVE
NRBC BLD-RTO: 0 /100 WBC
PH UR STRIP: 6 [PH] (ref 5–8)
PHOSPHATE SERPL-MCNC: 3.9 MG/DL (ref 2.7–4.5)
PLATELET # BLD AUTO: 231 K/UL (ref 150–450)
PMV BLD AUTO: 11.3 FL (ref 9.2–12.9)
POTASSIUM SERPL-SCNC: 3.9 MMOL/L (ref 3.5–5.1)
PROT SERPL-MCNC: 7.3 G/DL (ref 6–8.4)
PROT UR QL STRIP: ABNORMAL
PROT UR-MCNC: 32 MG/DL
PROT/CREAT UR: 0.31 MG/G{CREAT} (ref 0–0.2)
PTH-INTACT SERPL-MCNC: 143.5 PG/ML (ref 9–77)
RBC # BLD AUTO: 3.95 M/UL (ref 4–5.4)
SATURATED IRON: 24 % (ref 20–50)
SODIUM SERPL-SCNC: 140 MMOL/L (ref 136–145)
SP GR UR STRIP: 1.01 (ref 1–1.03)
TOTAL IRON BINDING CAPACITY: 364 UG/DL (ref 250–450)
TRANSFERRIN SERPL-MCNC: 246 MG/DL (ref 200–375)
URATE SERPL-MCNC: 8.2 MG/DL (ref 2.4–5.7)
URN SPEC COLLECT METH UR: ABNORMAL
UROBILINOGEN UR STRIP-ACNC: NEGATIVE EU/DL
WBC # BLD AUTO: 5.29 K/UL (ref 3.9–12.7)

## 2024-08-07 PROCEDURE — 84156 ASSAY OF PROTEIN URINE: CPT

## 2024-08-07 PROCEDURE — 81000 URINALYSIS NONAUTO W/SCOPE: CPT

## 2024-08-07 PROCEDURE — 36415 COLL VENOUS BLD VENIPUNCTURE: CPT

## 2024-08-07 PROCEDURE — 84550 ASSAY OF BLOOD/URIC ACID: CPT

## 2024-08-07 PROCEDURE — 83735 ASSAY OF MAGNESIUM: CPT

## 2024-08-07 PROCEDURE — 85025 COMPLETE CBC W/AUTO DIFF WBC: CPT

## 2024-08-07 PROCEDURE — 83970 ASSAY OF PARATHORMONE: CPT

## 2024-08-07 PROCEDURE — 83540 ASSAY OF IRON: CPT

## 2024-08-07 PROCEDURE — 82728 ASSAY OF FERRITIN: CPT

## 2024-08-07 PROCEDURE — 80053 COMPREHEN METABOLIC PANEL: CPT

## 2024-08-07 PROCEDURE — 82306 VITAMIN D 25 HYDROXY: CPT

## 2024-08-07 PROCEDURE — 84100 ASSAY OF PHOSPHORUS: CPT

## 2024-09-06 DIAGNOSIS — M53.3 SACROILIAC JOINT PAIN: ICD-10-CM

## 2024-09-06 DIAGNOSIS — M70.60 GREATER TROCHANTERIC BURSITIS, UNSPECIFIED LATERALITY: ICD-10-CM

## 2024-09-06 RX ORDER — LIDOCAINE 50 MG/G
1 PATCH TOPICAL DAILY
Qty: 30 PATCH | Refills: 2 | Status: SHIPPED | OUTPATIENT
Start: 2024-09-06

## 2024-09-23 ENCOUNTER — TELEPHONE (OUTPATIENT)
Dept: PAIN MEDICINE | Facility: CLINIC | Age: 69
End: 2024-09-23
Payer: MEDICARE

## 2024-09-23 ENCOUNTER — OFFICE VISIT (OUTPATIENT)
Dept: PAIN MEDICINE | Facility: CLINIC | Age: 69
End: 2024-09-23
Payer: MEDICARE

## 2024-09-23 VITALS
SYSTOLIC BLOOD PRESSURE: 150 MMHG | HEART RATE: 65 BPM | BODY MASS INDEX: 33.06 KG/M2 | WEIGHT: 180.75 LBS | DIASTOLIC BLOOD PRESSURE: 85 MMHG | RESPIRATION RATE: 18 BRPM | OXYGEN SATURATION: 100 %

## 2024-09-23 DIAGNOSIS — M79.18 MYOFASCIAL PAIN SYNDROME: ICD-10-CM

## 2024-09-23 DIAGNOSIS — M70.60 GREATER TROCHANTERIC BURSITIS, UNSPECIFIED LATERALITY: Primary | ICD-10-CM

## 2024-09-23 DIAGNOSIS — M70.70 ISCHIAL BURSITIS, UNSPECIFIED LATERALITY: ICD-10-CM

## 2024-09-23 PROCEDURE — 3077F SYST BP >= 140 MM HG: CPT | Mod: CPTII,S$GLB,, | Performed by: STUDENT IN AN ORGANIZED HEALTH CARE EDUCATION/TRAINING PROGRAM

## 2024-09-23 PROCEDURE — 3008F BODY MASS INDEX DOCD: CPT | Mod: CPTII,S$GLB,, | Performed by: STUDENT IN AN ORGANIZED HEALTH CARE EDUCATION/TRAINING PROGRAM

## 2024-09-23 PROCEDURE — 1125F AMNT PAIN NOTED PAIN PRSNT: CPT | Mod: CPTII,S$GLB,, | Performed by: STUDENT IN AN ORGANIZED HEALTH CARE EDUCATION/TRAINING PROGRAM

## 2024-09-23 PROCEDURE — 3079F DIAST BP 80-89 MM HG: CPT | Mod: CPTII,S$GLB,, | Performed by: STUDENT IN AN ORGANIZED HEALTH CARE EDUCATION/TRAINING PROGRAM

## 2024-09-23 PROCEDURE — 4010F ACE/ARB THERAPY RXD/TAKEN: CPT | Mod: CPTII,S$GLB,, | Performed by: STUDENT IN AN ORGANIZED HEALTH CARE EDUCATION/TRAINING PROGRAM

## 2024-09-23 PROCEDURE — 3044F HG A1C LEVEL LT 7.0%: CPT | Mod: CPTII,S$GLB,, | Performed by: STUDENT IN AN ORGANIZED HEALTH CARE EDUCATION/TRAINING PROGRAM

## 2024-09-23 PROCEDURE — 1159F MED LIST DOCD IN RCRD: CPT | Mod: CPTII,S$GLB,, | Performed by: STUDENT IN AN ORGANIZED HEALTH CARE EDUCATION/TRAINING PROGRAM

## 2024-09-23 PROCEDURE — 1160F RVW MEDS BY RX/DR IN RCRD: CPT | Mod: CPTII,S$GLB,, | Performed by: STUDENT IN AN ORGANIZED HEALTH CARE EDUCATION/TRAINING PROGRAM

## 2024-09-23 PROCEDURE — 3066F NEPHROPATHY DOC TX: CPT | Mod: CPTII,S$GLB,, | Performed by: STUDENT IN AN ORGANIZED HEALTH CARE EDUCATION/TRAINING PROGRAM

## 2024-09-23 PROCEDURE — 99214 OFFICE O/P EST MOD 30 MIN: CPT | Mod: S$GLB,,, | Performed by: STUDENT IN AN ORGANIZED HEALTH CARE EDUCATION/TRAINING PROGRAM

## 2024-09-23 PROCEDURE — 99999 PR PBB SHADOW E&M-EST. PATIENT-LVL III: CPT | Mod: PBBFAC,,, | Performed by: STUDENT IN AN ORGANIZED HEALTH CARE EDUCATION/TRAINING PROGRAM

## 2024-09-23 NOTE — PROGRESS NOTES
Chronic patient Established Note (Follow up visit)      SUBJECTIVE:    INTERVAL HISTORY 9/23/2024:  Riley Marcus is a 69 y.o. who presents to the clinic for follow up evaluation and management of low back and BL hip pain in the setting of SIJ dysfunction, lumbar radiculopathy, greater trochanteric pain syndrome, and myofascial pain syndrome.. Last seen in clinic on 3/21/2024 at which time we discussed formal PT referral for persistent pain complaints. Today, pt persents with primary complaint of BL buttock pain and lateral hip pain. Symptoms localized over ischium bilaterally and greater troch bilaterally. Pain is worse with prolonged walking and sitting  . Current pain 7-8/10.  Ibuprofen, naproxen, somewhat helpful--pt advised to discontinue NSAIDs d/t CKD.. Methocarbamol without much benefit. Tylenol 500 1-2 times daily. Also using topical lidocaine and diclofenac with benefit.    INTERVAL HISTORY 3/21/2024:  Riley Marcus presents for follow-up s/p Bilateral SIJ and Bilateral GTB on 3/7/2024.  She states this has provided 70% relief to her SIJ and 0% to the GTB. She continues robaxin 500 mg TID PRN and Lidocaine patches as needed with  moderate relief.  She denies any perceived side effects.   The patient denies fever/night sweats, urinary incontinence, bowel incontinence, significant weight changes, significant motor weakness or changes, or loss of sensations.  Her pain today is 7/10    INTERVAL HISTORY 2/26/2024:  Riley Marcus presents to the clinic for a follow-up appointment for chronic pain. Current pain intensity is 8/10.  Since the last visit, Riley Marcus states:  after her piriformis injection, she had 100% pain relief which lasted 3 weeks.  She however states that the pain has returned, and is worse in the morning.  It tends to feel better as the day progresses.  She continues to go to the gym 3 times a week and actively participates in a home exercise program to help her pain.       PRIOR HISTORY:   Riley Marcus presents to the clinic for the evaluation of lower back and bilateral buttocks pain. The pain started many years ago following no inciting event and symptoms have been worsening.The pain is located in the lower back area and radiates to the bilateral buttocks.  The pain is described as sharp and is rated as 8/10. The pain is rated with a score of  5/10 on the BEST day and a score of 8/10 on the WORST day.  Symptoms interfere with daily activity. The pain is exacerbated by walking, kneeling (putting close together), standing.  The pain is mitigated by moving legs far apart. The patient reports 7-8 hours of uninterrupted sleep per night.    Patient denies urinary incontinence, bowel incontinence, and significant motor weakness. She had a caudal MELL in 2015 which she didn't find beneficial    Physical Therapy/Home Exercise: yes, completed in October 2023. Continuing to do home exercise program.      Pain Disability Index Review:      9/23/2024    10:19 AM 3/21/2024     1:27 PM 12/11/2023     8:15 AM   Last 3 PDI Scores   Pain Disability Index (PDI) 49 [phone] 56         Pain Medications:   - tylenol extended release   - ibuprofen     report:  Reviewed and consistent with medication use as prescribed.  03/11/2022 03/11/2022 1 Oxycodone-Acetaminophn 7.5-325 16.00 4 [facility]       Pain Procedures:   11/23/2015: Caudal MELL ([place])  1/18/2024: Bilateral piriformis 100% for 3 weeks (still resolution of sciatica pain)  3/7/2024 - Bilateral SIJ and Bilateral GTB - 70% in the SIJ 0% in GTB relief    Imaging:   MRI LUMBAR SPINE WITHOUT CONTRAST     CLINICAL HISTORY:  Low back pain, symptoms persist with > 6wks conservative treatment; Lumbago with sciatica, right side     TECHNIQUE:  Multiplanar, multisequence MR images were acquired from the thoracolumbar junction to the sacrum without the administration of contrast.     COMPARISON:  10/21/2019     FINDINGS:  Alignment: Grade 1 anterolisthesis  of L3-4.     Vertebrae: 5 lumbar-type vertebral bodies. No aggressive marrow replacement process or fracture.Nobone marrow edema .     Discs: Degenerative changes L2-L5 disc space levels with disc space narrowing L4-5.     Cord: Normal. Conus terminates at L1.     Degenerative findings:     T12-L1: There is no focal disc herniation. No significant central canal narrowing . No significant neural foraminal narrowing.     L1-L2: There is no focal disc herniation. No significant central canal narrowing . No significant neural foraminal narrowing.     L2-L3: There is no focal disc herniation.Moderate hypertrophic changes of facets/ligamentum flavum thickening. Mild central canal narrowing . No significant neural foraminal narrowing.     L3-L4: There is no focal disc herniation.Broad-based disc bulge, facet degenerative change and ligamentum flavum thickening which contribute to  severe central canal narrowing .  Moderate right and mild left neural foraminal narrowing.     L4-L5: There is no focal disc herniation.Broad-based disc bulge, facet degenerative change and ligamentum flavum thickening which contribute to  severe central canal narrowing . Moderate right and mild left neural foraminal narrowing.     L5-S1: There is no focal disc herniation.Mild hypertrophic changes of facets/ligamentum flavum thickening. Mild central canal narrowing . No significant neural foraminal narrowing.     Paraspinal muscles & soft tissues: Unremarkable.     Impression:     Lumbar spondylosis L2-L5 worst at L3-4 and L4-5.        Electronically signed by: De Ross MD  Date:                                            11/30/2023  Time:                                           09:36    Allergies:   Review of patient's allergies indicates:   Allergen Reactions    Sulfa (sulfonamide antibiotics) Itching and Rash     Other reaction(s): Rash       Current Medications:   Current Outpatient Medications   Medication Sig Dispense Refill     acetaminophen (TYLENOL) 500 MG tablet Take 500 mg by mouth every 6 (six) hours as needed for Pain.      amLODIPine (NORVASC) 10 MG tablet Take 1 tablet (10 mg total) by mouth once daily. 90 tablet 3    apple cider vinegar 500 mg Tab       ascorbic acid, vitamin C, (VITAMIN C) 500 MG tablet Take 500 mg by mouth once daily.      aspirin 81 mg Tab Take by mouth. 1 Tablet Oral Every day      B-complex with vitamin C (Z-BEC OR EQUIV) tablet       biotin 1,000 mcg Chew       cetirizine (ZYRTEC) 10 MG tablet Take 10 mg by mouth once daily.      cholecalciferol, vitamin D3, (VITAMIN D3) 25 mcg (1,000 unit) capsule Take 1,000 Units by mouth once daily.      colchicine (COLCRYS) 0.6 mg tablet Take 2 tablets by mouth, then take 1 tablet by mouth 1 hour later; okay to repeat in 24 hours if warranted 6 tablet 0    docusate sodium (COLACE) 100 MG capsule Take 100 mg by mouth once daily.      ezetimibe (ZETIA) 10 mg tablet Take 1 tablet (10 mg total) by mouth once daily. 90 tablet 3    ferrous sulfate (IRON) 325 mg (65 mg iron) Tab tablet       fluticasone propionate (FLONASE) 50 mcg/actuation nasal spray 2 sprays (100 mcg total) by Each Nostril route once daily. 16 g 12    GLUC MAURICE/CHONDRO MAURICE A/VIT C/MN (GLUCOSAMINE-CHONDROIT-VIT C-MN) 505-042-91-5 mg Tab Take by mouth. 1 Tablet Oral Every day      green tea leaf extract 315 mg Cap Take by mouth. 1 Capsule Oral Every day      hydrALAZINE (APRESOLINE) 25 MG tablet Take 1 tablet (25 mg total) by mouth every 12 (twelve) hours. 180 tablet 1    LIDOcaine (LIDODERM) 5 % Place 1 patch onto the skin once daily. Remove & Discard patch within 12 hours or as directed by MD 30 patch 2    lisinopriL (PRINIVIL,ZESTRIL) 40 MG tablet Take 1 tablet (40 mg total) by mouth once daily. 90 tablet 3    magnesium 250 mg Tab       melatonin 1 mg Chew Take by mouth.      methocarbamoL (ROBAXIN) 500 MG Tab Take 1 tablet (500 mg total) by mouth 3 (three) times daily as needed. 90 tablet 2    metoprolol  succinate (TOPROL-XL) 50 MG 24 hr tablet Take 1 tablet (50 mg total) by mouth once daily. 90 tablet 3    omega-3 fatty acids 1,000 mg Cap Take by mouth. 1 Capsule Oral Every day      pantoprazole (PROTONIX) 40 MG tablet Take 1 tablet (40 mg total) by mouth once daily. 90 tablet 3    rutin/hesp/bioflav/C/[id number] (BIOFLEX ORAL) Take by mouth.      UNABLE TO FIND medication name: Tumeric 1000 mg daily      VITAMIN E,DL-ALPHA TOCOPHEROL, (VITAMIN E, BULK, MISC) by Misc.(Non-Drug; Combo Route) route.       No current facility-administered medications for this visit.       REVIEW OF SYSTEMS:    GENERAL:  No weight loss, malaise or fevers.  HEENT:  Negative for frequent or significant headaches.  NECK:  Negative for lumps, goiter, pain and significant neck swelling.  RESPIRATORY:  Negative for cough, wheezing or shortness of breath.  CARDIOVASCULAR:  Negative for chest pain, leg swelling or palpitations.  GI:  Negative for abdominal discomfort, blood in stools or black stools or change in bowel habits.  MUSCULOSKELETAL:  See HPI.  SKIN:  Negative for lesions, rash, and itching.  PSYCH:  Negative for sleep disturbance, mood disorder and recent psychosocial stressors.  HEMATOLOGY/LYMPHOLOGY:  Negative for prolonged bleeding, bruising easily or swollen nodes. +ASA 81mg  NEURO:   No history of headaches, syncope, paralysis, seizures or tremors.  All other reviewed and negative other than HPI.    Past Medical History:  Past Medical History:   Diagnosis Date    Arthritis     Blood transfusion     Branch retinal vein occlusion of left eye     Cataract     Chronic midline low back pain with bilateral sciatica 09/08/2023    Corneal abrasion 20 yrs    ? eye due to cls     GERD (gastroesophageal reflux disease)     Hyperlipidemia     Hypertension     Lattice degeneration        Past Surgical History:  Past Surgical History:   Procedure Laterality Date    Breast reduction      BREAST SURGERY      COLONOSCOPY N/A 07/20/2022     Procedure: COLONOSCOPY;  Surgeon: Chanda Hawkins MD;  Location: [facility] ENDO;  Service: Endoscopy;  Laterality: N/A;    ESOPHAGOGASTRODUODENOSCOPY N/A 07/20/2022    Procedure: ESOPHAGOGASTRODUODENOSCOPY (EGD);  Surgeon: Chanda Hawkins MD;  Location: [facility] ENDO;  Service: Endoscopy;  Laterality: N/A;    EYE SURGERY      HYSTERECTOMY      INJECTION Bilateral 01/18/2024    Procedure: INJECTION, BILATERAL PIRIFORMIS;  Surgeon: Raj Pierre MD;  [facility];  Service: Pain Management;  Laterality: Bilateral;  [phone]  2 WK F/U ERIBERTO    INJECTION OF JOINT Bilateral 03/07/2024    Procedure: INJECTION, JOINT BILATERAL SI AND BILATERAL GTB;  Surgeon: Raj Pierre MD;  Location: [facility]IN T;  Service: Pain Management;  Laterality: Bilateral;  [phone]  2 WK F/U ERIBERTO    LASER LAPAROSCOPY  1988    LASIK Bilateral     MINI-LAPAROTOMY  1988    OOPHORECTOMY      TOTAL REDUCTION MAMMOPLASTY         Family History:  Family History   Problem Relation Name Age of Onset    Arthritis Mother Misa M. mckinley     Heart disease Mother Misa M. mckinley     Hypertension Mother Misa M. mckinley     Cataracts Mother Misa M. mckinley     Heart disease Father Keith K. Mckinley     Hypertension Father Keith K. Mckinley     Stroke Father Keith K. Mckinley     Diabetes Sister Kenia NAZARIO. Rivera     Hypertension Sister Kenia NAZARIO. Rivera     Hypertension Sister Wendi Mckinley     Eczema Other niece     Arthritis Maternal Grandmother Luly Barr     Heart disease Maternal Grandmother Luly Barr     Hypertension Maternal Grandmother Luly Barr     Arthritis Maternal Grandfather Italoanya Barr     Arthritis Paternal Grandmother Hailey E. Mckinley     Heart disease Paternal Grandmother Hailey E. Mckinley     Stroke Paternal Grandmother Hailey E. Mckinley     Arthritis Paternal Grandfather Aashish Mckinley     No Known Problems Brother      No Known Problems Maternal Aunt      No  Known Problems Maternal Uncle      No Known Problems Paternal Aunt      No Known Problems Paternal Uncle      Ovarian cancer Neg Hx      Breast cancer Neg Hx      Anxiety disorder Neg Hx      Depression Neg Hx      Suicide Neg Hx      Amblyopia Neg Hx      Blindness Neg Hx      Cancer Neg Hx      Glaucoma Neg Hx      Macular degeneration Neg Hx      Retinal detachment Neg Hx      Strabismus Neg Hx      Thyroid disease Neg Hx      Colon cancer Neg Hx      Esophageal cancer Neg Hx         Social History:  Social History     Socioeconomic History    Marital status:     Number of children: 0   Occupational History    Occupation: RN     Employer: [employer]   Tobacco Use    Smoking status: Never     Passive exposure: Never    Smokeless tobacco: Never   Substance and Sexual Activity    Alcohol use: Not Currently     Comment: rare glass of wine  about 2 to 3 times/ year    Drug use: No    Sexual activity: Not Currently     Partners: Male     Birth control/protection: Post-menopausal, See Surgical Hx, None   Social History Narrative      several years ago    She has not been sexually-active since    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 [facility]     Social Determinants of Health     Financial Resource Strain: Low Risk  (3/11/2024)    Overall Financial Resource Strain (CARDIA)     Difficulty of Paying Living Expenses: Not hard at all   Food Insecurity: No Food Insecurity (3/11/2024)    Hunger Vital Sign     Worried About Running Out of Food in the Last Year: Never true     Ran Out of Food in the Last Year: Never true   Transportation Needs: No Transportation Needs (3/11/2024)    PRAPARE - Transportation     Lack of Transportation (Medical): No     Lack of Transportation (Non-Medical): No   Physical Activity: Sufficiently Active (3/11/2024)    Exercise Vital Sign     Days of Exercise per Week: 4 days     Minutes of Exercise per Session: 90 min   Stress: No Stress Concern  Present (3/11/2024)    [facility]ational [employer] - Occupational Stress Questionnaire     Feeling of Stress : Not at all   Housing Stability: Low Risk  (3/11/2024)    Housing Stability Vital Sign     Unable to Pay for Housing in the Last Year: No     Number of Places Lived in the Last Year: 1     Unstable Housing in the Last Year: No       OBJECTIVE:    BP (!) 150/85   Pulse 65   Resp 18   Wt 82 kg (180 lb 12.4 oz)   SpO2 100%   BMI 33.06 kg/m²     PHYSICAL EXAMINATION:  General appearance: Well appearing, in no acute distress, alert and appropriately communicative.  Psych:  Mood and affect appropriate.  Skin: Skin color, texture, turgor normal, no rashes or lesions, in both upper and lower body.  Head/face:  Atraumatic, normocephalic.  Cor: regular rate  Pulm: non-labored breathing  GI: Abdomen non-distended and non-tender.  Back: Straight leg raising in the sitting and supine positions is negative to radicular pain. No pain to palpation over the spine or paraspinal muscles. Normal range of motion without pain reproduction.  Extremities: Peripheral joint ROM is full and pain free without obvious instability or laxity in all four extremities. No deformities, edema, or skin discoloration. Good capillary refill.  Musculoskeletal: No gross deformities on inspection. Significant TTP over the bilateral ischial bursa and bilateral trochanteric bursa. .  Bilateral upper and lower extremity strength is normal and symmetric.  No atrophy or tone abnormalities are noted.  Neuro: Bilateral upper and lower extremity coordination and muscle stretch reflexes are physiologic and symmetric.  Negative Clonus. No loss of sensation is noted.  Gait: antalgic.    CMP  Sodium   Date Value Ref Range Status   08/07/2024 140 136 - 145 mmol/L Final     Potassium   Date Value Ref Range Status   08/07/2024 3.9 3.5 - 5.1 mmol/L Final     Chloride   Date Value Ref Range Status   08/07/2024 104 95 - 110 mmol/L Final     CO2    Date Value Ref Range Status   08/07/2024 25 23 - 29 mmol/L Final     Glucose   Date Value Ref Range Status   08/07/2024 94 70 - 110 mg/dL Final     BUN   Date Value Ref Range Status   08/07/2024 32 (H) 8 - 23 mg/dL Final     Creatinine   Date Value Ref Range Status   08/07/2024 1.8 (H) 0.5 - 1.4 mg/dL Final     Calcium   Date Value Ref Range Status   08/07/2024 10.2 8.7 - 10.5 mg/dL Final     Total Protein   Date Value Ref Range Status   08/07/2024 7.3 6.0 - 8.4 g/dL Final     Albumin   Date Value Ref Range Status   08/07/2024 3.5 3.5 - 5.2 g/dL Final     Total Bilirubin   Date Value Ref Range Status   08/07/2024 0.5 0.1 - 1.0 mg/dL Final     Comment:     For infants and newborns, interpretation of results should be based  on gestational age, weight and in agreement with clinical  observations.    Premature Infant recommended reference ranges:  Up to 24 hours.............<8.0 mg/dL  Up to 48 hours............<12.0 mg/dL  3-5 days..................<15.0 mg/dL  6-29 days.................<15.0 mg/dL       Alkaline Phosphatase   Date Value Ref Range Status   08/07/2024 59 55 - 135 U/L Final     AST   Date Value Ref Range Status   08/07/2024 20 10 - 40 U/L Final     ALT   Date Value Ref Range Status   08/07/2024 16 10 - 44 U/L Final     Anion Gap   Date Value Ref Range Status   08/07/2024 11 8 - 16 mmol/L Final     eGFR   Date Value Ref Range Status   08/07/2024 30 (A) >60 mL/min/1.73 m^2 Final   11/21/2023 31 (L) > OR = 60 mL/min/1.73m2 Final         ASSESSMENT: 69 y.o. year old female with buttock/hip pain, consistent with:    1. Greater trochanteric bursitis, unspecified laterality  Procedure Order to Pain Management      2. Ischial bursitis, unspecified laterality        3. Myofascial pain syndrome            IMPRESSION: Riley Marcus presents today for bilateral hip and bilateral buttock.  Symptoms are primarily related to greater trochanteric pain syndrome and ischial bursitis bilaterally. Recent XR imaging  reviewed with evidence of diffuse pelvic enthesopathy which is likely contributing to symptoms.  At this point, she has not had significant benefit from her bilateral GTB steroid injections, however her primary complaints seems to be associated with greater trochanteric bursitis. We will consider an additional diagnostic option to manage her GTB pain.    PLAN:   - I have stressed the importance of physical activity and a home exercise plan to help with pain and improve health.  - Patient can continue with medications for now since they are providing benefits, using them appropriately, and without side effects. The following recommendations to medication changes:    - Discontinue NSAIDs d/t CKD.    - Increase dosing of Tylenol to 1000mg TID.    - Continue topical medications.    - Continue Robaxin.   - Schedule patient for Bilateral GTB Blocks (without steroid) for diagnostic purposes. If successful in relieving patient's pain, can proceed with cooled RF of bilateral greater trochanteric bursae.   - we can consider ischial bursa injection once she has considered workup/management for her GTB pain.  - Continue physician prescribed HEP/HSP.   - Counseled patient regarding the importance of activity modification and physical therapy.  - Will call pt to assess response to blocks.     The above plan and management options were discussed at length with patient. Patient is in agreement with the above and verbalized understanding.    I performed a history, review of systems, and physical exam with the patient. The assessment and plan were discussed and agreed upon with Dr. Pierre, the attending of record, before sharing with the patient. The face to face encounter time, including answering all patient questions, was 20 minutes.     Eddie Michaels M.D.  PGY-5  Pain Fellow  09/23/2024     I have personally reviewed the history and exam of this patient and agree with the student/resident/fellow/NPs note as stated above.  I have  seen the patient and personally examined them as appropriate.  I have personally discussed the plan of care with the provider and patient and have reviewed and/or edited the plan of care as appropriate.  All questions have been answered to the best of my ability.    Raj Pierre MD PhD

## 2024-09-23 NOTE — H&P (VIEW-ONLY)
Chronic patient Established Note (Follow up visit)      SUBJECTIVE:    INTERVAL HISTORY 9/23/2024:  Riley Marcus is a 69 y.o. who presents to the clinic for follow up evaluation and management of low back and BL hip pain in the setting of SIJ dysfunction, lumbar radiculopathy, greater trochanteric pain syndrome, and myofascial pain syndrome.. Last seen in clinic on 3/21/2024 at which time we discussed formal PT referral for persistent pain complaints. Today, pt persents with primary complaint of BL buttock pain and lateral hip pain. Symptoms localized over ischium bilaterally and greater troch bilaterally. Pain is worse with prolonged walking and sitting  . Current pain 7-8/10.  Ibuprofen, naproxen, somewhat helpful--pt advised to discontinue NSAIDs d/t CKD.. Methocarbamol without much benefit. Tylenol 500 1-2 times daily. Also using topical lidocaine and diclofenac with benefit.    INTERVAL HISTORY 3/21/2024:  Riley Mracus presents for follow-up s/p Bilateral SIJ and Bilateral GTB on 3/7/2024.  She states this has provided 70% relief to her SIJ and 0% to the GTB. She continues robaxin 500 mg TID PRN and Lidocaine patches as needed with  moderate relief.  She denies any perceived side effects.   The patient denies fever/night sweats, urinary incontinence, bowel incontinence, significant weight changes, significant motor weakness or changes, or loss of sensations.  Her pain today is 7/10    INTERVAL HISTORY 2/26/2024:  Riley Marcus presents to the clinic for a follow-up appointment for chronic pain. Current pain intensity is 8/10.  Since the last visit, Riley Marcus states:  after her piriformis injection, she had 100% pain relief which lasted 3 weeks.  She however states that the pain has returned, and is worse in the morning.  It tends to feel better as the day progresses.  She continues to go to the gym 3 times a week and actively participates in a home exercise program to help her pain.       PRIOR HISTORY:   Riley Marcus presents to the clinic for the evaluation of lower back and bilateral buttocks pain. The pain started many years ago following no inciting event and symptoms have been worsening.The pain is located in the lower back area and radiates to the bilateral buttocks.  The pain is described as sharp and is rated as 8/10. The pain is rated with a score of  5/10 on the BEST day and a score of 8/10 on the WORST day.  Symptoms interfere with daily activity. The pain is exacerbated by walking, kneeling (putting close together), standing.  The pain is mitigated by moving legs far apart. The patient reports 7-8 hours of uninterrupted sleep per night.    Patient denies urinary incontinence, bowel incontinence, and significant motor weakness. She had a caudal MELL in 2015 which she didn't find beneficial    Physical Therapy/Home Exercise: yes, completed in October 2023. Continuing to do home exercise program.      Pain Disability Index Review:      9/23/2024    10:19 AM 3/21/2024     1:27 PM 12/11/2023     8:15 AM   Last 3 PDI Scores   Pain Disability Index (PDI) 49 32 56         Pain Medications:   - tylenol extended release   - ibuprofen     report:  Reviewed and consistent with medication use as prescribed.  03/11/2022 03/11/2022 1 Oxycodone-Acetaminophn 7.5-325 16.00 4 [facility]       Pain Procedures:   11/23/2015: Caudal MELL ([place])  1/18/2024: Bilateral piriformis 100% for 3 weeks (still resolution of sciatica pain)  3/7/2024 - Bilateral SIJ and Bilateral GTB - 70% in the SIJ 0% in GTB relief    Imaging:   MRI LUMBAR SPINE WITHOUT CONTRAST     CLINICAL HISTORY:  Low back pain, symptoms persist with > 6wks conservative treatment; Lumbago with sciatica, right side     TECHNIQUE:  Multiplanar, multisequence MR images were acquired from the thoracolumbar junction to the sacrum without the administration of contrast.     COMPARISON:  10/21/2019     FINDINGS:  Alignment: Grade 1 anterolisthesis  of L3-4.     Vertebrae: 5 lumbar-type vertebral bodies. No aggressive marrow replacement process or fracture.Nobone marrow edema .     Discs: Degenerative changes L2-L5 disc space levels with disc space narrowing L4-5.     Cord: Normal. Conus terminates at L1.     Degenerative findings:     T12-L1: There is no focal disc herniation. No significant central canal narrowing . No significant neural foraminal narrowing.     L1-L2: There is no focal disc herniation. No significant central canal narrowing . No significant neural foraminal narrowing.     L2-L3: There is no focal disc herniation.Moderate hypertrophic changes of facets/ligamentum flavum thickening. Mild central canal narrowing . No significant neural foraminal narrowing.     L3-L4: There is no focal disc herniation.Broad-based disc bulge, facet degenerative change and ligamentum flavum thickening which contribute to  severe central canal narrowing .  Moderate right and mild left neural foraminal narrowing.     L4-L5: There is no focal disc herniation.Broad-based disc bulge, facet degenerative change and ligamentum flavum thickening which contribute to  severe central canal narrowing . Moderate right and mild left neural foraminal narrowing.     L5-S1: There is no focal disc herniation.Mild hypertrophic changes of facets/ligamentum flavum thickening. Mild central canal narrowing . No significant neural foraminal narrowing.     Paraspinal muscles & soft tissues: Unremarkable.     Impression:     Lumbar spondylosis L2-L5 worst at L3-4 and L4-5.        Electronically signed by: De Ross MD  Date:                                            11/30/2023  Time:                                           09:36    Allergies:   Review of patient's allergies indicates:   Allergen Reactions    Sulfa (sulfonamide antibiotics) Itching and Rash     Other reaction(s): Rash       Current Medications:   Current Outpatient Medications   Medication Sig Dispense Refill     acetaminophen (TYLENOL) 500 MG tablet Take 500 mg by mouth every 6 (six) hours as needed for Pain.      amLODIPine (NORVASC) 10 MG tablet Take 1 tablet (10 mg total) by mouth once daily. 90 tablet 3    apple cider vinegar 500 mg Tab       ascorbic acid, vitamin C, (VITAMIN C) 500 MG tablet Take 500 mg by mouth once daily.      aspirin 81 mg Tab Take by mouth. 1 Tablet Oral Every day      B-complex with vitamin C (Z-BEC OR EQUIV) tablet       biotin 1,000 mcg Chew       cetirizine (ZYRTEC) 10 MG tablet Take 10 mg by mouth once daily.      cholecalciferol, vitamin D3, (VITAMIN D3) 25 mcg (1,000 unit) capsule Take 1,000 Units by mouth once daily.      colchicine (COLCRYS) 0.6 mg tablet Take 2 tablets by mouth, then take 1 tablet by mouth 1 hour later; okay to repeat in 24 hours if warranted 6 tablet 0    docusate sodium (COLACE) 100 MG capsule Take 100 mg by mouth once daily.      ezetimibe (ZETIA) 10 mg tablet Take 1 tablet (10 mg total) by mouth once daily. 90 tablet 3    ferrous sulfate (IRON) 325 mg (65 mg iron) Tab tablet       fluticasone propionate (FLONASE) 50 mcg/actuation nasal spray 2 sprays (100 mcg total) by Each Nostril route once daily. 16 g 12    GLUC MAURICE/CHONDRO MAURICE A/VIT C/MN (GLUCOSAMINE-CHONDROIT-VIT C-MN) 360-252-89-5 mg Tab Take by mouth. 1 Tablet Oral Every day      green tea leaf extract 315 mg Cap Take by mouth. 1 Capsule Oral Every day      hydrALAZINE (APRESOLINE) 25 MG tablet Take 1 tablet (25 mg total) by mouth every 12 (twelve) hours. 180 tablet 1    LIDOcaine (LIDODERM) 5 % Place 1 patch onto the skin once daily. Remove & Discard patch within 12 hours or as directed by MD 30 patch 2    lisinopriL (PRINIVIL,ZESTRIL) 40 MG tablet Take 1 tablet (40 mg total) by mouth once daily. 90 tablet 3    magnesium 250 mg Tab       melatonin 1 mg Chew Take by mouth.      methocarbamoL (ROBAXIN) 500 MG Tab Take 1 tablet (500 mg total) by mouth 3 (three) times daily as needed. 90 tablet 2    metoprolol  succinate (TOPROL-XL) 50 MG 24 hr tablet Take 1 tablet (50 mg total) by mouth once daily. 90 tablet 3    omega-3 fatty acids 1,000 mg Cap Take by mouth. 1 Capsule Oral Every day      pantoprazole (PROTONIX) 40 MG tablet Take 1 tablet (40 mg total) by mouth once daily. 90 tablet 3    rutin/hesp/bioflav/C/cfljxy618 (BIOFLEX ORAL) Take by mouth.      UNABLE TO FIND medication name: Tumeric 1000 mg daily      VITAMIN E,DL-ALPHA TOCOPHEROL, (VITAMIN E, BULK, MISC) by Misc.(Non-Drug; Combo Route) route.       No current facility-administered medications for this visit.       REVIEW OF SYSTEMS:    GENERAL:  No weight loss, malaise or fevers.  HEENT:  Negative for frequent or significant headaches.  NECK:  Negative for lumps, goiter, pain and significant neck swelling.  RESPIRATORY:  Negative for cough, wheezing or shortness of breath.  CARDIOVASCULAR:  Negative for chest pain, leg swelling or palpitations.  GI:  Negative for abdominal discomfort, blood in stools or black stools or change in bowel habits.  MUSCULOSKELETAL:  See HPI.  SKIN:  Negative for lesions, rash, and itching.  PSYCH:  Negative for sleep disturbance, mood disorder and recent psychosocial stressors.  HEMATOLOGY/LYMPHOLOGY:  Negative for prolonged bleeding, bruising easily or swollen nodes. +ASA 81mg  NEURO:   No history of headaches, syncope, paralysis, seizures or tremors.  All other reviewed and negative other than HPI.    Past Medical History:  Past Medical History:   Diagnosis Date    Arthritis     Blood transfusion     Branch retinal vein occlusion of left eye     Cataract     Chronic midline low back pain with bilateral sciatica 09/08/2023    Corneal abrasion 20 yrs    ? eye due to cls     GERD (gastroesophageal reflux disease)     Hyperlipidemia     Hypertension     Lattice degeneration        Past Surgical History:  Past Surgical History:   Procedure Laterality Date    Breast reduction      BREAST SURGERY      COLONOSCOPY N/A 07/20/2022     Procedure: COLONOSCOPY;  Surgeon: Chanda Hawkins MD;  Location: [facility] ENDO;  Service: Endoscopy;  Laterality: N/A;    ESOPHAGOGASTRODUODENOSCOPY N/A 07/20/2022    Procedure: ESOPHAGOGASTRODUODENOSCOPY (EGD);  Surgeon: Chanda Hawkins MD;  Location: [facility] ENDO;  Service: Endoscopy;  Laterality: N/A;    EYE SURGERY      HYSTERECTOMY      INJECTION Bilateral 01/18/2024    Procedure: INJECTION, BILATERAL PIRIFORMIS;  Surgeon: Raj Pierre MD;  [facility];  Service: Pain Management;  Laterality: Bilateral;  [phone]  2 WK F/U ERIBERTO    INJECTION OF JOINT Bilateral 03/07/2024    Procedure: INJECTION, JOINT BILATERAL SI AND BILATERAL GTB;  Surgeon: Raj Pierre MD;  Location: [facility] PAIN T;  Service: Pain Management;  Laterality: Bilateral;  [phone]  2 WK F/U ERIBERTO    LASER LAPAROSCOPY  1988    LASIK Bilateral     MINI-LAPAROTOMY  1988    OOPHORECTOMY      TOTAL REDUCTION MAMMOPLASTY         Family History:  Family History   Problem Relation Name Age of Onset    Arthritis Mother Misa M. mckinely     Heart disease Mother Misa M. mckinley     Hypertension Mother Misa M. mckinley     Cataracts Mother Misa M. mckinley     Heart disease Father Keith K. Mckinley     Hypertension Father Keith K. Mckinley     Stroke Father Keith K. Mckinley     Diabetes Sister Kenia NAZARIO. Rivera     Hypertension Sister Kenia NAZARIO. Rivera     Hypertension Sister Wendi Mckinley     Eczema Other niece     Arthritis Maternal Grandmother Luly Barr     Heart disease Maternal Grandmother Luly Barr     Hypertension Maternal Grandmother Luly Barr     Arthritis Maternal Grandfather Italoanya Barr     Arthritis Paternal Grandmother Hailey E. Mckinley     Heart disease Paternal Grandmother Hailey E. Mckinley     Stroke Paternal Grandmother Hailey E. Mckinley     Arthritis Paternal Grandfather Aashish Mckinley     No Known Problems Brother      No Known Problems Maternal Aunt      No  Known Problems Maternal Uncle      No Known Problems Paternal Aunt      No Known Problems Paternal Uncle      Ovarian cancer Neg Hx      Breast cancer Neg Hx      Anxiety disorder Neg Hx      Depression Neg Hx      Suicide Neg Hx      Amblyopia Neg Hx      Blindness Neg Hx      Cancer Neg Hx      Glaucoma Neg Hx      Macular degeneration Neg Hx      Retinal detachment Neg Hx      Strabismus Neg Hx      Thyroid disease Neg Hx      Colon cancer Neg Hx      Esophageal cancer Neg Hx         Social History:  Social History     Socioeconomic History    Marital status:     Number of children: 0   Occupational History    Occupation: RN     Employer: [employer]   Tobacco Use    Smoking status: Never     Passive exposure: Never    Smokeless tobacco: Never   Substance and Sexual Activity    Alcohol use: Not Currently     Comment: rare glass of wine  about 2 to 3 times/ year    Drug use: No    Sexual activity: Not Currently     Partners: Male     Birth control/protection: Post-menopausal, See Surgical Hx, None   Social History Narrative      several years ago    She has not been sexually-active since    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[place], [place]     Social Determinants of Health     Financial Resource Strain: Low Risk  (3/11/2024)    Overall Financial Resource Strain (CARDIA)     Difficulty of Paying Living Expenses: Not hard at all   Food Insecurity: No Food Insecurity (3/11/2024)    Hunger Vital Sign     Worried About Running Out of Food in the Last Year: Never true     Ran Out of Food in the Last Year: Never true   Transportation Needs: No Transportation Needs (3/11/2024)    PRAPARE - Transportation     Lack of Transportation (Medical): No     Lack of Transportation (Non-Medical): No   Physical Activity: Sufficiently Active (3/11/2024)    Exercise Vital Sign     Days of Exercise per Week: 4 days     Minutes of Exercise per Session: 90 min   Stress: No Stress Concern  Present (3/11/2024)    [facility] [employer] - Occupational Stress Questionnaire     Feeling of Stress : Not at all   Housing Stability: Low Risk  (3/11/2024)    Housing Stability Vital Sign     Unable to Pay for Housing in the Last Year: No     Number of Places Lived in the Last Year: 1     Unstable Housing in the Last Year: No       OBJECTIVE:    BP (!) 150/85   Pulse 65   Resp 18   Wt 82 kg (180 lb 12.4 oz)   SpO2 100%   BMI 33.06 kg/m²     PHYSICAL EXAMINATION:  General appearance: Well appearing, in no acute distress, alert and appropriately communicative.  Psych:  Mood and affect appropriate.  Skin: Skin color, texture, turgor normal, no rashes or lesions, in both upper and lower body.  Head/face:  Atraumatic, normocephalic.  Cor: regular rate  Pulm: non-labored breathing  GI: Abdomen non-distended and non-tender.  Back: Straight leg raising in the sitting and supine positions is negative to radicular pain. No pain to palpation over the spine or paraspinal muscles. Normal range of motion without pain reproduction.  Extremities: Peripheral joint ROM is full and pain free without obvious instability or laxity in all four extremities. No deformities, edema, or skin discoloration. Good capillary refill.  Musculoskeletal: No gross deformities on inspection. Significant TTP over the bilateral ischial bursa and bilateral trochanteric bursa. .  Bilateral upper and lower extremity strength is normal and symmetric.  No atrophy or tone abnormalities are noted.  Neuro: Bilateral upper and lower extremity coordination and muscle stretch reflexes are physiologic and symmetric.  Negative Clonus. No loss of sensation is noted.  Gait: antalgic.    CMP  Sodium   Date Value Ref Range Status   08/07/2024 140 136 - 145 mmol/L Final     Potassium   Date Value Ref Range Status   08/07/2024 3.9 3.5 - 5.1 mmol/L Final     Chloride   Date Value Ref Range Status   08/07/2024 104 95 - 110 mmol/L Final     CO2    Date Value Ref Range Status   08/07/2024 25 23 - 29 mmol/L Final     Glucose   Date Value Ref Range Status   08/07/2024 94 70 - 110 mg/dL Final     BUN   Date Value Ref Range Status   08/07/2024 32 (H) 8 - 23 mg/dL Final     Creatinine   Date Value Ref Range Status   08/07/2024 1.8 (H) 0.5 - 1.4 mg/dL Final     Calcium   Date Value Ref Range Status   08/07/2024 10.2 8.7 - 10.5 mg/dL Final     Total Protein   Date Value Ref Range Status   08/07/2024 7.3 6.0 - 8.4 g/dL Final     Albumin   Date Value Ref Range Status   08/07/2024 3.5 3.5 - 5.2 g/dL Final     Total Bilirubin   Date Value Ref Range Status   08/07/2024 0.5 0.1 - 1.0 mg/dL Final     Comment:     For infants and newborns, interpretation of results should be based  on gestational age, weight and in agreement with clinical  observations.    Premature Infant recommended reference ranges:  Up to 24 hours.............<8.0 mg/dL  Up to 48 hours............<12.0 mg/dL  3-5 days..................<15.0 mg/dL  6-29 days.................<15.0 mg/dL       Alkaline Phosphatase   Date Value Ref Range Status   08/07/2024 59 55 - 135 U/L Final     AST   Date Value Ref Range Status   08/07/2024 20 10 - 40 U/L Final     ALT   Date Value Ref Range Status   08/07/2024 16 10 - 44 U/L Final     Anion Gap   Date Value Ref Range Status   08/07/2024 11 8 - 16 mmol/L Final     eGFR   Date Value Ref Range Status   08/07/2024 30 (A) >60 mL/min/1.73 m^2 Final   11/21/2023 31 (L) > OR = 60 mL/min/1.73m2 Final         ASSESSMENT: 69 y.o. year old female with buttock/hip pain, consistent with:    1. Greater trochanteric bursitis, unspecified laterality  Procedure Order to Pain Management      2. Ischial bursitis, unspecified laterality        3. Myofascial pain syndrome            IMPRESSION: Riley Marcus presents today for bilateral hip and bilateral buttock.  Symptoms are primarily related to greater trochanteric pain syndrome and ischial bursitis bilaterally. Recent XR imaging  reviewed with evidence of diffuse pelvic enthesopathy which is likely contributing to symptoms.  At this point, she has not had significant benefit from her bilateral GTB steroid injections, however her primary complaints seems to be associated with greater trochanteric bursitis. We will consider an additional diagnostic option to manage her GTB pain.    PLAN:   - I have stressed the importance of physical activity and a home exercise plan to help with pain and improve health.  - Patient can continue with medications for now since they are providing benefits, using them appropriately, and without side effects. The following recommendations to medication changes:    - Discontinue NSAIDs d/t CKD.    - Increase dosing of Tylenol to 1000mg TID.    - Continue topical medications.    - Continue Robaxin.   - Schedule patient for Bilateral GTB Blocks (without steroid) for diagnostic purposes. If successful in relieving patient's pain, can proceed with cooled RF of bilateral greater trochanteric bursae.   - we can consider ischial bursa injection once she has considered workup/management for her GTB pain.  - Continue physician prescribed HEP/HSP.   - Counseled patient regarding the importance of activity modification and physical therapy.  - Will call pt to assess response to blocks.     The above plan and management options were discussed at length with patient. Patient is in agreement with the above and verbalized understanding.    I performed a history, review of systems, and physical exam with the patient. The assessment and plan were discussed and agreed upon with Dr. Pierre, the attending of record, before sharing with the patient. The face to face encounter time, including answering all patient questions, was 20 minutes.     Eddie Michaels M.D.  PGY-5  Pain Fellow  09/23/2024     I have personally reviewed the history and exam of this patient and agree with the student/resident/fellow/NPs note as stated above.  I have  seen the patient and personally examined them as appropriate.  I have personally discussed the plan of care with the provider and patient and have reviewed and/or edited the plan of care as appropriate.  All questions have been answered to the best of my ability.    Raj iPerre MD PhD

## 2024-09-23 NOTE — TELEPHONE ENCOUNTER
----- Message from Solomon Rivera MD sent at 2024 10:49 AM CDT -----  Regarding: Order for TOLU SILVERIO    Patient Name: TOLU SILVERIO([id number])  Sex: Female  : 1955      PCP: FILI WILSON    Center: [facility] CENTRAL BILLING OFFICE     Types of orders made on 2024: Procedure Request    Order Date:2024  Ordering User:SOLOMON RIVERA [[id number]]  Encounter Provider:Raj Pierre MD [[id number]]  Authorizing   Provider: Solomon Rivera MD [[id number]]  Supervising Provider:CARA LUCIO [[id number]]  Type of Supervision:Supervision Required  Department:[facility] PAIN MANAGEMENT[[id number]]    Common Order Information  Procedure -> Other (Specify location and laterality) Cmt: bilateral Greater             Trochanteric Bursae Block without Steroid.    Order Specific Information  Order: Procedure Order to Pain Management [Custom: REF1  68]  Order #:          [id number]: 1 FUTURE    Priority: Routine  Class: Clinic Performed    Future Order Information      Expires on:2025            Expected by:2024                   Associated Diagnoses      M70.60 Greater trochanteric bursitis, unspecified laterality    [id number] [id number]      Physician -> Ignacio         Is patient on anti-coagulants? -> No         Facility Name  : -> [facility]         Follow-up: -> 2 weeks           Priority: Routine  Class: Clinic Performed    Future Order Information      Expires on:2025            Expected by:2024                   Associated Diagnoses      M70.60 Greater trochanteric bursitis, unspecified laterality    [id number] LUCIO      Procedure -> Other (Specify location and laterality) Cmt: bilateral Greater                   Trochanteric Bursae Block without Steroid.        Physician -> Ignacio         Is patient on anti-coagulants? -> No         Facility Name: -> [facility]         Follow-up: -> 2 weeks

## 2024-09-25 DIAGNOSIS — Z00.00 ENCOUNTER FOR MEDICARE ANNUAL WELLNESS EXAM: ICD-10-CM

## 2024-09-27 ENCOUNTER — TELEPHONE (OUTPATIENT)
Dept: PAIN MEDICINE | Facility: CLINIC | Age: 69
End: 2024-09-27
Payer: MEDICARE

## 2024-10-02 NOTE — PRE-PROCEDURE INSTRUCTIONS
Patient reviewed on 10/2/2024.  Okay to proceed at [facility] [place]. The following pre-procedure instructions and arrival time have been reviewed with patient via phone and sent to patient portal for review.  Patient verbalized an understanding.        Dear Riley,     Please read over the following pre-procedure instructions in it's entirety as there is helpful information here to get you well prepared for your upcoming procedure.     You are scheduled for a procedure with Dr. YEN Pierre on 10/4/2024.     [facility] at the corner of [facility] and [facility]. It is in the [place] [employer] [place] next to [employer]. The address is: [address] [place] [address]. Take the elevator to the 2nd floor.       Registration check in time: 10:20 am  Procedure scheduled for time: 11:20 am     If you are receiving sedation, you CANNOT drive yourself and must have a responsible friend or family member (no rideshare) to drive you home.        You should take any medications that you routinely take for blood pressure, heart medications, thyroid, cholesterol, etc.      The fasting restrictions are dependent on whether or not you are receiving sedation. Sedation is not available for all procedures.      Your fasting instructions/Sedation type are as follow:  No sedation.  You do not need to fast before this procedure.  You can eat and drink like normal.  You can drive yourself (with stipulations).  There are some rare cases where you may need to call an uber if you are unable to drive after the procedure due to weakness/dizziness.         If you are on blood thinners, you need to follow the anticoagulation instructions that had been discussed previously. You should only stop the blood thinners if it was approved by your primary care physician or your cardiologist. In the event that you are not able to stop your blood thinners, a blood thinner was not listed on your medication list, or we were not able to get clearance from  your cardiologist, then the procedure may have to be postponed/canceled.      IF you were told to stop your blood thinners, this is how long you should generally hold some of the more common ones. Remember that stopping blood thinners is only necessary for certain procedures. If you are unsure of your instructions, please call us.   Aspirin - 5 days  Plavix/Clopidogrel - 7 days  Warfarin / Coumadin - 5 days  Eliquis - 3 days  Pradaxa/Dabigatran - 4 days  Xarelto/Rivaroxaban - 3 days     If you are a diabetic, do not take your medication if you will be fasting, but bring it with you. Please plan on being here for roughly 2-3 hours.     Please call us if you have been sick (running fever, having any flu-like symptoms) or have been taking ANTIBIOTICS in the past 2 weeks or had any outpatient procedures other than with us (colonoscopy, endoscopy, OBGYN, dental, etc.).      If you have been previously COVID positive, you will need to hold off on your procedure until you are symptom free for 10 days. If you did not have any symptoms, you can have your procedure 10 days from your positive test result.         On the morning of your procedure:  *HOLD ALL VITAMINS, MINERALS, HERBS (INCLUDING HERBAL TEAS) AND SUPPLEMENTS  *SHOWER WITH ANTIBACTERIAL SOAP (EX. DIAL) NIGHT BEFORE AND MORNING OF PROCEDURE  *DO NOT APPLY ANY LOTIONS, OILS, POWDERS, PERFUME/COLOGNE, OINTMENTS, GELS, CREAMS, MAKEUP OR DEODORANT TO YOUR SKIN MORNING OF PROCEDURE  *LEAVE JEWELRY AND ANY VALUABLES AT HOME  *WEAR LOOSE COMFORTABLE CLOTHING         Please reply to this portal message as receipt of delivery.     Thank you,  [facility]  [place], LPN [facility]  Pre-Admit

## 2024-10-04 ENCOUNTER — HOSPITAL ENCOUNTER (OUTPATIENT)
Facility: HOSPITAL | Age: 69
Discharge: HOME | End: 2024-10-04
Attending: STUDENT IN AN ORGANIZED HEALTH CARE EDUCATION/TRAINING PROGRAM | Admitting: STUDENT IN AN ORGANIZED HEALTH CARE EDUCATION/TRAINING PROGRAM
Payer: MEDICARE

## 2024-10-04 VITALS
HEART RATE: 90 BPM | OXYGEN SATURATION: 99 % | SYSTOLIC BLOOD PRESSURE: 187 MMHG | RESPIRATION RATE: 18 BRPM | BODY MASS INDEX: 32.2 KG/M2 | WEIGHT: 175 LBS | DIASTOLIC BLOOD PRESSURE: 84 MMHG | TEMPERATURE: 98 F | HEIGHT: 62 IN

## 2024-10-04 DIAGNOSIS — M70.60 GREATER TROCHANTERIC BURSITIS, UNSPECIFIED LATERALITY: Primary | ICD-10-CM

## 2024-10-04 DIAGNOSIS — G89.29 CHRONIC PAIN: ICD-10-CM

## 2024-10-04 PROCEDURE — 64450 NJX AA&/STRD OTHER PN/BRANCH: CPT | Mod: 50,,, | Performed by: STUDENT IN AN ORGANIZED HEALTH CARE EDUCATION/TRAINING PROGRAM

## 2024-10-04 PROCEDURE — 63600175 PHARM REV CODE 636 W HCPCS: Performed by: STUDENT IN AN ORGANIZED HEALTH CARE EDUCATION/TRAINING PROGRAM

## 2024-10-04 PROCEDURE — 64450 NJX AA&/STRD OTHER PN/BRANCH: CPT | Mod: 50 | Performed by: STUDENT IN AN ORGANIZED HEALTH CARE EDUCATION/TRAINING PROGRAM

## 2024-10-04 RX ORDER — BUPIVACAINE HYDROCHLORIDE 2.5 MG/ML
INJECTION, SOLUTION EPIDURAL; INFILTRATION; INTRACAUDAL
Status: DISCONTINUED | OUTPATIENT
Start: 2024-10-04 | End: 2024-10-04 | Stop reason: HOSPADM

## 2024-10-04 RX ORDER — ALPRAZOLAM 0.5 MG/1
0.5 TABLET, ORALLY DISINTEGRATING ORAL ONCE AS NEEDED
Status: DISCONTINUED | OUTPATIENT
Start: 2024-10-04 | End: 2024-10-04 | Stop reason: HOSPADM

## 2024-10-04 RX ORDER — LIDOCAINE HYDROCHLORIDE 20 MG/ML
INJECTION, SOLUTION EPIDURAL; INFILTRATION; INTRACAUDAL; PERINEURAL
Status: DISCONTINUED | OUTPATIENT
Start: 2024-10-04 | End: 2024-10-04 | Stop reason: HOSPADM

## 2024-10-04 NOTE — DISCHARGE SUMMARY
Discharge Note  Short Stay      SUMMARY     Admit Date: 10/4/2024    Attending Physician: Raj Pierre MD PhD    Discharge Physician: Raj Pierre      Discharge Date: 10/4/2024 12:13 PM    Procedure(s) (LRB):  bilateral Greater Trochanteric Bursae Block without Steroid. (Bilateral)    Final Diagnosis: Greater trochanteric bursitis, unspecified laterality [M70.60]    Disposition: Home or self care    Patient Instructions:   Current Discharge Medication List        CONTINUE these medications which have NOT CHANGED    Details   acetaminophen (TYLENOL) 500 MG tablet Take 500 mg by mouth every 6 (six) hours as needed for Pain.      amLODIPine (NORVASC) 10 MG tablet Take 1 tablet (10 mg total) by mouth once daily.  Qty: 90 tablet, Refills: 3    Comments: .  Associated Diagnoses: Primary hypertension      apple cider vinegar 500 mg Tab       ascorbic acid, vitamin C, (VITAMIN C) 500 MG tablet Take 500 mg by mouth once daily.      B-complex with vitamin C (Z-BEC OR EQUIV) tablet       biotin 1,000 mcg Chew       cholecalciferol, vitamin D3, (VITAMIN D3) 25 mcg (1,000 unit) capsule Take 1,000 Units by mouth once daily.      docusate sodium (COLACE) 100 MG capsule Take 100 mg by mouth once daily.      ezetimibe (ZETIA) 10 mg tablet Take 1 tablet (10 mg total) by mouth once daily.  Qty: 90 tablet, Refills: 3    Associated Diagnoses: Myalgia due to statin; Pure hypercholesterolemia      green tea leaf extract 315 mg Cap Take by mouth. 1 Capsule Oral Every day      hydrALAZINE (APRESOLINE) 25 MG tablet Take 1 tablet (25 mg total) by mouth every 12 (twelve) hours.  Qty: 180 tablet, Refills: 1    Comments: .  Associated Diagnoses: Primary hypertension      lisinopriL (PRINIVIL,ZESTRIL) 40 MG tablet Take 1 tablet (40 mg total) by mouth once daily.  Qty: 90 tablet, Refills: 3    Comments: .  Associated Diagnoses: Primary hypertension      magnesium 250 mg Tab       metoprolol succinate (TOPROL-XL) 50 MG 24 hr tablet Take 1 tablet  (50 mg total) by mouth once daily.  Qty: 90 tablet, Refills: 3    Comments: .  Associated Diagnoses: Primary hypertension      pantoprazole (PROTONIX) 40 MG tablet Take 1 tablet (40 mg total) by mouth once daily.  Qty: 90 tablet, Refills: 3    Associated Diagnoses: Gastroesophageal reflux disease, unspecified whether esophagitis present      rutin/hesp/bioflav/[id number] (BIOFLEX ORAL) Take by mouth.      VITAMIN E,DL-ALPHA TOCOPHEROL, (VITAMIN E, BULK, MISC) by Misc.(Non-Drug; Combo Route) route.      aspirin 81 mg Tab Take by mouth. 1 Tablet Oral Every day      cetirizine (ZYRTEC) 10 MG tablet Take 10 mg by mouth once daily.      colchicine (COLCRYS) 0.6 mg tablet Take 2 tablets by mouth, then take 1 tablet by mouth 1 hour later; okay to repeat in 24 hours if warranted  Qty: 6 tablet, Refills: 0    Associated Diagnoses: Idiopathic chronic gout without tophus, unspecified site      ferrous sulfate (IRON) 325 mg (65 mg iron) Tab tablet       fluticasone propionate (FLONASE) 50 mcg/actuation nasal spray 2 sprays (100 mcg total) by Each Nostril route once daily.  Qty: 16 g, Refills: 12      GLUC MAURICE/CHONDRO MAURICE A/VIT C/MN (GLUCOSAMINE-CHONDROIT-VIT C-MN) 499-674-86-5 mg Tab Take by mouth. 1 Tablet Oral Every day      LIDOcaine (LIDODERM) 5 % Place 1 patch onto the skin once daily. Remove & Discard patch within 12 hours or as directed by MD  Qty: 30 patch, Refills: 2    Associated Diagnoses: Sacroiliac joint pain; Greater trochanteric bursitis, unspecified laterality      melatonin 1 mg Chew Take by mouth.      methocarbamoL (ROBAXIN) 500 MG Tab Take 1 tablet (500 mg total) by mouth 3 (three) times daily as needed.  Qty: 90 tablet, Refills: 2    Associated Diagnoses: Spinal stenosis of lumbar region with neurogenic claudication; Myofascial pain syndrome      omega-3 fatty acids 1,000 mg Cap Take by mouth. 1 Capsule Oral Every day      UNABLE TO FIND medication name: Tumeric 1000 mg daily                 Discharge  Diagnosis: Greater trochanteric bursitis, unspecified laterality [M70.60]  Condition on Discharge: Stable with no complications to procedure   Diet on Discharge: Same as before.  Activity: as per instruction sheet.  Discharge to: Home with a responsible adult.  Follow up: 2-4 weeks       Please call my office or pager at [phone] if experienced any weakness or loss of sensation, fever > 101.5, pain uncontrolled with oral medications, persistent nausea/vomiting/or diarrhea, redness or drainage from the incisions, or any other worrisome concerns. If physician on call was not reached or could not communicate with our office for any reason please go to the nearest emergency department      Raj Pierre MD PhD

## 2024-10-04 NOTE — OP NOTE
"Greater Trochanteric Bursa BLOCK under Fluoroscopic Guidance    The procedure, risks, benefits, and options were discussed with the patient. There are no contraindications to the procedure. The patent expressed understanding and agreed to the procedure. Informed written consent was obtained prior to the start of the procedure and can be found in the patient's chart.    PATIENT NAME: Riley Marcus  MRN: [id number]     DATE OF PROCEDURE: 10/4/2024    PROCEDURE: Bilateral Greater Trochanteric Bursa BLOCK under Fluoroscopic Guidance    PRE-OP DIAGNOSIS: Greater trochanteric bursitis of Bilateral hip [M70.62]    POST-OP DIAGNOSIS: Same    PHYSICIAN: Raj Pierre MD    ASSISTANTS: None    MEDICATIONS INJECTED: Preservative-free Bupivacine 0.25%    LOCAL ANESTHETIC INJECTED: Xylocaine 2%     SEDATION: None    ESTIMATED BLOOD LOSS: None    COMPLICATIONS: None    TECHNIQUE: Time-out was performed to identify the patient and procedure to be performed. With the patient laying in a prone position, the surgical area was prepped and draped in the usual sterile fashion using ChloraPrep and a fenestrated drape. The area overlying the RIGHT greater trochanteric bursa was determined under fluoroscopy guidance. Skin anesthesia was achieved by injecting Lidocaine 2% over the injection site.  A 5" 22G needle was then advanced under fluoroscopy in the AP view to the region of the Trochanteric Branches of the Nervus Femoralis.  After negative aspiration for blood was confirmed, 1 mL of the bupivacaine 0.25% was injected slowly at each site. The needles were removed and bleeding was nil. A sterile dressing was applied. No specimens collected.     The needles were removed and bleeding was nil. A sterile dressing was applied. No specimens collected. The patient tolerated the procedure well.    The patient was monitored after the procedure in the recovery area. They were given post-procedure and discharge instructions to follow at home. The " patient was discharged in a stable condition.    PRE-PROCEDURE PAIN SCORE: 7-9/10    POST-PROCEDURE PAIN SCORE: 0/10    Raj Pierre MD

## 2024-10-04 NOTE — DISCHARGE INSTRUCTIONS
[email or web address]  Dr. Raj Pierre  Messaging service # [phone]    POST-PROCEDURE INSTRUCTIONS:  HELPFUL TIPS:    Except for block procedures (medial branch blocks, genicular blocks, hip/shoulder blocks), most procedures take about 2 weeks to start seeing the full effect toward your pain.  If you feel like the pain relief goes away after a day, please wait up to 2 weeks to decide if the procedure provided you any relief    If you had a block procedure (medial branch blocks, genicular blocks, hip/shoulder blocks), you MUST submit your pain diary and percentage relief scores to proceed with the next block and/or procedure.  Please submit the diary/percentages through the [facility] OR you can call [phone] ([employer]) OR [phone] ([employer]) during clinic hours (Monday - Friday, 8AM - 5PM)    Follow up in 2 weeks with either the provider that suggested this procedure OR with Dr. Pierre (including his team members at [facility]).  You may already have a follow up appointment scheduled.  If not, you may make an appointment through the [facility] or you can contact the office that suggested your procedure.  If you would like to make an appointment with Dr. Pierre, you may do so through the [facility] Portal OR you can call [phone] ([employer]) OR [phone] ([employer]) during clinic hours (Monday - Friday, 8AM - 5PM).      What you need to do:    Keep a record of your response to the injection you had today.    How much relief did you get?   When did the relief start and how long did it last?  Were you able to decrease the use of any of your pain medications?  Were you able to increase your level of activity?  How long did the relief last?    What to watch out for:    If you experience any of the following symptoms after your procedure, please notify the messaging service immediately (see above for contact information):   fever (increased oral temperature)   bleeding or  swelling at the injection site,    drainage, rash or redness at the injection site    possible signs of infection    increased pain at the injection site   worsening of your usual pain   severe headache   new or worsening numbness    new arm and/or leg weakness, or    changes in bowel and/or bladder function: urinating or defecating on yourself and not knowing that you did it.    PLEASE FOLLOW ALL INSTRUCTIONS CAREFULLY     Do not engage in strenuous activity (e.g., lifting or pushing heavy objects or repeated bending) for 24 hours.     Do not take a bath, swim or use Jacuzzi for 24 hours after procedure. (A shower is fine).   Remove any Band-Aids when you get home.    Use cold/ice, as needed for comfort.  We recommend the use of cold therapy alternating on for 20 minutes, off for 20 minutes.    Do not apply direct heat (heating pad or heat packs) to the injection site for 24 hours.     Resume your usual medications, unless instructed otherwise by your Pain Physician.     If you are on warfarin (Coumadin) or other blood thinner, resume this medication as instructed by your prescribing Physician.    IF AT ANY POINT YOU ARE VERY CONCERNED ABOUT YOUR SYMPTOMS, PLEASE GO TO THE EMERGENCY ROOM.    If you develop worsening pain, weakness, numbness, lose bowel or bladder control (i.e., having an accident where you did not even know you had to go to the bathroom and suddenly noticed you soiled yourself), saddle anesthesia (a loss of sensation restricted to the area of the buttocks, anus and between the legs -- i.e., those parts of your body that would touch a saddle if you were sitting on one) you need to go immediately to the emergency department for evaluation and treatment.    ----------------------------------------------------------------------------------------------------------------------------------------------------------------  If you received Sedation please read the following instructions:  POST SEDATION  INSTRUCTIONS    Today you received intravenous medication (also known as sedation) that was used to help you relax and/or decrease discomfort during your procedure. This medication will be acting in your body for the next 24 hours, so you might feel a little tired or sleepy. This feeling will slowly wear off.   Common side effects associated with these medications include: drowsiness, dizziness, sleepiness, confusion, feeling excited, difficulty remembering things, lack of steadiness with walking or balance, loss of fine muscle control, slowed reflexes, difficulty focusing, and blurred vision.  Some over-the-counter and prescription medications (e.g., muscle relaxants, opioids, mood-altering medications, sedatives/hypnotics, antihistamines) can interact with the intravenous medication you received and cause an increased risk of the side effects listed above in addition to other potentially life threatening side effects. Use extreme caution if you are taking such medications, and consult with your Pain Physician or prescribing physician if you have any questions.  For the next 12-24 hours:    DO NOT--Drive a car, operate machinery or power tools   DO NOT--Drink any alcoholic beverages (not even beer), they may dangerously increase the risk of side effects.    DO NOT--Make any important legal or business decisions or sign important documents.  We advise you to have someone to assist you at home. Move slowly and carefully. Do not make sudden changes in position. Be aware of dizziness or light-headedness and move accordingly.   If you seek medical treatment within 24 hours, let the nurse or doctor caring for you know that you have received the above medications. If you have any questions or concerns related to your sedation or treatment today please contact us.

## 2024-11-06 DIAGNOSIS — I10 PRIMARY HYPERTENSION: ICD-10-CM

## 2024-11-07 RX ORDER — HYDRALAZINE HYDROCHLORIDE 25 MG/1
25 TABLET, FILM COATED ORAL EVERY 12 HOURS
Qty: 60 TABLET | Refills: 0 | Status: SHIPPED | OUTPATIENT
Start: 2024-11-07

## 2024-11-07 NOTE — TELEPHONE ENCOUNTER
No care due was identified.  [facility] Embedded Care Due Messages. Reference number: [id number].   11/06/2024 6:05:34 PM CST

## 2024-11-21 ENCOUNTER — LAB VISIT (OUTPATIENT)
Dept: LAB | Facility: HOSPITAL | Age: 69
End: 2024-11-21
Attending: INTERNAL MEDICINE
Payer: MEDICARE

## 2024-11-21 DIAGNOSIS — I10 PRIMARY HYPERTENSION: ICD-10-CM

## 2024-11-21 DIAGNOSIS — M10.9 GOUT, UNSPECIFIED CAUSE, UNSPECIFIED CHRONICITY, UNSPECIFIED SITE: ICD-10-CM

## 2024-11-21 DIAGNOSIS — E55.9 VITAMIN D DEFICIENCY: ICD-10-CM

## 2024-11-21 DIAGNOSIS — R80.1 PERSISTENT PROTEINURIA: ICD-10-CM

## 2024-11-21 DIAGNOSIS — N18.32 STAGE 3B CHRONIC KIDNEY DISEASE: ICD-10-CM

## 2024-11-21 DIAGNOSIS — N25.81 SECONDARY HYPERPARATHYROIDISM OF RENAL ORIGIN: ICD-10-CM

## 2024-11-21 LAB
ALBUMIN SERPL BCP-MCNC: 3.5 G/DL (ref 3.5–5.2)
ALP SERPL-CCNC: 66 U/L (ref 40–150)
ALT SERPL W/O P-5'-P-CCNC: 20 U/L (ref 10–44)
ANION GAP SERPL CALC-SCNC: 8 MMOL/L (ref 8–16)
AST SERPL-CCNC: 20 U/L (ref 10–40)
BACTERIA #/AREA URNS HPF: NORMAL /HPF
BASOPHILS # BLD AUTO: 0.05 K/UL (ref 0–0.2)
BASOPHILS NFR BLD: 0.9 % (ref 0–1.9)
BILIRUB SERPL-MCNC: 0.4 MG/DL (ref 0.1–1)
BILIRUB UR QL STRIP: NEGATIVE
BUN SERPL-MCNC: 24 MG/DL (ref 8–23)
CALCIUM SERPL-MCNC: 10.1 MG/DL (ref 8.7–10.5)
CHLORIDE SERPL-SCNC: 107 MMOL/L (ref 95–110)
CLARITY UR: CLEAR
CO2 SERPL-SCNC: 28 MMOL/L (ref 23–29)
COLOR UR: YELLOW
CREAT SERPL-MCNC: 1.8 MG/DL (ref 0.5–1.4)
CREAT UR-MCNC: 220.2 MG/DL (ref 15–325)
DIFFERENTIAL METHOD BLD: ABNORMAL
EOSINOPHIL # BLD AUTO: 0.3 K/UL (ref 0–0.5)
EOSINOPHIL NFR BLD: 4.5 % (ref 0–8)
ERYTHROCYTE [DISTWIDTH] IN BLOOD BY AUTOMATED COUNT: 12.8 % (ref 11.5–14.5)
EST. GFR  (NO RACE VARIABLE): 30 ML/MIN/1.73 M^2
FERRITIN SERPL-MCNC: 92 NG/ML (ref 20–300)
GLUCOSE SERPL-MCNC: 101 MG/DL (ref 70–110)
GLUCOSE UR QL STRIP: NEGATIVE
HCT VFR BLD AUTO: 39.2 % (ref 37–48.5)
HGB BLD-MCNC: 12.4 G/DL (ref 12–16)
HGB UR QL STRIP: NEGATIVE
HYALINE CASTS #/AREA URNS LPF: 1 /LPF
IMM GRANULOCYTES # BLD AUTO: 0.01 K/UL (ref 0–0.04)
IMM GRANULOCYTES NFR BLD AUTO: 0.2 % (ref 0–0.5)
IRON SERPL-MCNC: 101 UG/DL (ref 30–160)
KETONES UR QL STRIP: NEGATIVE
LEUKOCYTE ESTERASE UR QL STRIP: NEGATIVE
LYMPHOCYTES # BLD AUTO: 1.3 K/UL (ref 1–4.8)
LYMPHOCYTES NFR BLD: 22.8 % (ref 18–48)
MAGNESIUM SERPL-MCNC: 2.2 MG/DL (ref 1.6–2.6)
MCH RBC QN AUTO: 29.7 PG (ref 27–31)
MCHC RBC AUTO-ENTMCNC: 31.6 G/DL (ref 32–36)
MCV RBC AUTO: 94 FL (ref 82–98)
MICROSCOPIC COMMENT: NORMAL
MONOCYTES # BLD AUTO: 0.5 K/UL (ref 0.3–1)
MONOCYTES NFR BLD: 9.2 % (ref 4–15)
NEUTROPHILS # BLD AUTO: 3.4 K/UL (ref 1.8–7.7)
NEUTROPHILS NFR BLD: 62.4 % (ref 38–73)
NITRITE UR QL STRIP: NEGATIVE
NRBC BLD-RTO: 0 /100 WBC
PH UR STRIP: 6 [PH] (ref 5–8)
PHOSPHATE SERPL-MCNC: 3.4 MG/DL (ref 2.7–4.5)
PLATELET # BLD AUTO: 226 K/UL (ref 150–450)
PMV BLD AUTO: 11.2 FL (ref 9.2–12.9)
POTASSIUM SERPL-SCNC: 4.6 MMOL/L (ref 3.5–5.1)
PROT SERPL-MCNC: 7.7 G/DL (ref 6–8.4)
PROT UR QL STRIP: ABNORMAL
PROT UR-MCNC: 74 MG/DL
PROT/CREAT UR: 0.34 MG/G{CREAT} (ref 0–0.2)
PTH-INTACT SERPL-MCNC: 121.8 PG/ML (ref 9–77)
RBC # BLD AUTO: 4.18 M/UL (ref 4–5.4)
RBC #/AREA URNS HPF: 0 /HPF (ref 0–4)
SATURATED IRON: 29 % (ref 20–50)
SODIUM SERPL-SCNC: 143 MMOL/L (ref 136–145)
SP GR UR STRIP: 1.02 (ref 1–1.03)
SQUAMOUS #/AREA URNS HPF: 1 /HPF
TOTAL IRON BINDING CAPACITY: 345 UG/DL (ref 250–450)
TRANSFERRIN SERPL-MCNC: 233 MG/DL (ref 200–375)
URATE SERPL-MCNC: 8 MG/DL (ref 2.4–5.7)
URN SPEC COLLECT METH UR: ABNORMAL
UROBILINOGEN UR STRIP-ACNC: NEGATIVE EU/DL
WBC # BLD AUTO: 5.52 K/UL (ref 3.9–12.7)
WBC #/AREA URNS HPF: 0 /HPF (ref 0–5)

## 2024-11-21 PROCEDURE — 85025 COMPLETE CBC W/AUTO DIFF WBC: CPT | Performed by: INTERNAL MEDICINE

## 2024-11-21 PROCEDURE — 83970 ASSAY OF PARATHORMONE: CPT | Performed by: INTERNAL MEDICINE

## 2024-11-21 PROCEDURE — 81000 URINALYSIS NONAUTO W/SCOPE: CPT | Performed by: INTERNAL MEDICINE

## 2024-11-21 PROCEDURE — 84466 ASSAY OF TRANSFERRIN: CPT | Performed by: INTERNAL MEDICINE

## 2024-11-21 PROCEDURE — 82728 ASSAY OF FERRITIN: CPT | Performed by: INTERNAL MEDICINE

## 2024-11-21 PROCEDURE — 82570 ASSAY OF URINE CREATININE: CPT | Performed by: INTERNAL MEDICINE

## 2024-11-21 PROCEDURE — 84100 ASSAY OF PHOSPHORUS: CPT | Performed by: INTERNAL MEDICINE

## 2024-11-21 PROCEDURE — 83735 ASSAY OF MAGNESIUM: CPT | Performed by: INTERNAL MEDICINE

## 2024-11-21 PROCEDURE — 80053 COMPREHEN METABOLIC PANEL: CPT | Performed by: INTERNAL MEDICINE

## 2024-11-21 PROCEDURE — 36415 COLL VENOUS BLD VENIPUNCTURE: CPT | Performed by: INTERNAL MEDICINE

## 2024-11-21 PROCEDURE — 84550 ASSAY OF BLOOD/URIC ACID: CPT | Performed by: INTERNAL MEDICINE

## 2024-11-25 ENCOUNTER — PATIENT MESSAGE (OUTPATIENT)
Dept: FAMILY MEDICINE | Facility: CLINIC | Age: 69
End: 2024-11-25
Payer: MEDICARE

## 2024-12-02 ENCOUNTER — HOSPITAL ENCOUNTER (OUTPATIENT)
Dept: RADIOLOGY | Facility: HOSPITAL | Age: 69
Discharge: HOME | End: 2024-12-02
Attending: INTERNAL MEDICINE
Payer: MEDICARE

## 2024-12-02 DIAGNOSIS — Z12.31 ENCOUNTER FOR SCREENING MAMMOGRAM FOR BREAST CANCER: ICD-10-CM

## 2024-12-02 PROCEDURE — 77067 SCR MAMMO BI INCL CAD: CPT | Mod: 26,,, | Performed by: RADIOLOGY

## 2024-12-02 PROCEDURE — 77067 SCR MAMMO BI INCL CAD: CPT | Mod: TC

## 2024-12-02 PROCEDURE — 77063 BREAST TOMOSYNTHESIS BI: CPT | Mod: 26,,, | Performed by: RADIOLOGY

## 2024-12-06 ENCOUNTER — OFFICE VISIT (OUTPATIENT)
Dept: FAMILY MEDICINE | Facility: CLINIC | Age: 69
End: 2024-12-06
Payer: MEDICARE

## 2024-12-06 VITALS
TEMPERATURE: 98 F | DIASTOLIC BLOOD PRESSURE: 68 MMHG | BODY MASS INDEX: 33.47 KG/M2 | OXYGEN SATURATION: 97 % | SYSTOLIC BLOOD PRESSURE: 138 MMHG | HEART RATE: 68 BPM | HEIGHT: 62 IN | WEIGHT: 181.88 LBS

## 2024-12-06 DIAGNOSIS — I10 PRIMARY HYPERTENSION: ICD-10-CM

## 2024-12-06 DIAGNOSIS — K21.9 GASTROESOPHAGEAL REFLUX DISEASE, UNSPECIFIED WHETHER ESOPHAGITIS PRESENT: ICD-10-CM

## 2024-12-06 PROCEDURE — 99999 PR PBB SHADOW E&M-EST. PATIENT-LVL V: CPT | Mod: PBBFAC,,, | Performed by: INTERNAL MEDICINE

## 2024-12-06 RX ORDER — METOPROLOL SUCCINATE 50 MG/1
50 TABLET, EXTENDED RELEASE ORAL DAILY
Qty: 90 TABLET | Refills: 3 | Status: SHIPPED | OUTPATIENT
Start: 2024-12-06

## 2024-12-06 RX ORDER — LISINOPRIL 40 MG/1
40 TABLET ORAL DAILY
Qty: 90 TABLET | Refills: 3 | Status: SHIPPED | OUTPATIENT
Start: 2024-12-06 | End: 2025-12-06

## 2024-12-06 RX ORDER — HYDRALAZINE HYDROCHLORIDE 25 MG/1
25 TABLET, FILM COATED ORAL EVERY 12 HOURS
Qty: 180 TABLET | Refills: 3 | Status: SHIPPED | OUTPATIENT
Start: 2024-12-06

## 2024-12-06 RX ORDER — PANTOPRAZOLE SODIUM 40 MG/1
40 TABLET, DELAYED RELEASE ORAL DAILY
Qty: 90 TABLET | Refills: 3 | Status: SHIPPED | OUTPATIENT
Start: 2024-12-06

## 2024-12-06 NOTE — PROGRESS NOTES
SUBJECTIVE     Chief Complaint   Patient presents with    Follow-up       HPI  Riley Marcus is a 69 y.o. female with multiple medical diagnoses as listed in the medical history and problem list that presents for evaluation for HTN. Pt has been doing  okay  since last visit, but she is still struggling with chronic back pain. she is fully compliant with meds and denies any adverse side effects. Pt is  somewhat compliant  with a low Na diet and does exercise by walking and stretching as often as possible. Pt does check her BP at home with readings 109-138/60-69. Pt presents for med refills today and is without any other complaints.     PAST MEDICAL HISTORY:  Past Medical History:   Diagnosis Date    Arthritis     Blood transfusion     Branch retinal vein occlusion of left eye     Cataract     Chronic midline low back pain with bilateral sciatica 09/08/2023    Corneal abrasion 20 yrs    ? eye due to cls     GERD (gastroesophageal reflux disease)     Hyperlipidemia     Hypertension     Lattice degeneration        PAST SURGICAL HISTORY:  Past Surgical History:   Procedure Laterality Date    BLOCK, NERVE, PERIPHERAL Bilateral 10/4/2024    Procedure: bilateral Greater Trochanteric Bursae Block without Steroid.;  Surgeon: Raj Pierre MD;  Location: [facility] PAIN MANAGEMENT;  Service: Pain Management;  Laterality: Bilateral;  20 mins ASA ok    Breast reduction      BREAST SURGERY      COLONOSCOPY N/A 07/20/2022    Procedure: COLONOSCOPY;  Surgeon: Chanda Hawkins MD;  Location: [facility];  Service: Endoscopy;  Laterality: N/A;    ESOPHAGOGASTRODUODENOSCOPY N/A 07/20/2022    Procedure: ESOPHAGOGASTRODUODENOSCOPY (EGD);  Surgeon: Chanda Hawkins MD;  Location: [facility];  Service: Endoscopy;  Laterality: N/A;    EYE SURGERY      HYSTERECTOMY      INJECTION Bilateral 01/18/2024    Procedure: INJECTION, BILATERAL PIRIFORMIS;  Surgeon: Raj Pierre MD;  Location: [facility] PAIN MGT;  Service: Pain Management;   Laterality: Bilateral;  [phone]  2 WK F/U ERIBERTO    INJECTION OF JOINT Bilateral 2024    Procedure: INJECTION, JOINT BILATERAL SI AND BILATERAL GTB;  Surgeon: Raj Pierre MD;  Location: [facility];  Service: Pain Management;  Laterality: Bilateral;  [phone]  2 WK F/U ERIBERTO    LASER LAPAROSCOPY      LASIK Bilateral     MINI-LAPAROTOMY      OOPHORECTOMY      TOTAL REDUCTION MAMMOPLASTY         SOCIAL HISTORY:  Social History     Socioeconomic History    Marital status:     Number of children: 0   Occupational History    Occupation: RN     Employer: [employer]   Tobacco Use    Smoking status: Never     Passive exposure: Never    Smokeless tobacco: Never   Substance and Sexual Activity    Alcohol use: Not Currently     Comment: rare glass of wine  about 2 to 3 times/ year    Drug use: No    Sexual activity: Not Currently     Partners: Male     Birth control/protection: Post-menopausal, See Surgical Hx, None   Social History Narrative      several years ago    She has not been sexually-active since    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 [facility] [place]     Social Drivers of Health     Financial Resource Strain: Low Risk  (3/11/2024)    Overall Financial Resource Strain (CARDIA)     Difficulty of Paying Living Expenses: Not hard at all   Food Insecurity: No Food Insecurity (3/11/2024)    Hunger Vital Sign     Worried About Running Out of Food in the Last Year: Never true     Ran Out of Food in the Last Year: Never true   Transportation Needs: No Transportation Needs (3/11/2024)    PRAPARE - Transportation     Lack of Transportation (Medical): No     Lack of Transportation (Non-Medical): No   Physical Activity: Sufficiently Active (3/11/2024)    Exercise Vital Sign     Days of Exercise per Week: 4 days     Minutes of Exercise per Session: 90 min   Stress: No Stress Concern Present (3/11/2024)    [employer] -  Occupational Stress Questionnaire     Feeling of Stress : Not at all   Housing Stability: Low Risk  (3/11/2024)    Housing Stability Vital Sign     Unable to Pay for Housing in the Last Year: No     Number of Places Lived in the Last Year: 1     Unstable Housing in the Last Year: No       FAMILY HISTORY:  Family History   Problem Relation Name Age of Onset    Arthritis Mother Misa M. mckinley     Heart disease Mother Misa M. mckinley     Hypertension Mother Misa M. mckinley     Cataracts Mother Misa M. mckinley     Heart disease Father Sagar K. Mckinley     Hypertension Father Sagar K. Mckinley     Stroke Father Sagar K. Mckinley     Diabetes Sister Kenia NAZARIO. Rivera     Hypertension Sister Kenia S. Rivera     Hypertension Sister Wendi Mckinley     Eczema Other niece     Arthritis Maternal Grandmother Luly Barr     Heart disease Maternal Grandmother Luly Barr     Hypertension Maternal Grandmother Luly Barr     Arthritis Maternal Grandfather Italo Barr     Arthritis Paternal Grandmother Hailey E. Mckinley     Heart disease Paternal Grandmother Hailey E. Mckinley     Stroke Paternal Grandmother Hailey E. Mckinley     Arthritis Paternal Grandfather Aashish Mckinley     No Known Problems Brother      No Known Problems Maternal Aunt      No Known Problems Maternal Uncle      No Known Problems Paternal Aunt      No Known Problems Paternal Uncle      Ovarian cancer Neg Hx      Breast cancer Neg Hx      Anxiety disorder Neg Hx      Depression Neg Hx      Suicide Neg Hx      Amblyopia Neg Hx      Blindness Neg Hx      Cancer Neg Hx      Glaucoma Neg Hx      Macular degeneration Neg Hx      Retinal detachment Neg Hx      Strabismus Neg Hx      Thyroid disease Neg Hx      Colon cancer Neg Hx      Esophageal cancer Neg Hx         ALLERGIES AND MEDICATIONS: updated and reviewed.  Review of patient's allergies indicates:   Allergen Reactions    Sulfa (sulfonamide antibiotics)  Itching and Rash     Other reaction(s): Rash     Current Outpatient Medications   Medication Sig Dispense Refill    acetaminophen (TYLENOL) 500 MG tablet Take 500 mg by mouth every 6 (six) hours as needed for Pain.      amLODIPine (NORVASC) 10 MG tablet Take 1 tablet (10 mg total) by mouth once daily. 90 tablet 3    apple cider vinegar 500 mg Tab       ascorbic acid, vitamin C, (VITAMIN C) 500 MG tablet Take 500 mg by mouth once daily.      aspirin 81 mg Tab Take by mouth. 1 Tablet Oral Every day      B-complex with vitamin C (Z-BEC OR EQUIV) tablet       biotin 1,000 mcg Chew       cetirizine (ZYRTEC) 10 MG tablet Take 10 mg by mouth once daily.      cholecalciferol, vitamin D3, (VITAMIN D3) 25 mcg (1,000 unit) capsule Take 1,000 Units by mouth once daily.      docusate sodium (COLACE) 100 MG capsule Take 100 mg by mouth once daily.      ezetimibe (ZETIA) 10 mg tablet Take 1 tablet (10 mg total) by mouth once daily. 90 tablet 3    ferrous sulfate (IRON) 325 mg (65 mg iron) Tab tablet       fluticasone propionate (FLONASE) 50 mcg/actuation nasal spray 2 sprays (100 mcg total) by Each Nostril route once daily. 16 g 12    green tea leaf extract 315 mg Cap Take by mouth. 1 Capsule Oral Every day      LIDOcaine (LIDODERM) 5 % Place 1 patch onto the skin once daily. Remove & Discard patch within 12 hours or as directed by MD 30 patch 2    magnesium 250 mg Tab       melatonin 1 mg Chew Take by mouth.      methocarbamoL (ROBAXIN) 500 MG Tab Take 1 tablet (500 mg total) by mouth 3 (three) times daily as needed. 90 tablet 2    omega-3 fatty acids 1,000 mg Cap Take by mouth. 1 Capsule Oral Every day      rutin/hesp/bioflav/[id number] (BIOFLEX ORAL) Take by mouth.      UNABLE TO FIND medication name: Tumeric 1000 mg daily      VITAMIN E,DL-ALPHA TOCOPHEROL, (VITAMIN E, BULK, MISC) by Misc.(Non-Drug; Combo Route) route.      hydrALAZINE (APRESOLINE) 25 MG tablet Take 1 tablet (25 mg total) by mouth every 12 (twelve) hours. 180  "tablet 3    lisinopriL (PRINIVIL,ZESTRIL) 40 MG tablet Take 1 tablet (40 mg total) by mouth once daily. 90 tablet 3    metoprolol succinate (TOPROL-XL) 50 MG 24 hr tablet Take 1 tablet (50 mg total) by mouth once daily. 90 tablet 3    pantoprazole (PROTONIX) 40 MG tablet Take 1 tablet (40 mg total) by mouth once daily. 90 tablet 3     No current facility-administered medications for this visit.       ROS  Review of Systems   Constitutional:  Negative for chills and fever.   HENT:  Negative for hearing loss and sore throat.    Eyes:  Negative for visual disturbance.   Respiratory:  Negative for cough and shortness of breath.    Cardiovascular:  Negative for chest pain, palpitations and leg swelling.   Gastrointestinal:  Positive for constipation. Negative for abdominal pain, diarrhea, nausea and vomiting.   Genitourinary:  Positive for frequency. Negative for dysuria and urgency.   Musculoskeletal:  Positive for back pain. Negative for arthralgias, joint swelling and myalgias.   Skin:  Negative for rash and wound.   Neurological:  Negative for headaches.   Psychiatric/Behavioral:  Negative for agitation and confusion. The patient is not nervous/anxious.          OBJECTIVE     Physical Exam  Vitals:    12/06/24 0946   BP: 138/68   Pulse:    Temp:     Body mass index is 33.27 kg/m².  Weight: 82.5 kg (181 lb 14.1 oz)   Height: 5' 2" (157.5 cm)     Physical Exam  Constitutional:       General: She is not in acute distress.     Appearance: She is well-developed.   HENT:      Head: Normocephalic and atraumatic.      Right Ear: External ear normal.      Left Ear: External ear normal.      Nose: Nose normal.   Eyes:      General: No scleral icterus.        Right eye: No discharge.         Left eye: No discharge.      Conjunctiva/sclera: Conjunctivae normal.   Neck:      Vascular: No JVD.      Trachea: No tracheal deviation.   Cardiovascular:      Rate and Rhythm: Normal rate and regular rhythm.      Heart sounds: No murmur " heard.     No friction rub. No gallop.   Pulmonary:      Effort: Pulmonary effort is normal. No respiratory distress.      Breath sounds: Normal breath sounds. No wheezing.   Abdominal:      General: Bowel sounds are normal. There is no distension.      Palpations: Abdomen is soft. There is no mass.      Tenderness: There is no abdominal tenderness. There is no guarding or rebound.   Musculoskeletal:         General: No tenderness or deformity. Normal range of motion.      Cervical back: Normal range of motion and neck supple.   Skin:     General: Skin is warm and dry.      Findings: No erythema or rash.   Neurological:      Mental Status: She is alert and oriented to person, place, and time.      Motor: No abnormal muscle tone.      Coordination: Coordination normal.   Psychiatric:         Behavior: Behavior normal.         Thought Content: Thought content normal.         Judgment: Judgment normal.           Health Maintenance         Date Due Completion Date    RSV Vaccine (Age 60+ and Pregnant patients) (1 - Risk 60-74 years 1-dose series) Never done ---    Mammogram 11/30/2024 11/30/2023    Hemoglobin A1c (Prediabetes) 05/10/2025 5/10/2024    Annual UACr 05/10/2025 5/10/2024    Lipid Panel 05/10/2025 5/10/2024    DEXA Scan 02/14/2027 2/14/2022    TETANUS VACCINE 11/30/2027 11/30/2017    Colorectal Cancer Screening 07/20/2032 7/20/2022              ASSESSMENT     69 y.o. female with     1. Primary hypertension    2. Gastroesophageal reflux disease, unspecified whether esophagitis present        PLAN:     1. Primary hypertension  - BP well controlled; at goal of <140/90 per home readings  - The current medical regimen is effective;  continue present plan and medications.  - metoprolol succinate (TOPROL-XL) 50 MG 24 hr tablet; Take 1 tablet (50 mg total) by mouth once daily.  Dispense: 90 tablet; Refill: 3  - lisinopriL (PRINIVIL,ZESTRIL) 40 MG tablet; Take 1 tablet (40 mg total) by mouth once daily.  Dispense: 90  tablet; Refill: 3  - hydrALAZINE (APRESOLINE) 25 MG tablet; Take 1 tablet (25 mg total) by mouth every 12 (twelve) hours.  Dispense: 180 tablet; Refill: 3    2. Gastroesophageal reflux disease, unspecified whether esophagitis present  - Stable; no acute issues  - The current medical regimen is effective;  continue present plan and medications.  - pantoprazole (PROTONIX) 40 MG tablet; Take 1 tablet (40 mg total) by mouth once daily.  Dispense: 90 tablet; Refill: 3          RTC in 6 months     Olga Duncan MD  12/06/2024 1:22 PM        No follow-ups on file.

## 2025-01-08 ENCOUNTER — TELEPHONE (OUTPATIENT)
Dept: FAMILY MEDICINE | Facility: CLINIC | Age: 70
End: 2025-01-08
Payer: MEDICARE

## 2025-01-15 ENCOUNTER — OFFICE VISIT (OUTPATIENT)
Dept: FAMILY MEDICINE | Facility: CLINIC | Age: 70
End: 2025-01-15
Payer: MEDICARE

## 2025-01-15 VITALS
SYSTOLIC BLOOD PRESSURE: 124 MMHG | DIASTOLIC BLOOD PRESSURE: 70 MMHG | OXYGEN SATURATION: 98 % | RESPIRATION RATE: 16 BRPM | HEIGHT: 62 IN | TEMPERATURE: 98 F | BODY MASS INDEX: 32.66 KG/M2 | HEART RATE: 64 BPM | WEIGHT: 177.5 LBS

## 2025-01-15 DIAGNOSIS — N18.32 STAGE 3B CHRONIC KIDNEY DISEASE: ICD-10-CM

## 2025-01-15 DIAGNOSIS — M25.551 LATERAL PAIN OF RIGHT HIP: Primary | ICD-10-CM

## 2025-01-15 DIAGNOSIS — G57.03 PIRIFORMIS SYNDROME OF BOTH SIDES: ICD-10-CM

## 2025-01-15 DIAGNOSIS — H34.8320 BRANCH RETINAL VEIN OCCLUSION WITH MACULAR EDEMA OF LEFT EYE: ICD-10-CM

## 2025-01-15 PROBLEM — E66.811 CLASS 1 OBESITY DUE TO EXCESS CALORIES WITH SERIOUS COMORBIDITY IN ADULT: Status: RESOLVED | Noted: 2023-05-08 | Resolved: 2025-01-15

## 2025-01-15 PROBLEM — N18.31 STAGE 3A CHRONIC KIDNEY DISEASE: Status: RESOLVED | Noted: 2021-12-15 | Resolved: 2025-01-15

## 2025-01-15 PROBLEM — E66.09 CLASS 1 OBESITY DUE TO EXCESS CALORIES WITH SERIOUS COMORBIDITY IN ADULT: Status: RESOLVED | Noted: 2023-05-08 | Resolved: 2025-01-15

## 2025-01-15 PROCEDURE — 99999 PR PBB SHADOW E&M-EST. PATIENT-LVL V: CPT | Mod: PBBFAC,,, | Performed by: NURSE PRACTITIONER

## 2025-01-15 PROCEDURE — 99214 OFFICE O/P EST MOD 30 MIN: CPT | Mod: S$GLB,,, | Performed by: NURSE PRACTITIONER

## 2025-01-15 PROCEDURE — 3078F DIAST BP <80 MM HG: CPT | Mod: CPTII,S$GLB,, | Performed by: NURSE PRACTITIONER

## 2025-01-15 PROCEDURE — 1160F RVW MEDS BY RX/DR IN RCRD: CPT | Mod: CPTII,S$GLB,, | Performed by: NURSE PRACTITIONER

## 2025-01-15 PROCEDURE — 1125F AMNT PAIN NOTED PAIN PRSNT: CPT | Mod: CPTII,S$GLB,, | Performed by: NURSE PRACTITIONER

## 2025-01-15 PROCEDURE — 3008F BODY MASS INDEX DOCD: CPT | Mod: CPTII,S$GLB,, | Performed by: NURSE PRACTITIONER

## 2025-01-15 PROCEDURE — 1159F MED LIST DOCD IN RCRD: CPT | Mod: CPTII,S$GLB,, | Performed by: NURSE PRACTITIONER

## 2025-01-15 PROCEDURE — 3074F SYST BP LT 130 MM HG: CPT | Mod: CPTII,S$GLB,, | Performed by: NURSE PRACTITIONER

## 2025-01-15 RX ORDER — METHYLPREDNISOLONE 4 MG/1
TABLET ORAL
Qty: 21 TABLET | Refills: 0 | Status: SHIPPED | OUTPATIENT
Start: 2025-01-15 | End: 2025-02-05

## 2025-01-22 NOTE — PROGRESS NOTES
"Subjective:      Patient ID: Riley Marcus is a 69 y.o. female.  New to me but seen previously in clinic by a fellow provider. Pt presents to clinic with acutely worsening chronic right hip pain, now radiating to right groin and anterior thigh. She is typically healthy though is unable to exercise due to worsening pain, mostly with hip flexion and direct pressure, is now unable to sleep on side. She reports long hx of bilateral piriformis pain with leg length discrepancy for which she has completed multiple rounds of PT and has been under the care of pain management though would like to discuss permanent solutions for pain relief.       Review of Systems   Constitutional:  Negative for activity change, appetite change, fatigue, fever, night sweats and unexpected weight change.   Eyes:  Negative for pain and visual disturbance.   Respiratory:  Negative for chest tightness and shortness of breath.    Cardiovascular:  Negative for chest pain, palpitations, leg swelling and claudication.   Gastrointestinal:  Negative for abdominal pain, change in bowel habit, constipation, diarrhea, nausea and vomiting.   Genitourinary:  Negative for difficulty urinating and dysuria.   Musculoskeletal:  Positive for arthralgias, back pain, gait problem, leg pain, myalgias and joint deformity. Negative for joint swelling, neck pain and neck stiffness.   Integumentary:  Negative for rash.   Neurological:  Negative for headaches.   Psychiatric/Behavioral: Negative.     All other systems reviewed and are negative.        Objective:     Vitals:    01/15/25 1250   BP: 124/70   Pulse: 64   Resp: 16   Temp: 97.8 °F (36.6 °C)   TempSrc: Oral   SpO2: 98%   Weight: 80.5 kg (177 lb 7.5 oz)   Height: 5' 2" (1.575 m)     Physical Exam  Vitals and nursing note reviewed.   Constitutional:       General: She is not in acute distress.     Appearance: Normal appearance. She is well-developed and well-groomed. She is not ill-appearing.   HENT:      " Head: Normocephalic and atraumatic.      Right Ear: External ear normal.      Left Ear: External ear normal.      Nose: Nose normal.      Mouth/Throat:      Lips: Pink.      Mouth: Mucous membranes are moist.   Eyes:      General: Lids are normal. Vision grossly intact. Gaze aligned appropriately.      Conjunctiva/sclera: Conjunctivae normal.      Pupils: Pupils are equal, round, and reactive to light.   Neck:      Trachea: Phonation normal.   Cardiovascular:      Rate and Rhythm: Normal rate and regular rhythm.      Heart sounds: Normal heart sounds.   Pulmonary:      Effort: Pulmonary effort is normal. No accessory muscle usage or respiratory distress.      Breath sounds: Normal breath sounds and air entry.   Abdominal:      General: Abdomen is flat. Bowel sounds are normal. There is no distension.      Palpations: Abdomen is soft.      Tenderness: There is no abdominal tenderness.   Musculoskeletal:      Cervical back: Neck supple.      Right hip: Tenderness present. No deformity, lacerations, bony tenderness or crepitus. Decreased range of motion. Normal strength.      Left hip: Normal. No deformity, lacerations, tenderness, bony tenderness or crepitus. Normal range of motion. Normal strength.      Right upper leg: Normal.      Left upper leg: Normal.      Right lower leg: Normal. No edema.      Left lower leg: Normal. No edema.        Legs:    Skin:     General: Skin is warm and dry.      Findings: No rash.   Neurological:      General: No focal deficit present.      Mental Status: She is alert and oriented to person, place, and time. Mental status is at baseline.      Sensory: Sensation is intact.      Motor: Motor function is intact.      Coordination: Coordination is intact.      Gait: Gait abnormal (antalgic limp).   Psychiatric:         Attention and Perception: Attention and perception normal.         Mood and Affect: Mood and affect normal.         Speech: Speech normal.         Behavior: Behavior  normal. Behavior is cooperative.         Thought Content: Thought content normal.         Cognition and Memory: Cognition and memory normal.         Judgment: Judgment normal.       Assessment and Plan:     1. Lateral pain of right hip (Primary)  Pt to f/u with ortho to discuss surgical options  Natural history and expected course discussed. Questions answered.  Proper lifting, bending technique discussed.  Stretching exercises discussed.  Regular aerobic and trunk strengthening exercises discussed.  Ice to affected area as needed for local pain relief.  Heat to affected area as needed for local pain relief.  OTC analgesics as needed.    - methylPREDNISolone (MEDROL DOSEPACK) 4 mg tablet; use as directed  Dispense: 21 tablet; Refill: 0  - Ambulatory referral/consult to Orthopedics; Future    2. Piriformis syndrome of both sides  Activity as tolerated, PRN analgesics with conservative management, f/u with ortho as scheduled   - methylPREDNISolone (MEDROL DOSEPACK) 4 mg tablet; use as directed  Dispense: 21 tablet; Refill: 0  - Ambulatory referral/consult to Orthopedics; Future    3. Stage 3b chronic kidney disease  BP controlled, on ACE-I, maintain adequate hydration and limit use of nephrotoxic agent    4. Branch retinal vein occlusion with macular edema of left eye  BP controlled and vision stable, continue opthalmology management            WILEY Palacios, FNP-C  Family/Internal Medicine  [facility]

## 2025-01-27 ENCOUNTER — OFFICE VISIT (OUTPATIENT)
Dept: ORTHOPEDICS | Facility: CLINIC | Age: 70
End: 2025-01-27
Payer: MEDICARE

## 2025-01-27 VITALS — WEIGHT: 177.5 LBS | BODY MASS INDEX: 32.66 KG/M2 | HEIGHT: 62 IN

## 2025-01-27 DIAGNOSIS — M25.551 LATERAL PAIN OF RIGHT HIP: ICD-10-CM

## 2025-01-27 DIAGNOSIS — M25.551 BILATERAL HIP PAIN: Primary | ICD-10-CM

## 2025-01-27 DIAGNOSIS — M25.552 BILATERAL HIP PAIN: Primary | ICD-10-CM

## 2025-01-27 DIAGNOSIS — G57.03 PIRIFORMIS SYNDROME OF BOTH SIDES: ICD-10-CM

## 2025-01-27 PROCEDURE — 1125F AMNT PAIN NOTED PAIN PRSNT: CPT | Mod: CPTII,S$GLB,, | Performed by: NURSE PRACTITIONER

## 2025-01-27 PROCEDURE — 99999 PR PBB SHADOW E&M-EST. PATIENT-LVL V: CPT | Mod: PBBFAC,,, | Performed by: NURSE PRACTITIONER

## 2025-01-27 PROCEDURE — 3288F FALL RISK ASSESSMENT DOCD: CPT | Mod: CPTII,S$GLB,, | Performed by: NURSE PRACTITIONER

## 2025-01-27 PROCEDURE — 1101F PT FALLS ASSESS-DOCD LE1/YR: CPT | Mod: CPTII,S$GLB,, | Performed by: NURSE PRACTITIONER

## 2025-01-27 PROCEDURE — 3008F BODY MASS INDEX DOCD: CPT | Mod: CPTII,S$GLB,, | Performed by: NURSE PRACTITIONER

## 2025-01-27 PROCEDURE — 99204 OFFICE O/P NEW MOD 45 MIN: CPT | Mod: S$GLB,,, | Performed by: NURSE PRACTITIONER

## 2025-01-27 PROCEDURE — 1160F RVW MEDS BY RX/DR IN RCRD: CPT | Mod: CPTII,S$GLB,, | Performed by: NURSE PRACTITIONER

## 2025-01-27 PROCEDURE — 1159F MED LIST DOCD IN RCRD: CPT | Mod: CPTII,S$GLB,, | Performed by: NURSE PRACTITIONER

## 2025-01-27 NOTE — PROGRESS NOTES
CC: Pain of the Left Hip and Pain of the Right Hip      HPI:   Pt with c/o bilateral hip pain right>left for the past year. The pain is aching and at times so severe she's unable to do her grocery shopping and cleaning. She has done physical therapy at the [place] location. She has had injections in the greater trochanter bursa three times by pain management. She has been taking ibuprofen which provides some relief, but she's not supposed to take it due to decreased renal function. She just completed a steroid dose pack which has helped some, but she can feel the pain coming back now that she's done. She's frustrated by the lack of relief and wants a more permanent relief. She is ambulating without assistive device. There is not a limp.      ROS  General: denies fever and chills  Resp: no c/o sob  CVS: no c/o cp  MSK: c/o bilateral lateral hip pain right>left    PE  General: AAOx3, pleasant and cooperative  Resp: respirations even and unlabored  MSK: bilateral hip exam  - [facility]  - straight leg raise  - pain with internal rotation  - pain with external rotation  + significant tenderness with mild palpation over the right GTB  Mild tenderness over the left GTB      Xray:  Reviewed xray of the hips done earlier this year. No significant arthritic changes    Assessment:  Bilateral trochanteric bursitis    Plan:  Will get MRI of both hips and refer for possible bursectomy. Pt understands the plan and agrees. I will f/u her MRI results with her by phone

## 2025-02-03 ENCOUNTER — HOSPITAL ENCOUNTER (OUTPATIENT)
Dept: RADIOLOGY | Facility: HOSPITAL | Age: 70
Discharge: HOME | End: 2025-02-03
Attending: NURSE PRACTITIONER
Payer: MEDICARE

## 2025-02-03 DIAGNOSIS — M25.551 BILATERAL HIP PAIN: ICD-10-CM

## 2025-02-03 DIAGNOSIS — M25.552 BILATERAL HIP PAIN: ICD-10-CM

## 2025-02-03 PROCEDURE — 73721 MRI JNT OF LWR EXTRE W/O DYE: CPT | Mod: TC,LT

## 2025-02-03 PROCEDURE — 73721 MRI JNT OF LWR EXTRE W/O DYE: CPT | Mod: 26,LT,, | Performed by: RADIOLOGY

## 2025-02-03 PROCEDURE — 73721 MRI JNT OF LWR EXTRE W/O DYE: CPT | Mod: 26,RT,, | Performed by: RADIOLOGY

## 2025-02-03 PROCEDURE — 73721 MRI JNT OF LWR EXTRE W/O DYE: CPT | Mod: TC,RT

## 2025-02-11 ENCOUNTER — OFFICE VISIT (OUTPATIENT)
Dept: FAMILY MEDICINE | Facility: CLINIC | Age: 70
End: 2025-02-11
Payer: MEDICARE

## 2025-02-11 VITALS
HEART RATE: 62 BPM | OXYGEN SATURATION: 99 % | BODY MASS INDEX: 33.23 KG/M2 | TEMPERATURE: 98 F | RESPIRATION RATE: 18 BRPM | DIASTOLIC BLOOD PRESSURE: 70 MMHG | WEIGHT: 180.56 LBS | SYSTOLIC BLOOD PRESSURE: 134 MMHG | HEIGHT: 62 IN

## 2025-02-11 DIAGNOSIS — Z00.00 ENCOUNTER FOR PREVENTIVE HEALTH EXAMINATION: Primary | ICD-10-CM

## 2025-02-11 DIAGNOSIS — N25.81 SECONDARY HYPERPARATHYROIDISM OF RENAL ORIGIN: ICD-10-CM

## 2025-02-11 DIAGNOSIS — I10 PRIMARY HYPERTENSION: ICD-10-CM

## 2025-02-11 DIAGNOSIS — N18.32 STAGE 3B CHRONIC KIDNEY DISEASE: ICD-10-CM

## 2025-02-11 DIAGNOSIS — E78.2 MIXED HYPERLIPIDEMIA: ICD-10-CM

## 2025-02-11 DIAGNOSIS — H34.8320 BRANCH RETINAL VEIN OCCLUSION WITH MACULAR EDEMA OF LEFT EYE: ICD-10-CM

## 2025-02-11 PROCEDURE — 99999 PR PBB SHADOW E&M-EST. PATIENT-LVL V: CPT | Mod: PBBFAC,,, | Performed by: NURSE PRACTITIONER

## 2025-02-11 NOTE — PATIENT INSTRUCTIONS
Counseling and Referral of Other Preventative  (Italic type indicates deductible and co-insurance are waived)    Patient Name: Riley Marcus  Today's Date: 2/11/2025    Health Maintenance       Date Due Completion Date    RSV Vaccine (Age 60+ and Pregnant patients) (1 - Risk 60-74 years 1-dose series) Never done ---    Hemoglobin A1c (Prediabetes) 05/10/2025 5/10/2024    Annual UACr 05/10/2025 5/10/2024    Lipid Panel 05/10/2025 5/10/2024    Mammogram 12/02/2025 12/2/2024    DEXA Scan 02/14/2027 2/14/2022    TETANUS VACCINE 11/30/2027 11/30/2017    Colorectal Cancer Screening 07/20/2032 7/20/2022        No orders of the defined types were placed in this encounter.    The following information is provided to all patients.  This information is to help you find resources for any of the problems found today that may be affecting your health:                  Living healthy guide: www.[facility].[place].gov      Understanding Diabetes: www.diabetes.org      Eating healthy: www.cdc.gov/healthyweight      [facility] home safety checklist: www.cdc.gov/steadi/patient.html      Agency on Aging: www.goea.[place].gov      Alcoholics anonymous (AA): www.aa.org      Physical Activity: www.mildred.nih.gov/em8ghof      Tobacco use: www.quitwithusla.org

## 2025-02-11 NOTE — PROGRESS NOTES
"Riley Marcus presented for a  Medicare AWV and comprehensive Health Risk Assessment today. The following components were reviewed and updated:    Medical history  Family History  Social history  Allergies and Current Medications  Health Risk Assessment  Health Maintenance  Care Team         ** See Completed Assessments for Annual Wellness Visit within the encounter summary.**         The following assessments were completed:  Living Situation  CAGE  Depression Screening  Timed Get Up and Go  Whisper Test  Cognitive Function Screening  Nutrition Screening  ADL Screening  PAQ Screening      Opioid documentation:      Patient does not have a current opioid prescription.        Vitals:    02/11/25 1402   BP: 134/70   BP Location: Right arm   Patient Position: Sitting   Pulse: 62   Resp: 18   Temp: 98.2 °F (36.8 °C)   TempSrc: Oral   SpO2: 99%   Weight: 81.9 kg (180 lb 8.9 oz)   Height: 5' 2" (1.575 m)     Body mass index is 33.02 kg/m².  Physical Exam  Vitals reviewed.   Constitutional:       General: She is not in acute distress.     Appearance: Normal appearance. She is not ill-appearing, toxic-appearing or diaphoretic.   HENT:      Head: Normocephalic and atraumatic.   Pulmonary:      Effort: Pulmonary effort is normal. No respiratory distress.      Breath sounds: No wheezing.   Skin:     General: Skin is warm and dry.   Neurological:      Mental Status: She is alert and oriented to person, place, and time.   Psychiatric:         Mood and Affect: Mood normal.         Behavior: Behavior normal.               Diagnoses and health risks identified today and associated recommendations/orders:    1. Encounter for preventive health examination  The patient was seen today for an annual Medicare wellness exam.  Health maintenance and screening topics were discussed.  Proper diet and exercise recommendations were reviewed.    2. Stage 3b chronic kidney disease  Recent GFR stable.  BP under good control.    3. Branch " retinal vein occlusion with macular edema of left eye  Vision stable.  BP under good control.    4. Primary hypertension  At goal on current medications.  Continue.    5. Mixed hyperlipidemia  On Zetia.  Continue.    6. Secondary hyperparathyroidism of renal origin  Recent PTH decreased from previous.  Recent calcium within normal limits.      Provided [facility] with a 5-10 year written screening schedule and personal prevention plan. Recommendations were developed using the USPSTF age appropriate recommendations. Education, counseling, and referrals were provided as needed. After Visit Summary printed and given to patient which includes a list of additional screenings\tests needed.    Follow up in about 1 year (around 2/11/2026) for AWV.    Bruce Jarquin, NP      I offered to discuss advanced care planning, including how to pick a person who would make decisions for you if you were unable to make them for yourself, called a health care power of , and what kind of decisions you might make such as use of life sustaining treatments such as ventilators and tube feeding when faced with a life limiting illness recorded on a living will that they will need to know. (How you want to be cared for as you near the end of your natural life)     X Patient is interested in learning more about how to make advanced directives.  I provided them paperwork and offered to discuss this with them.

## 2025-02-17 ENCOUNTER — LAB VISIT (OUTPATIENT)
Dept: LAB | Facility: HOSPITAL | Age: 70
End: 2025-02-17
Payer: MEDICARE

## 2025-02-17 DIAGNOSIS — M10.9 GOUT, UNSPECIFIED CAUSE, UNSPECIFIED CHRONICITY, UNSPECIFIED SITE: ICD-10-CM

## 2025-02-17 DIAGNOSIS — N18.32 STAGE 3B CHRONIC KIDNEY DISEASE: ICD-10-CM

## 2025-02-17 DIAGNOSIS — R80.1 PERSISTENT PROTEINURIA: ICD-10-CM

## 2025-02-17 DIAGNOSIS — I10 PRIMARY HYPERTENSION: ICD-10-CM

## 2025-02-17 DIAGNOSIS — E55.9 VITAMIN D DEFICIENCY: ICD-10-CM

## 2025-02-17 DIAGNOSIS — D50.9 IRON DEFICIENCY ANEMIA, UNSPECIFIED IRON DEFICIENCY ANEMIA TYPE: ICD-10-CM

## 2025-02-17 DIAGNOSIS — N25.81 SECONDARY HYPERPARATHYROIDISM OF RENAL ORIGIN: ICD-10-CM

## 2025-02-17 LAB
ALBUMIN SERPL BCP-MCNC: 3.8 G/DL (ref 3.5–5.2)
ALP SERPL-CCNC: 71 U/L (ref 40–150)
ALT SERPL W/O P-5'-P-CCNC: 15 U/L (ref 10–44)
ANION GAP SERPL CALC-SCNC: 11 MMOL/L (ref 8–16)
AST SERPL-CCNC: 20 U/L (ref 10–40)
BACTERIA #/AREA URNS HPF: ABNORMAL /HPF
BASOPHILS # BLD AUTO: 0.06 K/UL (ref 0–0.2)
BASOPHILS NFR BLD: 0.9 % (ref 0–1.9)
BILIRUB SERPL-MCNC: 0.5 MG/DL (ref 0.1–1)
BILIRUB UR QL STRIP: NEGATIVE
BUN SERPL-MCNC: 24 MG/DL (ref 8–23)
CALCIUM SERPL-MCNC: 10.3 MG/DL (ref 8.7–10.5)
CHLORIDE SERPL-SCNC: 102 MMOL/L (ref 95–110)
CLARITY UR: CLEAR
CO2 SERPL-SCNC: 28 MMOL/L (ref 23–29)
COLOR UR: YELLOW
CREAT SERPL-MCNC: 1.6 MG/DL (ref 0.5–1.4)
CREAT UR-MCNC: 217 MG/DL (ref 15–325)
DIFFERENTIAL METHOD BLD: NORMAL
EOSINOPHIL # BLD AUTO: 0.2 K/UL (ref 0–0.5)
EOSINOPHIL NFR BLD: 2.5 % (ref 0–8)
ERYTHROCYTE [DISTWIDTH] IN BLOOD BY AUTOMATED COUNT: 12.8 % (ref 11.5–14.5)
EST. GFR  (NO RACE VARIABLE): 35 ML/MIN/1.73 M^2
GLUCOSE SERPL-MCNC: 99 MG/DL (ref 70–110)
GLUCOSE UR QL STRIP: NEGATIVE
HCT VFR BLD AUTO: 39.2 % (ref 37–48.5)
HGB BLD-MCNC: 12.7 G/DL (ref 12–16)
HGB UR QL STRIP: NEGATIVE
HYALINE CASTS #/AREA URNS LPF: 4 /LPF
IMM GRANULOCYTES # BLD AUTO: 0.02 K/UL (ref 0–0.04)
IMM GRANULOCYTES NFR BLD AUTO: 0.3 % (ref 0–0.5)
KETONES UR QL STRIP: NEGATIVE
LEUKOCYTE ESTERASE UR QL STRIP: NEGATIVE
LYMPHOCYTES # BLD AUTO: 1.4 K/UL (ref 1–4.8)
LYMPHOCYTES NFR BLD: 21.5 % (ref 18–48)
MAGNESIUM SERPL-MCNC: 2.3 MG/DL (ref 1.6–2.6)
MCH RBC QN AUTO: 30.5 PG (ref 27–31)
MCHC RBC AUTO-ENTMCNC: 32.4 G/DL (ref 32–36)
MCV RBC AUTO: 94 FL (ref 82–98)
MICROSCOPIC COMMENT: ABNORMAL
MONOCYTES # BLD AUTO: 0.7 K/UL (ref 0.3–1)
MONOCYTES NFR BLD: 10.6 % (ref 4–15)
NEUTROPHILS # BLD AUTO: 4.1 K/UL (ref 1.8–7.7)
NEUTROPHILS NFR BLD: 64.2 % (ref 38–73)
NITRITE UR QL STRIP: NEGATIVE
NRBC BLD-RTO: 0 /100 WBC
PH UR STRIP: 6 [PH] (ref 5–8)
PHOSPHATE SERPL-MCNC: 3.3 MG/DL (ref 2.7–4.5)
PLATELET # BLD AUTO: 252 K/UL (ref 150–450)
PMV BLD AUTO: 11.1 FL (ref 9.2–12.9)
POTASSIUM SERPL-SCNC: 3.8 MMOL/L (ref 3.5–5.1)
PROT SERPL-MCNC: 8.5 G/DL (ref 6–8.4)
PROT UR QL STRIP: ABNORMAL
PROT UR-MCNC: 116 MG/DL
PROT/CREAT UR: 0.53 MG/G{CREAT} (ref 0–0.2)
PTH-INTACT SERPL-MCNC: 155.5 PG/ML (ref 9–77)
RBC # BLD AUTO: 4.17 M/UL (ref 4–5.4)
RBC #/AREA URNS HPF: 0 /HPF (ref 0–4)
SODIUM SERPL-SCNC: 141 MMOL/L (ref 136–145)
SP GR UR STRIP: 1.02 (ref 1–1.03)
SQUAMOUS #/AREA URNS HPF: 0 /HPF
URATE SERPL-MCNC: 7.3 MG/DL (ref 2.4–5.7)
URN SPEC COLLECT METH UR: ABNORMAL
UROBILINOGEN UR STRIP-ACNC: NEGATIVE EU/DL
WBC # BLD AUTO: 6.41 K/UL (ref 3.9–12.7)
WBC #/AREA URNS HPF: 0 /HPF (ref 0–5)

## 2025-02-17 PROCEDURE — 85025 COMPLETE CBC W/AUTO DIFF WBC: CPT

## 2025-02-17 PROCEDURE — 83735 ASSAY OF MAGNESIUM: CPT

## 2025-02-17 PROCEDURE — 84100 ASSAY OF PHOSPHORUS: CPT

## 2025-02-17 PROCEDURE — 82306 VITAMIN D 25 HYDROXY: CPT

## 2025-02-17 PROCEDURE — 80053 COMPREHEN METABOLIC PANEL: CPT

## 2025-02-17 PROCEDURE — 84550 ASSAY OF BLOOD/URIC ACID: CPT

## 2025-02-17 PROCEDURE — 81000 URINALYSIS NONAUTO W/SCOPE: CPT

## 2025-02-17 PROCEDURE — 83970 ASSAY OF PARATHORMONE: CPT

## 2025-02-17 PROCEDURE — 36415 COLL VENOUS BLD VENIPUNCTURE: CPT

## 2025-02-17 PROCEDURE — 84156 ASSAY OF PROTEIN URINE: CPT

## 2025-02-18 LAB — 25(OH)D3+25(OH)D2 SERPL-MCNC: 53 NG/ML (ref 30–96)

## 2025-03-12 DIAGNOSIS — M53.3 SACROILIAC JOINT PAIN: Primary | ICD-10-CM

## 2025-03-12 DIAGNOSIS — M70.60 GREATER TROCHANTERIC BURSITIS, UNSPECIFIED LATERALITY: ICD-10-CM

## 2025-03-12 RX ORDER — LIDOCAINE 50 MG/G
1 PATCH TOPICAL DAILY
Qty: 30 PATCH | Refills: 2 | Status: SHIPPED | OUTPATIENT
Start: 2025-03-12

## 2025-04-04 ENCOUNTER — OFFICE VISIT (OUTPATIENT)
Dept: FAMILY MEDICINE | Facility: CLINIC | Age: 70
End: 2025-04-04
Payer: MEDICARE

## 2025-04-04 ENCOUNTER — PATIENT MESSAGE (OUTPATIENT)
Dept: FAMILY MEDICINE | Facility: CLINIC | Age: 70
End: 2025-04-04

## 2025-04-04 VITALS
WEIGHT: 176.38 LBS | TEMPERATURE: 98 F | OXYGEN SATURATION: 98 % | HEIGHT: 62 IN | SYSTOLIC BLOOD PRESSURE: 146 MMHG | HEART RATE: 64 BPM | BODY MASS INDEX: 32.46 KG/M2 | DIASTOLIC BLOOD PRESSURE: 80 MMHG

## 2025-04-04 VITALS — SYSTOLIC BLOOD PRESSURE: 122 MMHG | DIASTOLIC BLOOD PRESSURE: 67 MMHG

## 2025-04-04 DIAGNOSIS — N18.32 STAGE 3B CHRONIC KIDNEY DISEASE: ICD-10-CM

## 2025-04-04 DIAGNOSIS — J40 SINOBRONCHITIS: Primary | ICD-10-CM

## 2025-04-04 DIAGNOSIS — J32.9 SINOBRONCHITIS: Primary | ICD-10-CM

## 2025-04-04 DIAGNOSIS — I10 PRIMARY HYPERTENSION: ICD-10-CM

## 2025-04-04 PROCEDURE — 99999 PR PBB SHADOW E&M-EST. PATIENT-LVL V: CPT | Mod: PBBFAC,,, | Performed by: NURSE PRACTITIONER

## 2025-04-04 RX ORDER — METHYLPREDNISOLONE 4 MG/1
TABLET ORAL
Qty: 21 EACH | Refills: 0 | Status: SHIPPED | OUTPATIENT
Start: 2025-04-04 | End: 2025-04-25

## 2025-04-04 RX ORDER — DOXYCYCLINE 100 MG/1
100 CAPSULE ORAL EVERY 12 HOURS
Qty: 14 CAPSULE | Refills: 0 | Status: SHIPPED | OUTPATIENT
Start: 2025-04-04 | End: 2025-04-11

## 2025-04-04 RX ORDER — PROMETHAZINE HYDROCHLORIDE AND DEXTROMETHORPHAN HYDROBROMIDE 6.25; 15 MG/5ML; MG/5ML
5 SYRUP ORAL EVERY 6 HOURS PRN
Qty: 120 ML | Refills: 0 | Status: SHIPPED | OUTPATIENT
Start: 2025-04-04 | End: 2025-04-14

## 2025-04-04 NOTE — PROGRESS NOTES
Subjective:       Patient ID: Riley Marcus is a 70 y.o. female.    Chief Complaint: Cough and Nasal Congestion    HPI     Riley Marcus is a 70 y.o. female patient that presents to clinic with a chief complaint of cough and nasal congestion. Past medical and surgical history reviewed as listed. PCP is Olga Duncan MD , she is New to me.     Cough  This is a recurrent (about a month now) problem. The current episode started 1 to 4 weeks ago. The problem has been gradually improving. The problem occurs every few hours. The cough is Productive of purulent sputum (green/grey sputum). Associated symptoms include headaches, nasal congestion and a sore throat. Pertinent negatives include no chest pain, chills, ear congestion, ear pain, postnasal drip, shortness of breath or wheezing. The symptoms are aggravated by pollens and lying down. She has tried OTC cough suppressant (Tylenol sinus) for the symptoms. The treatment provided mild relief. Her past medical history is significant for environmental allergies.   Hypertension  This is a chronic (BP is likely elevated today because patient has been taking OTC meds w/ phenylephrine) problem. The problem is controlled. Associated symptoms include headaches. Pertinent negatives include no chest pain or shortness of breath. Past treatments include calcium channel blockers and ACE inhibitors. The current treatment provides significant improvement. There are no compliance problems.      ROS as listed.     Past Medical History:   Diagnosis Date    Arthritis     Blood transfusion     Branch retinal vein occlusion of left eye     Cataract     Chronic midline low back pain with bilateral sciatica 09/08/2023    Corneal abrasion 20 yrs    ? eye due to cls     GERD (gastroesophageal reflux disease)     Hyperlipidemia     Hypertension     Lattice degeneration     Osteoarthritis     Seasonal allergies Feb 2024      Past Surgical History:   Procedure Laterality Date    BLOCK,  NERVE, PERIPHERAL Bilateral 10/4/2024    Procedure: bilateral Greater Trochanteric Bursae Block without Steroid.;  Surgeon: Raj Pierre MD;  Location: [facility]IN [facility];  Service: Pain Management;  Laterality: Bilateral;  20 mins ASA ok    Breast reduction      BREAST SURGERY      COLONOSCOPY N/A 07/20/2022    Procedure: COLONOSCOPY;  Surgeon: Chanda Hawkins MD;  Location: [facility] ENDO;  Service: Endoscopy;  Laterality: N/A;    ESOPHAGOGASTRODUODENOSCOPY N/A 07/20/2022    Procedure: ESOPHAGOGASTRODUODENOSCOPY (EGD);  Surgeon: Chanda Hawkins MD;  Location: [facility] ENDO;  Service: Endoscopy;  Laterality: N/A;    EYE SURGERY      HYSTERECTOMY      INJECTION Bilateral 01/18/2024    Procedure: INJECTION, BILATERAL PIRIFORMIS;  Surgeon: Raj Pierre MD;  Location: [facility] PAIN MGT;  Service: Pain Management;  Laterality: Bilateral;  [phone]  2 WK F/U ERIBERTO    INJECTION OF JOINT Bilateral 03/07/2024    Procedure: INJECTION, JOINT BILATERAL SI AND BILATERAL GTB;  Surgeon: Raj Pierre MD;  Location: [facility] PAIN MGT;  Service: Pain Management;  Laterality: Bilateral;  [phone]  2 WK F/U ERIBERTO    LASER LAPAROSCOPY  1988    LASIK Bilateral     MINI-LAPAROTOMY  1988    OOPHORECTOMY      TOTAL REDUCTION MAMMOPLASTY        Family History   Problem Relation Name Age of Onset    Arthritis Mother Misa M. mckinley     Heart disease Mother Misa M. mckinley     Hypertension Mother Misa M. mckinley     Cataracts Mother Misa M. mckinley     Heart disease Father Sagar K. Mckinley     Hypertension Father Sagar K. Mckinley     Stroke Father Sagar K. Mckinley     Diabetes Sister Kenia Rivera     Hypertension Sister Kenia Rivera     Hypertension Sister Wendi Mckinley     Eczema Other niece     Arthritis Maternal Grandmother Luly Barr     Heart disease Maternal Grandmother Luly Barr     Hypertension Maternal Grandmother Luly Barr     Arthritis Maternal Grandfather Italo  "Barr     Arthritis Paternal Grandmother Hailey DAILEY. Allen     Heart disease Paternal Grandmother Hailey DAILEY. Allen     Stroke Paternal Grandmother Hailey DAILEY. Allen     Arthritis Paternal Grandfather Aashish Allen     No Known Problems Brother      No Known Problems Maternal Aunt      No Known Problems Maternal Uncle      No Known Problems Paternal Aunt      No Known Problems Paternal Uncle      Ovarian cancer Neg Hx      Breast cancer Neg Hx      Anxiety disorder Neg Hx      Depression Neg Hx      Suicide Neg Hx      Amblyopia Neg Hx      Blindness Neg Hx      Cancer Neg Hx      Glaucoma Neg Hx      Macular degeneration Neg Hx      Retinal detachment Neg Hx      Strabismus Neg Hx      Thyroid disease Neg Hx      Colon cancer Neg Hx      Esophageal cancer Neg Hx        Review of patient's allergies indicates:   Allergen Reactions    Sulfa (sulfonamide antibiotics) Itching and Rash     Other reaction(s): Rash     Review of Systems   Constitutional:  Positive for fatigue. Negative for chills.   HENT:  Positive for sinus pressure, sinus pain, sore throat and voice change. Negative for ear pain and postnasal drip.    Respiratory:  Positive for cough. Negative for shortness of breath and wheezing.    Cardiovascular:  Negative for chest pain.   Allergic/Immunologic: Positive for environmental allergies.   Neurological:  Positive for headaches.       Objective:      Vitals:    04/04/25 1024 04/04/25 1100   BP: (!) 162/80 (!) 146/80   BP Location:  Left arm   Patient Position:  Sitting   Pulse: 64    Temp: 98.4 °F (36.9 °C)    TempSrc: Oral    SpO2: 98%    Weight: 80 kg (176 lb 5.9 oz)    Height: 5' 2" (1.575 m)       Physical Exam  Vitals and nursing note reviewed.   Constitutional:       General: She is not in acute distress.  HENT:      Head: Normocephalic.      Nose:      Right Sinus: Maxillary sinus tenderness and frontal sinus tenderness present.      Left Sinus: Maxillary sinus tenderness and frontal " sinus tenderness present.      Mouth/Throat:      Lips: Pink.      Pharynx: Uvula midline. Posterior oropharyngeal erythema and postnasal drip present.   Eyes:      Conjunctiva/sclera: Conjunctivae normal.      Pupils: Pupils are equal, round, and reactive to light.   Cardiovascular:      Rate and Rhythm: Normal rate and regular rhythm.   Pulmonary:      Effort: Pulmonary effort is normal. No respiratory distress.      Breath sounds: Normal breath sounds.   Musculoskeletal:      Cervical back: Normal range of motion and neck supple.   Lymphadenopathy:      Cervical: No cervical adenopathy.   Skin:     General: Skin is warm and dry.   Neurological:      Mental Status: She is alert and oriented to person, place, and time.      Gait: Gait normal.   Psychiatric:         Mood and Affect: Mood normal.         Behavior: Behavior normal.       Lab Results   Component Value Date    WBC 6.41 02/17/2025    HGB 12.7 02/17/2025    HCT 39.2 02/17/2025     02/17/2025    CHOL 207 (H) 05/10/2024    TRIG 124 05/10/2024    HDL 52 05/10/2024    ALT 15 02/17/2025    AST 20 02/17/2025     02/17/2025    K 3.8 02/17/2025     02/17/2025    CREATININE 1.6 (H) 02/17/2025    BUN 24 (H) 02/17/2025    CO2 28 02/17/2025    TSH 1.395 05/10/2024    TSH 1.395 05/10/2024    INR 1.0 04/12/2017    HGBA1C 5.9 (H) 05/10/2024      Assessment:       1. Sinobronchitis    2. Primary hypertension        Plan:       Sinobronchitis  Supportive care of symptoms.   Avoid OTC medications with decongestants. Use Coricidin HBP.   -     doxycycline (VIBRAMYCIN) 100 MG Cap; Take 1 capsule (100 mg total) by mouth every 12 (twelve) hours. for 7 days  Dispense: 14 capsule; Refill: 0  -     promethazine-dextromethorphan (PROMETHAZINE-DM) 6.25-15 mg/5 mL Syrp; Take 5 mLs by mouth every 6 (six) hours as needed (cough).  Dispense: 120 mL; Refill: 0  -     methylPREDNISolone (MEDROL DOSEPACK) 4 mg tablet; use as directed  Dispense: 21 each; Refill:  0    Primary hypertension  BP elevated.   Avoid OTC medications with decongestants. Use Coricidin HBP.   Continue amlodipine 10 mg po daily, hydralazine, lisinopril.  Low sodium diet.     Medication List with Changes/Refills   New Medications    DOXYCYCLINE (VIBRAMYCIN) 100 MG CAP    Take 1 capsule (100 mg total) by mouth every 12 (twelve) hours. for 7 days    METHYLPREDNISOLONE (MEDROL DOSEPACK) 4 MG TABLET    use as directed    PROMETHAZINE-DEXTROMETHORPHAN (PROMETHAZINE-DM) 6.25-15 MG/5 ML SYRP    Take 5 mLs by mouth every 6 (six) hours as needed (cough).   Current Medications    ACETAMINOPHEN (TYLENOL) 500 MG TABLET    Take 500 mg by mouth every 6 (six) hours as needed for Pain.    AMLODIPINE (NORVASC) 10 MG TABLET    Take 1 tablet (10 mg total) by mouth once daily.    APPLE CIDER VINEGAR 500 MG TAB        ASCORBIC ACID, VITAMIN C, (VITAMIN C) 500 MG TABLET    Take 500 mg by mouth once daily.    ASPIRIN 81 MG TAB    Take by mouth. 1 Tablet Oral Every day    B-COMPLEX WITH VITAMIN C (Z-BEC OR EQUIV) TABLET        BIOTIN 1,000 MCG CHEW        CETIRIZINE (ZYRTEC) 10 MG TABLET    Take 10 mg by mouth once daily.    CHOLECALCIFEROL, VITAMIN D3, (VITAMIN D3) 25 MCG (1,000 UNIT) CAPSULE    Take 1,000 Units by mouth once daily.    DOCUSATE SODIUM (COLACE) 100 MG CAPSULE    Take 100 mg by mouth once daily.    EZETIMIBE (ZETIA) 10 MG TABLET    Take 1 tablet (10 mg total) by mouth once daily.    GREEN TEA LEAF EXTRACT 315 MG CAP    Take by mouth. 1 Capsule Oral Every day    HYDRALAZINE (APRESOLINE) 25 MG TABLET    Take 1 tablet (25 mg total) by mouth every 12 (twelve) hours.    LIDOCAINE (LIDODERM) 5 %    Place 1 patch onto the skin once daily. Remove & Discard patch within 12 hours or as directed by MD    LISINOPRIL (PRINIVIL,ZESTRIL) 40 MG TABLET    Take 1 tablet (40 mg total) by mouth once daily.    MAGNESIUM 250 MG TAB        MELATONIN 1 MG CHEW    Take by mouth.    METHOCARBAMOL (ROBAXIN) 500 MG TAB    Take 1 tablet  (500 mg total) by mouth 3 (three) times daily as needed.    METOPROLOL SUCCINATE (TOPROL-XL) 50 MG 24 HR TABLET    Take 1 tablet (50 mg total) by mouth once daily.    OMEGA-3 FATTY ACIDS 1,000 MG CAP    Take by mouth. 1 Capsule Oral Every day    PANTOPRAZOLE (PROTONIX) 40 MG TABLET    Take 1 tablet (40 mg total) by mouth once daily.    RUTIN/HESP/BIOFLAV/C/[id number] (BIOFLEX ORAL)    Take by mouth.    UNABLE TO FIND    medication name: Tumeric 1000 mg daily    VITAMIN E,DL-ALPHA TOCOPHEROL, (VITAMIN E, BULK, MISC)    by Misc.(Non-Drug; Combo Route) route.   Discontinued Medications    FERROUS SULFATE (IRON) 325 MG (65 MG IRON) TAB TABLET             Follow up if symptoms worsen or fail to improve.         Evon Carlin, DNP, APRN, FNP-C  Family Medicine  [facility]  04/04/2025 10:34 AM

## 2025-04-08 ENCOUNTER — PATIENT MESSAGE (OUTPATIENT)
Dept: OTOLARYNGOLOGY | Facility: CLINIC | Age: 70
End: 2025-04-08
Payer: MEDICARE

## 2025-05-05 ENCOUNTER — OFFICE VISIT (OUTPATIENT)
Dept: PAIN MEDICINE | Facility: CLINIC | Age: 70
End: 2025-05-05
Payer: MEDICARE

## 2025-05-05 VITALS
DIASTOLIC BLOOD PRESSURE: 91 MMHG | BODY MASS INDEX: 32.82 KG/M2 | SYSTOLIC BLOOD PRESSURE: 174 MMHG | HEIGHT: 62 IN | WEIGHT: 178.38 LBS | HEART RATE: 75 BPM

## 2025-05-05 DIAGNOSIS — M16.0 BILATERAL PRIMARY OSTEOARTHRITIS OF HIP: Primary | ICD-10-CM

## 2025-05-05 DIAGNOSIS — M70.60 GREATER TROCHANTERIC BURSITIS, UNSPECIFIED LATERALITY: ICD-10-CM

## 2025-05-05 PROCEDURE — 1160F RVW MEDS BY RX/DR IN RCRD: CPT | Mod: CPTII,S$GLB,, | Performed by: STUDENT IN AN ORGANIZED HEALTH CARE EDUCATION/TRAINING PROGRAM

## 2025-05-05 PROCEDURE — 99999 PR PBB SHADOW E&M-EST. PATIENT-LVL IV: CPT | Mod: PBBFAC,,, | Performed by: STUDENT IN AN ORGANIZED HEALTH CARE EDUCATION/TRAINING PROGRAM

## 2025-05-05 PROCEDURE — 1125F AMNT PAIN NOTED PAIN PRSNT: CPT | Mod: CPTII,S$GLB,, | Performed by: STUDENT IN AN ORGANIZED HEALTH CARE EDUCATION/TRAINING PROGRAM

## 2025-05-05 PROCEDURE — 3288F FALL RISK ASSESSMENT DOCD: CPT | Mod: CPTII,S$GLB,, | Performed by: STUDENT IN AN ORGANIZED HEALTH CARE EDUCATION/TRAINING PROGRAM

## 2025-05-05 PROCEDURE — G2211 COMPLEX E/M VISIT ADD ON: HCPCS | Mod: S$GLB,,, | Performed by: STUDENT IN AN ORGANIZED HEALTH CARE EDUCATION/TRAINING PROGRAM

## 2025-05-05 PROCEDURE — 99214 OFFICE O/P EST MOD 30 MIN: CPT | Mod: S$GLB,,, | Performed by: STUDENT IN AN ORGANIZED HEALTH CARE EDUCATION/TRAINING PROGRAM

## 2025-05-05 PROCEDURE — 4010F ACE/ARB THERAPY RXD/TAKEN: CPT | Mod: CPTII,S$GLB,, | Performed by: STUDENT IN AN ORGANIZED HEALTH CARE EDUCATION/TRAINING PROGRAM

## 2025-05-05 PROCEDURE — 3008F BODY MASS INDEX DOCD: CPT | Mod: CPTII,S$GLB,, | Performed by: STUDENT IN AN ORGANIZED HEALTH CARE EDUCATION/TRAINING PROGRAM

## 2025-05-05 PROCEDURE — 3080F DIAST BP >= 90 MM HG: CPT | Mod: CPTII,S$GLB,, | Performed by: STUDENT IN AN ORGANIZED HEALTH CARE EDUCATION/TRAINING PROGRAM

## 2025-05-05 PROCEDURE — 3077F SYST BP >= 140 MM HG: CPT | Mod: CPTII,S$GLB,, | Performed by: STUDENT IN AN ORGANIZED HEALTH CARE EDUCATION/TRAINING PROGRAM

## 2025-05-05 PROCEDURE — 3066F NEPHROPATHY DOC TX: CPT | Mod: CPTII,S$GLB,, | Performed by: STUDENT IN AN ORGANIZED HEALTH CARE EDUCATION/TRAINING PROGRAM

## 2025-05-05 PROCEDURE — 1101F PT FALLS ASSESS-DOCD LE1/YR: CPT | Mod: CPTII,S$GLB,, | Performed by: STUDENT IN AN ORGANIZED HEALTH CARE EDUCATION/TRAINING PROGRAM

## 2025-05-05 PROCEDURE — 1159F MED LIST DOCD IN RCRD: CPT | Mod: CPTII,S$GLB,, | Performed by: STUDENT IN AN ORGANIZED HEALTH CARE EDUCATION/TRAINING PROGRAM

## 2025-05-05 NOTE — PROGRESS NOTES
Chronic patient Established Note (Follow up visit)      SUBJECTIVE:  INTERVAL HISTORY 5/5/2025:  Ms. Marcus returns for bilateral hip (right > left). Current Pain level is 9/10.  She reports the pain bothers her when she sits/stands.  She also reports the pain wakes her up at night. Since her last visit, she saw orthopedic surgery and had MRIs of her hips completed.  She reports that they offered her no intervention based off her MRI. She does feel the pain radiates into her right > left groin sometimes.    INTERVAL HISTORY 9/23/2024:  Riley Marcus is a 70 y.o. who presents to the clinic for follow up evaluation and management of low back and BL hip pain in the setting of SIJ dysfunction, lumbar radiculopathy, greater trochanteric pain syndrome, and myofascial pain syndrome.. Last seen in clinic on 3/21/2024 at which time we discussed formal PT referral for persistent pain complaints. Today, pt persents with primary complaint of BL buttock pain and lateral hip pain. Symptoms localized over ischium bilaterally and greater troch bilaterally. Pain is worse with prolonged walking and sitting  . Current pain 7-8/10.  Ibuprofen, naproxen, somewhat helpful--pt advised to discontinue NSAIDs d/t CKD.. Methocarbamol without much benefit. Tylenol 500 1-2 times daily. Also using topical lidocaine and diclofenac with benefit.    INTERVAL HISTORY 3/21/2024:  Riley Marcus presents for follow-up s/p Bilateral SIJ and Bilateral GTB on 3/7/2024.  She states this has provided 70% relief to her SIJ and 0% to the GTB. She continues robaxin 500 mg TID PRN and Lidocaine patches as needed with  moderate relief.  She denies any perceived side effects.   The patient denies fever/night sweats, urinary incontinence, bowel incontinence, significant weight changes, significant motor weakness or changes, or loss of sensations.  Her pain today is 7/10    INTERVAL HISTORY 2/26/2024:  Riley Marcus presents to the clinic for a follow-up  appointment for chronic pain. Current pain intensity is 8/10.  Since the last visit, Riley Marcus states:  after her piriformis injection, she had 100% pain relief which lasted 3 weeks.  She however states that the pain has returned, and is worse in the morning.  It tends to feel better as the day progresses.  She continues to go to the gym 3 times a week and actively participates in a home exercise program to help her pain.      PRIOR HISTORY:   Riley Marcus presents to the clinic for the evaluation of lower back and bilateral buttocks pain. The pain started many years ago following no inciting event and symptoms have been worsening.The pain is located in the lower back area and radiates to the bilateral buttocks.  The pain is described as sharp and is rated as 8/10. The pain is rated with a score of  5/10 on the BEST day and a score of 8/10 on the WORST day.  Symptoms interfere with daily activity. The pain is exacerbated by walking, kneeling (putting close together), standing.  The pain is mitigated by moving legs far apart. The patient reports 7-8 hours of uninterrupted sleep per night.    Patient denies urinary incontinence, bowel incontinence, and significant motor weakness. She had a caudal MELL in 2015 which she didn't find beneficial    Physical Therapy/Home Exercise: yes, completed in October 2023. Continuing to do home exercise program.      Pain Disability Index Review:      5/5/2025     8:44 AM 9/23/2024    10:19 AM 3/21/2024     1:27 PM   Last 3 PDI Scores   Pain Disability Index (PDI) 56 49 32         Pain Medications:   - tylenol extended release   - ibuprofen   - naproxen     report:  Reviewed and consistent with medication use as prescribed.  03/11/2022 03/11/2022 1 Oxycodone-Acetaminophn 7.5-325 16.00 4 [facility]       Pain Procedures:   11/23/2015: Caudal MELL ([place])  1/18/2024: Bilateral piriformis 100% for 3 weeks (still resolution of sciatica pain)  3/7/2024 - Bilateral SIJ and  Bilateral GTB - 70% in the SIJ 0% in GTB relief  10/4/2024 - Bilateral GTB block    Imaging:   MRI HIP WITHOUT CONTRAST LEFT     CLINICAL HISTORY:  Hip pain, chronic, tendon/ligament abnormality suspected, xray done;  Pain in right hip     FINDINGS:  No fracture dislocation bone destruction seen.  There is no evidence of AVN or transient osteopenia.  There is no significant joint fluid.  No adenopathy is seen.  Intrapelvic contents are unremarkable.  There is mild DJD.  There is a possible chronic labral tear.  No definite impingement change seen.     Impression:     Mild DJD and possible chronic labral tear.        Electronically signed by:Grey Ochoa MD  Date:                                            02/03/2025  Time:                                           08:35    MRI HIP WITHOUT CONTRAST RIGHT     CLINICAL HISTORY:  Hip pain, chronic, tendon/ligament abnormality suspected, xray done;  Pain in right hip     FINDINGS:  There is mild DJD and a possible chronic labral tear.  There is mild trochanteric enthesopathy.  No fracture dislocation bone destruction seen.  No AVN seen.  Intrapelvic contents are unremarkable.  No adenopathy seen.     Impression:     Mild DJD and possible chronic labral tear.        Electronically signed by:Grey Ochoa MD  Date:                                            02/03/2025  Time:                                           08:36    MRI LUMBAR SPINE WITHOUT CONTRAST     CLINICAL HISTORY:  Low back pain, symptoms persist with > 6wks conservative treatment; Lumbago with sciatica, right side     TECHNIQUE:  Multiplanar, multisequence MR images were acquired from the thoracolumbar junction to the sacrum without the administration of contrast.     COMPARISON:  10/21/2019     FINDINGS:  Alignment: Grade 1 anterolisthesis of L3-4.     Vertebrae: 5 lumbar-type vertebral bodies. No aggressive marrow replacement process or fracture.Nobone marrow edema .     Discs: Degenerative changes  L2-L5 disc space levels with disc space narrowing L4-5.     Cord: Normal. Conus terminates at L1.     Degenerative findings:     T12-L1: There is no focal disc herniation. No significant central canal narrowing . No significant neural foraminal narrowing.     L1-L2: There is no focal disc herniation. No significant central canal narrowing . No significant neural foraminal narrowing.     L2-L3: There is no focal disc herniation.Moderate hypertrophic changes of facets/ligamentum flavum thickening. Mild central canal narrowing . No significant neural foraminal narrowing.     L3-L4: There is no focal disc herniation.Broad-based disc bulge, facet degenerative change and ligamentum flavum thickening which contribute to  severe central canal narrowing .  Moderate right and mild left neural foraminal narrowing.     L4-L5: There is no focal disc herniation.Broad-based disc bulge, facet degenerative change and ligamentum flavum thickening which contribute to  severe central canal narrowing . Moderate right and mild left neural foraminal narrowing.     L5-S1: There is no focal disc herniation.Mild hypertrophic changes of facets/ligamentum flavum thickening. Mild central canal narrowing . No significant neural foraminal narrowing.     Paraspinal muscles & soft tissues: Unremarkable.     Impression:     Lumbar spondylosis L2-L5 worst at L3-4 and L4-5.        Electronically signed by: De Ross MD  Date:                                            11/30/2023  Time:                                           09:36    Allergies:   Review of patient's allergies indicates:   Allergen Reactions    Sulfa (sulfonamide antibiotics) Itching and Rash     Other reaction(s): Rash       Current Medications:   Current Outpatient Medications   Medication Sig Dispense Refill    acetaminophen (TYLENOL) 500 MG tablet Take 500 mg by mouth every 6 (six) hours as needed for Pain.      amLODIPine (NORVASC) 10 MG tablet Take 1 tablet (10 mg total)  by mouth once daily. 90 tablet 3    apple cider vinegar 500 mg Tab       ascorbic acid, vitamin C, (VITAMIN C) 500 MG tablet Take 500 mg by mouth once daily.      aspirin 81 mg Tab Take by mouth. 1 Tablet Oral Every day      B-complex with vitamin C (Z-BEC OR EQUIV) tablet       biotin 1,000 mcg Chew       cetirizine (ZYRTEC) 10 MG tablet Take 10 mg by mouth once daily.      cholecalciferol, vitamin D3, (VITAMIN D3) 25 mcg (1,000 unit) capsule Take 1,000 Units by mouth once daily.      docusate sodium (COLACE) 100 MG capsule Take 100 mg by mouth once daily.      ezetimibe (ZETIA) 10 mg tablet Take 1 tablet (10 mg total) by mouth once daily. 90 tablet 3    green tea leaf extract 315 mg Cap Take by mouth. 1 Capsule Oral Every day      hydrALAZINE (APRESOLINE) 25 MG tablet Take 1 tablet (25 mg total) by mouth every 12 (twelve) hours. 180 tablet 3    LIDOcaine (LIDODERM) 5 % Place 1 patch onto the skin once daily. Remove & Discard patch within 12 hours or as directed by MD 30 patch 2    lisinopriL (PRINIVIL,ZESTRIL) 40 MG tablet Take 1 tablet (40 mg total) by mouth once daily. 90 tablet 3    magnesium 250 mg Tab       melatonin 1 mg Chew Take by mouth.      methocarbamoL (ROBAXIN) 500 MG Tab Take 1 tablet (500 mg total) by mouth 3 (three) times daily as needed. 90 tablet 2    metoprolol succinate (TOPROL-XL) 50 MG 24 hr tablet Take 1 tablet (50 mg total) by mouth once daily. 90 tablet 3    omega-3 fatty acids 1,000 mg Cap Take by mouth. 1 Capsule Oral Every day      pantoprazole (PROTONIX) 40 MG tablet Take 1 tablet (40 mg total) by mouth once daily. 90 tablet 3    rutin/hesp/bioflav/C/jiinjg341 (BIOFLEX ORAL) Take by mouth.      UNABLE TO FIND medication name: Tumeric 1000 mg daily      VITAMIN E,DL-ALPHA TOCOPHEROL, (VITAMIN E, BULK, MISC) by Misc.(Non-Drug; Combo Route) route.       No current facility-administered medications for this visit.       REVIEW OF SYSTEMS:    GENERAL:  No weight loss, malaise or  fevers.  HEENT:  Negative for frequent or significant headaches.  NECK:  Negative for lumps, goiter, pain and significant neck swelling.  RESPIRATORY:  Negative for cough, wheezing or shortness of breath.  CARDIOVASCULAR:  Negative for chest pain, leg swelling or palpitations.  GI:  Negative for abdominal discomfort, blood in stools or black stools or change in bowel habits.  MUSCULOSKELETAL:  See HPI.  SKIN:  Negative for lesions, rash, and itching.  PSYCH:  Negative for sleep disturbance, mood disorder and recent psychosocial stressors.  HEMATOLOGY/LYMPHOLOGY:  Negative for prolonged bleeding, bruising easily or swollen nodes. +ASA 81mg  NEURO:   No history of headaches, syncope, paralysis, seizures or tremors.  All other reviewed and negative other than HPI.    Past Medical History:  Past Medical History:   Diagnosis Date    Arthritis     Blood transfusion     Branch retinal vein occlusion of left eye     Cataract     Chronic midline low back pain with bilateral sciatica 09/08/2023    Corneal abrasion 20 yrs    ? eye due to cls     GERD (gastroesophageal reflux disease)     Hyperlipidemia     Hypertension     Lattice degeneration     Osteoarthritis     Seasonal allergies Feb 2024       Past Surgical History:  Past Surgical History:   Procedure Laterality Date    BLOCK, NERVE, PERIPHERAL Bilateral 10/4/2024    Procedure: bilateral Greater Trochanteric Bursae Block without Steroid.;  Surgeon: Raj Pierre MD;  Location: [facility];  Service: Pain Management;  Laterality: Bilateral;  20 mins ASA ok    Breast reduction      BREAST SURGERY      COLONOSCOPY N/A 07/20/2022    Procedure: COLONOSCOPY;  Surgeon: Chanda Hawkins MD;  Location: [facility];  Service: Endoscopy;  Laterality: N/A;    ESOPHAGOGASTRODUODENOSCOPY N/A 07/20/2022    Procedure: ESOPHAGOGASTRODUODENOSCOPY (EGD);  Surgeon: Chanda Hawkins MD;  Location: [facility];  Service: Endoscopy;  Laterality: N/A;    EYE SURGERY       HYSTERECTOMY      INJECTION Bilateral 01/18/2024    Procedure: INJECTION, BILATERAL PIRIFORMIS;  Surgeon: Raj Pierre MD;  Location: [facility];  Service: Pain Management;  Laterality: Bilateral;  [phone]  2 WK F/U ERIBERTO    INJECTION OF JOINT Bilateral 03/07/2024    Procedure: INJECTION, JOINT BILATERAL SI AND BILATERAL GTB;  Surgeon: Raj Pierre MD;  Location: [facility];  Service: Pain Management;  Laterality: Bilateral;  [phone]  2 WK F/U ERIBERTO    LASER LAPAROSCOPY  1988    LASIK Bilateral     MINI-LAPAROTOMY  1988    OOPHORECTOMY      TOTAL REDUCTION MAMMOPLASTY         Family History:  Family History   Problem Relation Name Age of Onset    Arthritis Mother Misa M. mckinley     Heart disease Mother Misa M. mckinley     Hypertension Mother Misa M. mckinley     Cataracts Mother Misa M. mckinley     Heart disease Father Sagar K. Mckinley     Hypertension Father Sagar K. Mckinley     Stroke Father Sagar K. Mckinley     Diabetes Sister Kenia NAZARIO. Rivera     Hypertension Sister Kenia NAZARIO. Rivera     Hypertension Sister Wendi Mckinley     Eczema Other niece     Arthritis Maternal Grandmother Luly CRANEMaulik Barr     Heart disease Maternal Grandmother Luly JMaulik Barr     Hypertension Maternal Grandmother Luly TY Barr     Arthritis Maternal Grandfather Italo Barr     Arthritis Paternal Grandmother Hailey E. Mckinley     Heart disease Paternal Grandmother Hailey E. Mckinley     Stroke Paternal Grandmother Hailey E. Mckinley     Arthritis Paternal Grandfather Aashish  Mckinley     No Known Problems Brother      No Known Problems Maternal Aunt      No Known Problems Maternal Uncle      No Known Problems Paternal Aunt      No Known Problems Paternal Uncle      Ovarian cancer Neg Hx      Breast cancer Neg Hx      Anxiety disorder Neg Hx      Depression Neg Hx      Suicide Neg Hx      Amblyopia Neg Hx      Blindness Neg Hx      Cancer Neg Hx      Glaucoma Neg Hx      Macular  degeneration Neg Hx      Retinal detachment Neg Hx      Strabismus Neg Hx      Thyroid disease Neg Hx      Colon cancer Neg Hx      Esophageal cancer Neg Hx         Social History:  Social History     Socioeconomic History    Marital status:     Number of children: 0   Occupational History    Occupation: RN     Employer: [employer]   Tobacco Use    Smoking status: Never     Passive exposure: Never    Smokeless tobacco: Never   Substance and Sexual Activity    Alcohol use: Not Currently     Comment: rare glass of wine  about 2 to 3 times/ year    Drug use: No    Sexual activity: Not Currently     Partners: Male     Birth control/protection: Post-menopausal, See Surgical Hx, None   Social History Narrative      several years ago    She has not been sexually-active since    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 [facility]     Social Drivers of Health     Financial Resource Strain: Low Risk  (4/3/2025)    Overall Financial Resource Strain (CARDIA)     Difficulty of Paying Living Expenses: Not hard at all   Food Insecurity: No Food Insecurity (4/3/2025)    Hunger Vital Sign     Worried About Running Out of Food in the Last Year: Never true     Ran Out of Food in the Last Year: Never true   Transportation Needs: No Transportation Needs (4/3/2025)    PRAPARE - Transportation     Lack of Transportation (Medical): No     Lack of Transportation (Non-Medical): No   Physical Activity: Sufficiently Active (4/3/2025)    Exercise Vital Sign     Days of Exercise per Week: 6 days     Minutes of Exercise per Session: 60 min   Recent Concern: Physical Activity - Insufficiently Active (2025)    Exercise Vital Sign     Days of Exercise per Week: 2 days     Minutes of Exercise per Session: 70 min   Stress: No Stress Concern Present (4/3/2025)    [employer] [place] [employer] - Occupational Stress Questionnaire     Feeling of Stress : Not at all   Housing Stability: Low  "Risk  (4/3/2025)    Housing Stability Vital Sign     Unable to Pay for Housing in the Last Year: No     Number of Times Moved in the Last Year: 0     Homeless in the Last Year: No       OBJECTIVE:    BP (!) 174/91 (BP Location: Left arm, Patient Position: Sitting)   Pulse 75   Ht 5' 2" (1.575 m)   Wt 80.9 kg (178 lb 5.6 oz)   BMI 32.62 kg/m²     PHYSICAL EXAMINATION:  General appearance: Well appearing, in no acute distress, alert and appropriately communicative.  Psych:  Mood and affect appropriate.  Skin: Skin color, texture, turgor normal, no rashes or lesions, in both upper and lower body.  Head/face:  Atraumatic, normocephalic.  Cor: regular rate  Pulm: non-labored breathing  GI: Abdomen non-distended and non-tender.  Back: Straight leg raising in the sitting and supine positions is negative to radicular pain.  pain to palpation over the spine and/or paraspinal muscles. Pain with lumbar extension and facet loading.  +bilateral Yeomans, +bilateral Phoebe finger, +SI tenderness  Extremities: Peripheral joint ROM is full and pain free without obvious instability or laxity in all four extremities. No deformities, edema, or skin discoloration. Good capillary refill.  Musculoskeletal: No gross deformities on inspection. Significant TTP over bilateral trochanteric bursa. .  Bilateral upper and lower extremity strength is normal and symmetric.  No atrophy or tone abnormalities are noted.  Neuro: Bilateral upper and lower extremity coordination and muscle stretch reflexes are physiologic and symmetric.  Negative Clonus. No loss of sensation is noted.  Gait: antalgic.    CMP  Sodium   Date Value Ref Range Status   02/17/2025 141 136 - 145 mmol/L Final     Potassium   Date Value Ref Range Status   02/17/2025 3.8 3.5 - 5.1 mmol/L Final     Chloride   Date Value Ref Range Status   02/17/2025 102 95 - 110 mmol/L Final     CO2   Date Value Ref Range Status   02/17/2025 28 23 - 29 mmol/L Final     Glucose   Date Value Ref " Range Status   02/17/2025 99 70 - 110 mg/dL Final     BUN   Date Value Ref Range Status   02/17/2025 24 (H) 8 - 23 mg/dL Final     Creatinine   Date Value Ref Range Status   02/17/2025 1.6 (H) 0.5 - 1.4 mg/dL Final     Calcium   Date Value Ref Range Status   02/17/2025 10.3 8.7 - 10.5 mg/dL Final     Total Protein   Date Value Ref Range Status   02/17/2025 8.5 (H) 6.0 - 8.4 g/dL Final     Albumin   Date Value Ref Range Status   02/17/2025 3.8 3.5 - 5.2 g/dL Final     Total Bilirubin   Date Value Ref Range Status   02/17/2025 0.5 0.1 - 1.0 mg/dL Final     Comment:     For infants and newborns, interpretation of results should be based  on gestational age, weight and in agreement with clinical  observations.    Premature Infant recommended reference ranges:  Up to 24 hours.............<8.0 mg/dL  Up to 48 hours............<12.0 mg/dL  3-5 days..................<15.0 mg/dL  6-29 days.................<15.0 mg/dL       Alkaline Phosphatase   Date Value Ref Range Status   02/17/2025 71 40 - 150 U/L Final     AST   Date Value Ref Range Status   02/17/2025 20 10 - 40 U/L Final     ALT   Date Value Ref Range Status   02/17/2025 15 10 - 44 U/L Final     Anion Gap   Date Value Ref Range Status   02/17/2025 11 8 - 16 mmol/L Final     eGFR   Date Value Ref Range Status   02/17/2025 35 (A) >60 mL/min/1.73 m^2 Final   11/21/2023 31 (L) > OR = 60 mL/min/1.73m2 Final       ASSESSMENT: 70 y.o. year old female with buttock/hip pain, consistent with:    1. Bilateral primary osteoarthritis of hip  Procedure Order to Pain Management      2. Greater trochanteric bursitis, unspecified laterality  Procedure Order to Pain Management          IMPRESSION: Riley Marcus presents today for bilateral hip and bilateral buttock.  Symptoms are primarily related to greater trochanteric pain syndrome, bilateral hip arthropathy, and ischial bursitis bilaterally. Recent XR imaging reviewed with evidence of diffuse pelvic enthesopathy which is likely  contributing to symptoms. She continues to have GTB bursitis, but she also has more hip arthropathy. We will offer an intervention to help with this.    PLAN:   - I have stressed the importance of physical activity and a home exercise plan to help with pain and improve health.  - Patient can continue with medications for now since they are providing benefits, using them appropriately, and without side effects. The following recommendations to medication changes:   - Schedule patient for Bilateral hip steroid injection  - we discussed her GTB blocks. She may have had benefit, but she doesn't know where she put her pain diary. She will look for this and get back to us. It GTB block was successful can proceed with cooled RF of bilateral greater trochanteric bursae.   - we can consider ischial bursa injection once she has considered workup/management for her GTB pain.  - Continue physician prescribed HEP/HSP.   - Counseled patient regarding the importance of activity modification and physical therapy.  - f/u 2 weeks after intervention    The above plan and management options were discussed at length with patient. Patient is in agreement with the above and verbalized understanding.    Raj Pierre MD PhD

## 2025-05-13 ENCOUNTER — PATIENT MESSAGE (OUTPATIENT)
Dept: PAIN MEDICINE | Facility: OTHER | Age: 70
End: 2025-05-13
Payer: MEDICARE

## 2025-05-15 ENCOUNTER — HOSPITAL ENCOUNTER (OUTPATIENT)
Facility: OTHER | Age: 70
Discharge: HOME | End: 2025-05-15
Attending: STUDENT IN AN ORGANIZED HEALTH CARE EDUCATION/TRAINING PROGRAM | Admitting: STUDENT IN AN ORGANIZED HEALTH CARE EDUCATION/TRAINING PROGRAM
Payer: MEDICARE

## 2025-05-15 VITALS
TEMPERATURE: 98 F | DIASTOLIC BLOOD PRESSURE: 90 MMHG | SYSTOLIC BLOOD PRESSURE: 186 MMHG | BODY MASS INDEX: 32.02 KG/M2 | HEIGHT: 62 IN | HEART RATE: 80 BPM | OXYGEN SATURATION: 97 % | RESPIRATION RATE: 18 BRPM | WEIGHT: 174 LBS

## 2025-05-15 DIAGNOSIS — J38.3 VOCAL CORD ATROPHY: ICD-10-CM

## 2025-05-15 DIAGNOSIS — M16.9 HIP OSTEOARTHRITIS: ICD-10-CM

## 2025-05-15 DIAGNOSIS — M16.0 BILATERAL HIP JOINT ARTHRITIS: Primary | ICD-10-CM

## 2025-05-15 PROCEDURE — 20610 DRAIN/INJ JOINT/BURSA W/O US: CPT | Mod: 50,,, | Performed by: STUDENT IN AN ORGANIZED HEALTH CARE EDUCATION/TRAINING PROGRAM

## 2025-05-15 PROCEDURE — 63600175 PHARM REV CODE 636 W HCPCS: Performed by: STUDENT IN AN ORGANIZED HEALTH CARE EDUCATION/TRAINING PROGRAM

## 2025-05-15 PROCEDURE — 25500020 PHARM REV CODE 255: Performed by: STUDENT IN AN ORGANIZED HEALTH CARE EDUCATION/TRAINING PROGRAM

## 2025-05-15 PROCEDURE — 20610 DRAIN/INJ JOINT/BURSA W/O US: CPT | Mod: 50 | Performed by: STUDENT IN AN ORGANIZED HEALTH CARE EDUCATION/TRAINING PROGRAM

## 2025-05-15 RX ORDER — BUPIVACAINE HYDROCHLORIDE 2.5 MG/ML
INJECTION, SOLUTION EPIDURAL; INFILTRATION; INTRACAUDAL; PERINEURAL
Status: DISCONTINUED | OUTPATIENT
Start: 2025-05-15 | End: 2025-05-15 | Stop reason: HOSPADM

## 2025-05-15 RX ORDER — LIDOCAINE HYDROCHLORIDE 20 MG/ML
INJECTION, SOLUTION INFILTRATION; PERINEURAL
Status: DISCONTINUED | OUTPATIENT
Start: 2025-05-15 | End: 2025-05-15 | Stop reason: HOSPADM

## 2025-05-15 RX ORDER — SODIUM CHLORIDE 9 MG/ML
INJECTION, SOLUTION INTRAVENOUS CONTINUOUS
Status: DISCONTINUED | OUTPATIENT
Start: 2025-05-15 | End: 2025-05-15 | Stop reason: HOSPADM

## 2025-05-15 RX ORDER — TRIAMCINOLONE ACETONIDE 40 MG/ML
INJECTION, SUSPENSION INTRA-ARTICULAR; INTRAMUSCULAR
Status: DISCONTINUED | OUTPATIENT
Start: 2025-05-15 | End: 2025-05-15 | Stop reason: HOSPADM

## 2025-05-15 NOTE — OP NOTE
Hip Joint Injection under Fluoroscopic Guidance    The procedure, risks, benefits, and options were discussed with the patient. There are no contraindications to the procedure. The patent expressed understanding and agreed to the procedure. Informed written consent was obtained prior to the start of the procedure and can be found in the patient's chart.    PATIENT NAME: Riley Marcus   MRN: [id number]     DATE OF PROCEDURE: 05/15/2025    PROCEDURE: Bilateral Hip Joint Injection under Fluoroscopic Guidance    PRE-OP DIAGNOSIS: Bilateral primary osteoarthritis of hip [M16.0]    POST-OP DIAGNOSIS: Bilateral primary osteoarthritis of hip [M16.0]    PHYSICIAN: Raj Pierre MD    ASSISTANTS: Kendall Dillon MD      MEDICATIONS INJECTED: Preservative-free Kenalog 40mg with 3cc of Bupivacine 0.25%     LOCAL ANESTHETIC INJECTED: Xylocaine 2%     SEDATION: None    ESTIMATED BLOOD LOSS: None    COMPLICATIONS: None    TECHNIQUE: Time-out was performed to identify the patient and procedure to be performed. With the patient laying in a supine position, the surgical area was prepped and draped in the usual sterile fashion using ChloraPrep and a fenestrated drape. The area overlying the hip joint was determined under fluoroscopy guidance. Skin anesthesia was achieved by injecting Lidocaine 2% over the injection site. A 22 gauge, 3.5 inch spinal quinke needle was introduced under fluoroscopy until the tip reached the greater trochanter. The tip of the needle was hinged cephalad from the greater trochanter into the joint space.  When the needle tip was in the appropriate position, and there was no blood aspiration, Contrast dye  Omnipaque (300mg/mL) was injected to confirm placement and there was no vascular runoff. 4 mL of the medication mixture listed above was injected slowly. The needles were removed and bleeding was nil. A sterile dressing was applied. No specimens collected. The procedure was repeated on the opposite side.  The  patient tolerated the procedure well.    The patient was monitored after the procedure in the recovery area. They were given post-procedure and discharge instructions to follow at home. The patient was discharged in a stable condition.    Raj Pierre MD

## 2025-05-15 NOTE — DISCHARGE INSTRUCTIONS

## 2025-05-15 NOTE — DISCHARGE SUMMARY
Discharge Note  Short Stay      SUMMARY     Admit Date: 5/15/2025    Attending Physician: Raj Pierre MD PhD    Discharge Physician: Raj Pierre      Discharge Date: 5/15/2025 11:40 AM    Procedure(s) (LRB):  INJECTION, BILATERAL HIP (Bilateral)    Final Diagnosis: Bilateral primary osteoarthritis of hip [M16.0]    Disposition: Home or self care    Patient Instructions:   Current Discharge Medication List        CONTINUE these medications which have NOT CHANGED    Details   aspirin 81 mg Tab Take by mouth. 1 Tablet Oral Every day      acetaminophen (TYLENOL) 500 MG tablet Take 500 mg by mouth every 6 (six) hours as needed for Pain.      amLODIPine (NORVASC) 10 MG tablet Take 1 tablet (10 mg total) by mouth once daily.  Qty: 90 tablet, Refills: 3    Comments: .  Associated Diagnoses: Primary hypertension      apple cider vinegar 500 mg Tab       ascorbic acid, vitamin C, (VITAMIN C) 500 MG tablet Take 500 mg by mouth once daily.      B-complex with vitamin C (Z-BEC OR EQUIV) tablet       biotin 1,000 mcg Chew       cetirizine (ZYRTEC) 10 MG tablet Take 10 mg by mouth once daily.      cholecalciferol, vitamin D3, (VITAMIN D3) 25 mcg (1,000 unit) capsule Take 1,000 Units by mouth once daily.      docusate sodium (COLACE) 100 MG capsule Take 100 mg by mouth once daily.      ezetimibe (ZETIA) 10 mg tablet Take 1 tablet (10 mg total) by mouth once daily.  Qty: 90 tablet, Refills: 3    Associated Diagnoses: Myalgia due to statin; Pure hypercholesterolemia      green tea leaf extract 315 mg Cap Take by mouth. 1 Capsule Oral Every day      hydrALAZINE (APRESOLINE) 25 MG tablet Take 1 tablet (25 mg total) by mouth every 12 (twelve) hours.  Qty: 180 tablet, Refills: 3    Comments: .  Associated Diagnoses: Primary hypertension      LIDOcaine (LIDODERM) 5 % Place 1 patch onto the skin once daily. Remove & Discard patch within 12 hours or as directed by MD  Qty: 30 patch, Refills: 2    Associated Diagnoses: Sacroiliac joint  pain; Greater trochanteric bursitis, unspecified laterality      lisinopriL (PRINIVIL,ZESTRIL) 40 MG tablet Take 1 tablet (40 mg total) by mouth once daily.  Qty: 90 tablet, Refills: 3    Comments: .  Associated Diagnoses: Primary hypertension      magnesium 250 mg Tab       melatonin 1 mg Chew Take by mouth.      methocarbamoL (ROBAXIN) 500 MG Tab Take 1 tablet (500 mg total) by mouth 3 (three) times daily as needed.  Qty: 90 tablet, Refills: 2    Associated Diagnoses: Spinal stenosis of lumbar region with neurogenic claudication; Myofascial pain syndrome      metoprolol succinate (TOPROL-XL) 50 MG 24 hr tablet Take 1 tablet (50 mg total) by mouth once daily.  Qty: 90 tablet, Refills: 3    Comments: .  Associated Diagnoses: Primary hypertension      omega-3 fatty acids 1,000 mg Cap Take by mouth. 1 Capsule Oral Every day      pantoprazole (PROTONIX) 40 MG tablet Take 1 tablet (40 mg total) by mouth once daily.  Qty: 90 tablet, Refills: 3    Associated Diagnoses: Gastroesophageal reflux disease, unspecified whether esophagitis present      rutin/hesp/bioflav/[id number] (BIOFLEX ORAL) Take by mouth.      UNABLE TO FIND medication name: Tumeric 1000 mg daily      VITAMIN E,DL-ALPHA TOCOPHEROL, (VITAMIN E, BULK, MISC) by Misc.(Non-Drug; Combo Route) route.                 Discharge Diagnosis: Bilateral primary osteoarthritis of hip [M16.0]  Condition on Discharge: Stable with no complications to procedure   Diet on Discharge: Same as before.  Activity: as per instruction sheet.  Discharge to: Home with a responsible adult.  Follow up: 2-4 weeks       Please call my office or pager at [phone] if experienced any weakness or loss of sensation, fever > 101.5, pain uncontrolled with oral medications, persistent nausea/vomiting/or diarrhea, redness or drainage from the incisions, or any other worrisome concerns. If physician on call was not reached or could not communicate with our office for any reason please go to  the nearest emergency department      Raj Pierre MD PhD

## 2025-05-29 ENCOUNTER — OFFICE VISIT (OUTPATIENT)
Dept: PAIN MEDICINE | Facility: CLINIC | Age: 70
End: 2025-05-29
Payer: MEDICARE

## 2025-05-29 VITALS
TEMPERATURE: 98 F | HEART RATE: 103 BPM | RESPIRATION RATE: 18 BRPM | DIASTOLIC BLOOD PRESSURE: 74 MMHG | WEIGHT: 171.94 LBS | OXYGEN SATURATION: 100 % | BODY MASS INDEX: 31.64 KG/M2 | HEIGHT: 62 IN | SYSTOLIC BLOOD PRESSURE: 183 MMHG

## 2025-05-29 DIAGNOSIS — M79.18 MYOFASCIAL PAIN SYNDROME: ICD-10-CM

## 2025-05-29 DIAGNOSIS — M51.362 DEGENERATION OF INTERVERTEBRAL DISC OF LUMBAR REGION WITH DISCOGENIC BACK PAIN AND LOWER EXTREMITY PAIN: ICD-10-CM

## 2025-05-29 DIAGNOSIS — M70.60 GREATER TROCHANTERIC BURSITIS, UNSPECIFIED LATERALITY: Primary | ICD-10-CM

## 2025-05-29 PROCEDURE — 3066F NEPHROPATHY DOC TX: CPT | Mod: CPTII,S$GLB,, | Performed by: STUDENT IN AN ORGANIZED HEALTH CARE EDUCATION/TRAINING PROGRAM

## 2025-05-29 PROCEDURE — 1101F PT FALLS ASSESS-DOCD LE1/YR: CPT | Mod: CPTII,S$GLB,, | Performed by: STUDENT IN AN ORGANIZED HEALTH CARE EDUCATION/TRAINING PROGRAM

## 2025-05-29 PROCEDURE — G2211 COMPLEX E/M VISIT ADD ON: HCPCS | Mod: S$GLB,,, | Performed by: STUDENT IN AN ORGANIZED HEALTH CARE EDUCATION/TRAINING PROGRAM

## 2025-05-29 PROCEDURE — 1160F RVW MEDS BY RX/DR IN RCRD: CPT | Mod: CPTII,S$GLB,, | Performed by: STUDENT IN AN ORGANIZED HEALTH CARE EDUCATION/TRAINING PROGRAM

## 2025-05-29 PROCEDURE — 4010F ACE/ARB THERAPY RXD/TAKEN: CPT | Mod: CPTII,S$GLB,, | Performed by: STUDENT IN AN ORGANIZED HEALTH CARE EDUCATION/TRAINING PROGRAM

## 2025-05-29 PROCEDURE — 3288F FALL RISK ASSESSMENT DOCD: CPT | Mod: CPTII,S$GLB,, | Performed by: STUDENT IN AN ORGANIZED HEALTH CARE EDUCATION/TRAINING PROGRAM

## 2025-05-29 PROCEDURE — 3078F DIAST BP <80 MM HG: CPT | Mod: CPTII,S$GLB,, | Performed by: STUDENT IN AN ORGANIZED HEALTH CARE EDUCATION/TRAINING PROGRAM

## 2025-05-29 PROCEDURE — 3077F SYST BP >= 140 MM HG: CPT | Mod: CPTII,S$GLB,, | Performed by: STUDENT IN AN ORGANIZED HEALTH CARE EDUCATION/TRAINING PROGRAM

## 2025-05-29 PROCEDURE — 1125F AMNT PAIN NOTED PAIN PRSNT: CPT | Mod: CPTII,S$GLB,, | Performed by: STUDENT IN AN ORGANIZED HEALTH CARE EDUCATION/TRAINING PROGRAM

## 2025-05-29 PROCEDURE — 99214 OFFICE O/P EST MOD 30 MIN: CPT | Mod: S$GLB,,, | Performed by: STUDENT IN AN ORGANIZED HEALTH CARE EDUCATION/TRAINING PROGRAM

## 2025-05-29 PROCEDURE — 3008F BODY MASS INDEX DOCD: CPT | Mod: CPTII,S$GLB,, | Performed by: STUDENT IN AN ORGANIZED HEALTH CARE EDUCATION/TRAINING PROGRAM

## 2025-05-29 PROCEDURE — 99999 PR PBB SHADOW E&M-EST. PATIENT-LVL V: CPT | Mod: PBBFAC,,, | Performed by: STUDENT IN AN ORGANIZED HEALTH CARE EDUCATION/TRAINING PROGRAM

## 2025-05-29 PROCEDURE — 1159F MED LIST DOCD IN RCRD: CPT | Mod: CPTII,S$GLB,, | Performed by: STUDENT IN AN ORGANIZED HEALTH CARE EDUCATION/TRAINING PROGRAM

## 2025-05-29 NOTE — PROGRESS NOTES
Chronic patient Established Note (Follow up visit)      SUBJECTIVE:  INTERVAL HISTORY 5/29/2025:  Ms. Marcus returns for lateral hip pain. Current Pain level is 8/10.  She is s/p bilateral hip injections. She no longer has pain radiating into the groins, although she does occasionally have pain which radiates down the front of her right thigh.  She feels that her bursa injection she has had in the past have helped with her pain, and she wishes to repeat this.  She does prefer the procedure in clinic.    INTERVAL HISTORY 5/5/2025:  Ms. Marcus returns for bilateral hip (right > left). Current Pain level is 9/10.  She reports the pain bothers her when she sits/stands.  She also reports the pain wakes her up at night. Since her last visit, she saw orthopedic surgery and had MRIs of her hips completed.  She reports that they offered her no intervention based off her MRI. She does feel the pain radiates into her right > left groin sometimes.    INTERVAL HISTORY 9/23/2024:  Riley Marcus is a 70 y.o. who presents to the clinic for follow up evaluation and management of low back and BL hip pain in the setting of SIJ dysfunction, lumbar radiculopathy, greater trochanteric pain syndrome, and myofascial pain syndrome.. Last seen in clinic on 3/21/2024 at which time we discussed formal PT referral for persistent pain complaints. Today, pt persents with primary complaint of BL buttock pain and lateral hip pain. Symptoms localized over ischium bilaterally and greater troch bilaterally. Pain is worse with prolonged walking and sitting  . Current pain 7-8/10.  Ibuprofen, naproxen, somewhat helpful--pt advised to discontinue NSAIDs d/t CKD.. Methocarbamol without much benefit. Tylenol 500 1-2 times daily. Also using topical lidocaine and diclofenac with benefit.    INTERVAL HISTORY 3/21/2024:  Riley Marcus presents for follow-up s/p Bilateral SIJ and Bilateral GTB on 3/7/2024.  She states this has provided 70% relief to her  SIJ and 0% to the GTB. She continues robaxin 500 mg TID PRN and Lidocaine patches as needed with  moderate relief.  She denies any perceived side effects.   The patient denies fever/night sweats, urinary incontinence, bowel incontinence, significant weight changes, significant motor weakness or changes, or loss of sensations.  Her pain today is 7/10    INTERVAL HISTORY 2/26/2024:  Riley Marcus presents to the clinic for a follow-up appointment for chronic pain. Current pain intensity is 8/10.  Since the last visit, Riley Marcus states:  after her piriformis injection, she had 100% pain relief which lasted 3 weeks.  She however states that the pain has returned, and is worse in the morning.  It tends to feel better as the day progresses.  She continues to go to the gym 3 times a week and actively participates in a home exercise program to help her pain.      PRIOR HISTORY:   Riley Marcus presents to the clinic for the evaluation of lower back and bilateral buttocks pain. The pain started many years ago following no inciting event and symptoms have been worsening.The pain is located in the lower back area and radiates to the bilateral buttocks.  The pain is described as sharp and is rated as 8/10. The pain is rated with a score of  5/10 on the BEST day and a score of 8/10 on the WORST day.  Symptoms interfere with daily activity. The pain is exacerbated by walking, kneeling (putting close together), standing.  The pain is mitigated by moving legs far apart. The patient reports 7-8 hours of uninterrupted sleep per night.    Patient denies urinary incontinence, bowel incontinence, and significant motor weakness. She had a caudal MELL in 2015 which she didn't find beneficial    Physical Therapy/Home Exercise: yes, completed in October 2023. Continuing to do home exercise program.      Pain Disability Index Review:      5/29/2025     2:48 PM 5/5/2025     8:44 AM 9/23/2024    10:19 AM   Last 3 PDI Scores    Pain Disability Index (PDI) 56 56 49         Pain Medications:   - tylenol extended release   - ibuprofen   - naproxen     report:  Reviewed and consistent with medication use as prescribed.  03/11/2022 03/11/2022 1 Oxycodone-Acetaminophn 7.5-325 16.00 4 [facility]       Pain Procedures:   11/23/2015: Caudal MELL ([place])  1/18/2024: Bilateral piriformis 100% for 3 weeks (still resolution of sciatica pain)  3/7/2024 - Bilateral SIJ and Bilateral GTB - 70% in the SIJ 0% in GTB relief  10/4/2024 - Bilateral GTB block    Imaging:   MRI HIP WITHOUT CONTRAST LEFT     CLINICAL HISTORY:  Hip pain, chronic, tendon/ligament abnormality suspected, xray done;  Pain in right hip     FINDINGS:  No fracture dislocation bone destruction seen.  There is no evidence of AVN or transient osteopenia.  There is no significant joint fluid.  No adenopathy is seen.  Intrapelvic contents are unremarkable.  There is mild DJD.  There is a possible chronic labral tear.  No definite impingement change seen.     Impression:     Mild DJD and possible chronic labral tear.        Electronically signed by:Grey Ochoa MD  Date:                                            02/03/2025  Time:                                           08:35    MRI HIP WITHOUT CONTRAST RIGHT     CLINICAL HISTORY:  Hip pain, chronic, tendon/ligament abnormality suspected, xray done;  Pain in right hip     FINDINGS:  There is mild DJD and a possible chronic labral tear.  There is mild trochanteric enthesopathy.  No fracture dislocation bone destruction seen.  No AVN seen.  Intrapelvic contents are unremarkable.  No adenopathy seen.     Impression:     Mild DJD and possible chronic labral tear.        Electronically signed by:Grey Ochoa MD  Date:                                            02/03/2025  Time:                                           08:36    MRI LUMBAR SPINE WITHOUT CONTRAST     CLINICAL HISTORY:  Low back pain, symptoms persist with > 6wks conservative  treatment; Lumbago with sciatica, right side     TECHNIQUE:  Multiplanar, multisequence MR images were acquired from the thoracolumbar junction to the sacrum without the administration of contrast.     COMPARISON:  10/21/2019     FINDINGS:  Alignment: Grade 1 anterolisthesis of L3-4.     Vertebrae: 5 lumbar-type vertebral bodies. No aggressive marrow replacement process or fracture.Nobone marrow edema .     Discs: Degenerative changes L2-L5 disc space levels with disc space narrowing L4-5.     Cord: Normal. Conus terminates at L1.     Degenerative findings:     T12-L1: There is no focal disc herniation. No significant central canal narrowing . No significant neural foraminal narrowing.     L1-L2: There is no focal disc herniation. No significant central canal narrowing . No significant neural foraminal narrowing.     L2-L3: There is no focal disc herniation.Moderate hypertrophic changes of facets/ligamentum flavum thickening. Mild central canal narrowing . No significant neural foraminal narrowing.     L3-L4: There is no focal disc herniation.Broad-based disc bulge, facet degenerative change and ligamentum flavum thickening which contribute to  severe central canal narrowing .  Moderate right and mild left neural foraminal narrowing.     L4-L5: There is no focal disc herniation.Broad-based disc bulge, facet degenerative change and ligamentum flavum thickening which contribute to  severe central canal narrowing . Moderate right and mild left neural foraminal narrowing.     L5-S1: There is no focal disc herniation.Mild hypertrophic changes of facets/ligamentum flavum thickening. Mild central canal narrowing . No significant neural foraminal narrowing.     Paraspinal muscles & soft tissues: Unremarkable.     Impression:     Lumbar spondylosis L2-L5 worst at L3-4 and L4-5.        Electronically signed by: De Ross MD  Date:                                            11/30/2023  Time:                                            09:36    Allergies:   Review of patient's allergies indicates:   Allergen Reactions    Sulfa (sulfonamide antibiotics) Itching and Rash     Other reaction(s): Rash       Current Medications:   Current Outpatient Medications   Medication Sig Dispense Refill    acetaminophen (TYLENOL) 500 MG tablet Take 500 mg by mouth every 6 (six) hours as needed for Pain.      amLODIPine (NORVASC) 10 MG tablet Take 1 tablet (10 mg total) by mouth once daily. 90 tablet 3    apple cider vinegar 500 mg Tab       ascorbic acid, vitamin C, (VITAMIN C) 500 MG tablet Take 500 mg by mouth once daily.      aspirin 81 mg Tab Take by mouth. 1 Tablet Oral Every day      B-complex with vitamin C (Z-BEC OR EQUIV) tablet       biotin 1,000 mcg Chew       cetirizine (ZYRTEC) 10 MG tablet Take 10 mg by mouth once daily.      cholecalciferol, vitamin D3, (VITAMIN D3) 25 mcg (1,000 unit) capsule Take 1,000 Units by mouth once daily.      docusate sodium (COLACE) 100 MG capsule Take 100 mg by mouth once daily.      ezetimibe (ZETIA) 10 mg tablet Take 1 tablet (10 mg total) by mouth once daily. 90 tablet 3    green tea leaf extract 315 mg Cap Take by mouth. 1 Capsule Oral Every day      hydrALAZINE (APRESOLINE) 25 MG tablet Take 1 tablet (25 mg total) by mouth every 12 (twelve) hours. 180 tablet 3    LIDOcaine (LIDODERM) 5 % Place 1 patch onto the skin once daily. Remove & Discard patch within 12 hours or as directed by MD 30 patch 2    lisinopriL (PRINIVIL,ZESTRIL) 40 MG tablet Take 1 tablet (40 mg total) by mouth once daily. 90 tablet 3    magnesium 250 mg Tab       melatonin 1 mg Chew Take by mouth.      methocarbamoL (ROBAXIN) 500 MG Tab Take 1 tablet (500 mg total) by mouth 3 (three) times daily as needed. 90 tablet 2    metoprolol succinate (TOPROL-XL) 50 MG 24 hr tablet Take 1 tablet (50 mg total) by mouth once daily. 90 tablet 3    omega-3 fatty acids 1,000 mg Cap Take by mouth. 1 Capsule Oral Every day      pantoprazole (PROTONIX)  40 MG tablet Take 1 tablet (40 mg total) by mouth once daily. 90 tablet 3    rutin/hesp/bioflav/C/[id number] (BIOFLEX ORAL) Take by mouth.      UNABLE TO FIND medication name: Tumeric 1000 mg daily      VITAMIN E,DL-ALPHA TOCOPHEROL, (VITAMIN E, BULK, MISC) by Misc.(Non-Drug; Combo Route) route.       No current facility-administered medications for this visit.       REVIEW OF SYSTEMS:    GENERAL:  No weight loss, malaise or fevers.  HEENT:  Negative for frequent or significant headaches.  NECK:  Negative for lumps, goiter, pain and significant neck swelling.  RESPIRATORY:  Negative for cough, wheezing or shortness of breath.  CARDIOVASCULAR:  Negative for chest pain, leg swelling or palpitations.  GI:  Negative for abdominal discomfort, blood in stools or black stools or change in bowel habits.  MUSCULOSKELETAL:  See HPI.  SKIN:  Negative for lesions, rash, and itching.  PSYCH:  Negative for sleep disturbance, mood disorder and recent psychosocial stressors.  HEMATOLOGY/LYMPHOLOGY:  Negative for prolonged bleeding, bruising easily or swollen nodes. +ASA 81mg  NEURO:   No history of headaches, syncope, paralysis, seizures or tremors.  All other reviewed and negative other than HPI.    Past Medical History:  Past Medical History:   Diagnosis Date    Arthritis     Blood transfusion     Branch retinal vein occlusion of left eye     Cataract     Chronic midline low back pain with bilateral sciatica 09/08/2023    Corneal abrasion 20 yrs    ? eye due to cls     GERD (gastroesophageal reflux disease)     Hyperlipidemia     Hypertension     Lattice degeneration     Osteoarthritis     Seasonal allergies Feb 2024       Past Surgical History:  Past Surgical History:   Procedure Laterality Date    BLOCK, NERVE, PERIPHERAL Bilateral 10/4/2024    Procedure: bilateral Greater Trochanteric Bursae Block without Steroid.;  Surgeon: Raj Pierre MD;  [facility]: [facility];  Service: Pain Management;  Laterality: Bilateral;   20 mins ASA ok    Breast reduction      BREAST SURGERY      COLONOSCOPY N/A 07/20/2022    Procedure: COLONOSCOPY;  Surgeon: Chanda Hawkins MD;  Location: [facility] ENDO;  Service: Endoscopy;  Laterality: N/A;    ESOPHAGOGASTRODUODENOSCOPY N/A 07/20/2022    Procedure: ESOPHAGOGASTRODUODENOSCOPY (EGD);  Surgeon: Chanda Hawkins MD;  Location: [facility] ENDO;  Service: Endoscopy;  Laterality: N/A;    EYE SURGERY      HYSTERECTOMY      INJECTION Bilateral 01/18/2024    Procedure: INJECTION, BILATERAL PIRIFORMIS;  Surgeon: Raj Pierre MD;  Location: [facility] PAIN MGT;  Service: Pain Management;  Laterality: Bilateral;  [phone]  2 WK F/U ERIBERTO    INJECTION OF ANESTHETIC AGENT AROUND NERVE Bilateral 5/15/2025    Procedure: INJECTION, BILATERAL HIP;  Surgeon: Raj Pierre MD;  Location: [facility] PAIN MGT;  Service: Pain Management;  Laterality: Bilateral;  2 WK F/U ERIBERTO    INJECTION OF JOINT Bilateral 03/07/2024    Procedure: INJECTION, JOINT BILATERAL SI AND BILATERAL GTB;  Surgeon: Raj Pierre MD;  Location: [facility] PAIN MGT;  Service: Pain Management;  Laterality: Bilateral;  [phone]  2 WK F/U ERIBERTO    LASER LAPAROSCOPY  1988    LASIK Bilateral     MINI-LAPAROTOMY  1988    OOPHORECTOMY      TOTAL REDUCTION MAMMOPLASTY         Family History:  Family History   Problem Relation Name Age of Onset    Arthritis Mother Misa M. mckinley     Heart disease Mother Misa M. mckinley     Hypertension Mother Misa M. mckinley     Cataracts Mother Misa M. mckinley     Heart disease Father Sagar K. Mckinley     Hypertension Father Sagar K. Mckinley     Stroke Father Sagar K. Mckinley     Diabetes Sister Kenia NAZARIO. Rivera     Hypertension Sister Kenia NAZARIO. Rivera     Hypertension Sister Wendi Mckinley     Eczema Other niece     Arthritis Maternal Grandmother Luly Barr     Heart disease Maternal Grandmother Luly Barr     Hypertension Maternal Grandmother Luly Barr     Arthritis Maternal  Grandfather Italo Barr     Arthritis Paternal Grandmother Hailey DAILEY. Allen     Heart disease Paternal Grandmother Hailey DAILEY. Allen     Stroke Paternal Grandmother Hailey DAILEY. Allen     Arthritis Paternal Grandfather Aashish Allen     No Known Problems Brother      No Known Problems Maternal Aunt      No Known Problems Maternal Uncle      No Known Problems Paternal Aunt      No Known Problems Paternal Uncle      Ovarian cancer Neg Hx      Breast cancer Neg Hx      Anxiety disorder Neg Hx      Depression Neg Hx      Suicide Neg Hx      Amblyopia Neg Hx      Blindness Neg Hx      Cancer Neg Hx      Glaucoma Neg Hx      Macular degeneration Neg Hx      Retinal detachment Neg Hx      Strabismus Neg Hx      Thyroid disease Neg Hx      Colon cancer Neg Hx      Esophageal cancer Neg Hx         Social History:  Social History     Socioeconomic History    Marital status:     Number of children: 0   Occupational History    Occupation: RN     Employer: [employer]   Tobacco Use    Smoking status: Never     Passive exposure: Never    Smokeless tobacco: Never   Substance and Sexual Activity    Alcohol use: Not Currently     Comment: rare glass of wine  about 2 to 3 times/ year    Drug use: No    Sexual activity: Not Currently     Partners: Male     Birth control/protection: Post-menopausal, See Surgical Hx, None   Social History Narrative      several years ago    She has not been sexually-active since    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 [facility]     Social Drivers of Health     Financial Resource Strain: Low Risk  (4/3/2025)    Overall Financial Resource Strain (CARDIA)     Difficulty of Paying Living Expenses: Not hard at all   Food Insecurity: No Food Insecurity (4/3/2025)    Hunger Vital Sign     Worried About Running Out of Food in the Last Year: Never true     Ran Out of Food in the Last Year: Never true   Transportation Needs: No Transportation  "Needs (4/3/2025)    PRAPARE - Transportation     Lack of Transportation (Medical): No     Lack of Transportation (Non-Medical): No   Physical Activity: Sufficiently Active (4/3/2025)    Exercise Vital Sign     Days of Exercise per Week: 6 days     Minutes of Exercise per Session: 60 min   Recent Concern: Physical Activity - Insufficiently Active (2/11/2025)    Exercise Vital Sign     Days of Exercise per Week: 2 days     Minutes of Exercise per Session: 70 min   Stress: No Stress Concern Present (4/3/2025)    [employer] - Occupational Stress Questionnaire     Feeling of Stress : Not at all   Housing Stability: Low Risk  (4/3/2025)    Housing Stability Vital Sign     Unable to Pay for Housing in the Last Year: No     Number of Times Moved in the Last Year: 0     Homeless in the Last Year: No       OBJECTIVE:    BP (!) 183/74 (BP Location: Right arm, Patient Position: Sitting)   Pulse 103   Temp 97.9 °F (36.6 °C) (Oral)   Resp 18   Ht 5' 2" (1.575 m)   Wt 78 kg (171 lb 15.3 oz)   SpO2 100%   BMI 31.45 kg/m²     PHYSICAL EXAMINATION:  General appearance: Well appearing, in no acute distress, alert and appropriately communicative.  Psych:  Mood and affect appropriate.  Skin: Skin color, texture, turgor normal, no rashes or lesions, in both upper and lower body.  Head/face:  Atraumatic, normocephalic.  Cor: regular rate  Pulm: non-labored breathing  GI: Abdomen non-distended and non-tender.  Back: Straight leg raising in the sitting and supine positions is negative to radicular pain.  pain to palpation over the spine and/or paraspinal muscles. Pain with lumbar extension and facet loading.  +bilateral Yeomans, +bilateral Phoebe finger, +SI tenderness  Extremities: Peripheral joint ROM is full and pain free without obvious instability or laxity in all four extremities. No deformities, edema, or skin discoloration. Good capillary refill.  Musculoskeletal: No gross deformities on " inspection. Significant TTP over bilateral trochanteric bursa. .  Bilateral upper and lower extremity strength is normal and symmetric.  No atrophy or tone abnormalities are noted.  Neuro: Bilateral upper and lower extremity coordination and muscle stretch reflexes are physiologic and symmetric.  Negative Clonus. No loss of sensation is noted.  Gait: antalgic.    CMP  Sodium   Date Value Ref Range Status   02/17/2025 141 136 - 145 mmol/L Final     Potassium   Date Value Ref Range Status   02/17/2025 3.8 3.5 - 5.1 mmol/L Final     Chloride   Date Value Ref Range Status   02/17/2025 102 95 - 110 mmol/L Final     CO2   Date Value Ref Range Status   02/17/2025 28 23 - 29 mmol/L Final     Glucose   Date Value Ref Range Status   02/17/2025 99 70 - 110 mg/dL Final     BUN   Date Value Ref Range Status   02/17/2025 24 (H) 8 - 23 mg/dL Final     Creatinine   Date Value Ref Range Status   02/17/2025 1.6 (H) 0.5 - 1.4 mg/dL Final     Calcium   Date Value Ref Range Status   02/17/2025 10.3 8.7 - 10.5 mg/dL Final     Total Protein   Date Value Ref Range Status   02/17/2025 8.5 (H) 6.0 - 8.4 g/dL Final     Albumin   Date Value Ref Range Status   02/17/2025 3.8 3.5 - 5.2 g/dL Final     Total Bilirubin   Date Value Ref Range Status   02/17/2025 0.5 0.1 - 1.0 mg/dL Final     Comment:     For infants and newborns, interpretation of results should be based  on gestational age, weight and in agreement with clinical  observations.    Premature Infant recommended reference ranges:  Up to 24 hours.............<8.0 mg/dL  Up to 48 hours............<12.0 mg/dL  3-5 days..................<15.0 mg/dL  6-29 days.................<15.0 mg/dL       Alkaline Phosphatase   Date Value Ref Range Status   02/17/2025 71 40 - 150 U/L Final     AST   Date Value Ref Range Status   02/17/2025 20 10 - 40 U/L Final     ALT   Date Value Ref Range Status   02/17/2025 15 10 - 44 U/L Final     Anion Gap   Date Value Ref Range Status   02/17/2025 11 8 - 16 mmol/L  Final     eGFR   Date Value Ref Range Status   02/17/2025 35 (A) >60 mL/min/1.73 m^2 Final   11/21/2023 31 (L) > OR = 60 mL/min/1.73m2 Final       ASSESSMENT: 70 y.o. year old female with buttock/hip pain, consistent with:    1. Greater trochanteric bursitis, unspecified laterality        2. Myofascial pain syndrome        3. Degeneration of intervertebral disc of lumbar region with discogenic back pain and lower extremity pain          IMPRESSION: Riley Marcus presents today for bilateral lateral hip pain.  Symptoms are primarily related to greater trochanteric pain syndrome, bilateral hip arthropathy, and ischial bursitis bilaterally. Recent XR imaging reviewed with evidence of diffuse pelvic enthesopathy which is likely contributing to symptoms. She continues to have GTB bursitis which is worse than her hips. We will offer an intervention to help with this.    PLAN:   - I have stressed the importance of physical activity and a home exercise plan to help with pain and improve health.  - Patient can continue with medications for now since they are providing benefits, using them appropriately, and without side effects. The following recommendations to medication changes:   - Schedule patient for Bilateral GTB steroid injection in office  - we discussed her GTB blocks. She may have had benefit, but she feels she would like to try the bursa steroid injection again. GTB block was successful can proceed with cooled RF of bilateral greater trochanteric bursae.   - she may benefit from epidural injection if her radiating anterior thigh pain persists.  - we can consider a trigger point injection when she gets her GTB injections  - Continue physician prescribed HEP/HSP.   - Counseled patient regarding the importance of activity modification and physical therapy.  - f/u 2 weeks after intervention    The above plan and management options were discussed at length with patient. Patient is in agreement with the above and  verbalized understanding.    Raj Pierre MD PhD

## 2025-05-30 ENCOUNTER — TELEPHONE (OUTPATIENT)
Dept: PAIN MEDICINE | Facility: CLINIC | Age: 70
End: 2025-05-30
Payer: MEDICARE

## 2025-05-30 DIAGNOSIS — M70.60 GREATER TROCHANTERIC BURSITIS, UNSPECIFIED LATERALITY: Primary | ICD-10-CM

## 2025-06-04 ENCOUNTER — LAB VISIT (OUTPATIENT)
Dept: LAB | Facility: HOSPITAL | Age: 70
End: 2025-06-04
Attending: INTERNAL MEDICINE
Payer: MEDICARE

## 2025-06-04 DIAGNOSIS — M10.9 GOUT, UNSPECIFIED CAUSE, UNSPECIFIED CHRONICITY, UNSPECIFIED SITE: ICD-10-CM

## 2025-06-04 DIAGNOSIS — N18.32 STAGE 3B CHRONIC KIDNEY DISEASE: ICD-10-CM

## 2025-06-04 DIAGNOSIS — D50.9 IRON DEFICIENCY ANEMIA, UNSPECIFIED IRON DEFICIENCY ANEMIA TYPE: ICD-10-CM

## 2025-06-04 DIAGNOSIS — I10 PRIMARY HYPERTENSION: ICD-10-CM

## 2025-06-04 DIAGNOSIS — E79.0 HYPERURICEMIA: ICD-10-CM

## 2025-06-04 DIAGNOSIS — R80.9 MICROALBUMINURIA: ICD-10-CM

## 2025-06-04 DIAGNOSIS — N18.30 STAGE 3 CHRONIC KIDNEY DISEASE, UNSPECIFIED WHETHER STAGE 3A OR 3B CKD: ICD-10-CM

## 2025-06-04 DIAGNOSIS — R80.1 PERSISTENT PROTEINURIA: ICD-10-CM

## 2025-06-04 LAB
ABSOLUTE EOSINOPHIL (OHS): 0.11 K/UL
ABSOLUTE MONOCYTE (OHS): 0.41 K/UL (ref 0.3–1)
ABSOLUTE NEUTROPHIL COUNT (OHS): 3.28 K/UL (ref 1.8–7.7)
ALBUMIN SERPL BCP-MCNC: 3.5 G/DL (ref 3.5–5.2)
ALP SERPL-CCNC: 65 UNIT/L (ref 40–150)
ALT SERPL W/O P-5'-P-CCNC: 26 UNIT/L (ref 10–44)
ANION GAP (OHS): 9 MMOL/L (ref 8–16)
AST SERPL-CCNC: 19 UNIT/L (ref 11–45)
BACTERIA #/AREA URNS AUTO: ABNORMAL /HPF
BASOPHILS # BLD AUTO: 0.04 K/UL
BASOPHILS NFR BLD AUTO: 0.8 %
BILIRUB SERPL-MCNC: 0.5 MG/DL (ref 0.1–1)
BILIRUB UR QL STRIP.AUTO: NEGATIVE
BUN SERPL-MCNC: 26 MG/DL (ref 8–23)
CALCIUM SERPL-MCNC: 9.9 MG/DL (ref 8.7–10.5)
CHLORIDE SERPL-SCNC: 104 MMOL/L (ref 95–110)
CHOLEST SERPL-MCNC: 194 MG/DL (ref 120–199)
CHOLEST/HDLC SERPL: 3.2 {RATIO} (ref 2–5)
CLARITY UR: CLEAR
CO2 SERPL-SCNC: 27 MMOL/L (ref 23–29)
COLOR UR AUTO: YELLOW
CREAT SERPL-MCNC: 1.8 MG/DL (ref 0.5–1.4)
CREAT UR-MCNC: 316.8 MG/DL (ref 15–325)
ERYTHROCYTE [DISTWIDTH] IN BLOOD BY AUTOMATED COUNT: 13.2 % (ref 11.5–14.5)
GFR SERPLBLD CREATININE-BSD FMLA CKD-EPI: 30 ML/MIN/1.73/M2
GLUCOSE SERPL-MCNC: 99 MG/DL (ref 70–110)
GLUCOSE UR QL STRIP: NEGATIVE
HCT VFR BLD AUTO: 39.8 % (ref 37–48.5)
HDLC SERPL-MCNC: 60 MG/DL (ref 40–75)
HDLC SERPL: 30.9 % (ref 20–50)
HGB BLD-MCNC: 12.8 GM/DL (ref 12–16)
HGB UR QL STRIP: NEGATIVE
HYALINE CASTS UR QL AUTO: 7 /LPF (ref 0–1)
IMM GRANULOCYTES # BLD AUTO: 0.02 K/UL (ref 0–0.04)
IMM GRANULOCYTES NFR BLD AUTO: 0.4 % (ref 0–0.5)
KETONES UR QL STRIP: NEGATIVE
LDLC SERPL CALC-MCNC: 119 MG/DL (ref 63–159)
LEUKOCYTE ESTERASE UR QL STRIP: NEGATIVE
LYMPHOCYTES # BLD AUTO: 1.13 K/UL (ref 1–4.8)
MAGNESIUM SERPL-MCNC: 2.3 MG/DL (ref 1.6–2.6)
MCH RBC QN AUTO: 30.5 PG (ref 27–31)
MCHC RBC AUTO-ENTMCNC: 32.2 G/DL (ref 32–36)
MCV RBC AUTO: 95 FL (ref 82–98)
MICROSCOPIC COMMENT: ABNORMAL
NITRITE UR QL STRIP: NEGATIVE
NONHDLC SERPL-MCNC: 134 MG/DL
NUCLEATED RBC (/100WBC) (OHS): 0 /100 WBC
PH UR STRIP: 6 [PH]
PHOSPHATE SERPL-MCNC: 3.5 MG/DL (ref 2.7–4.5)
PLATELET # BLD AUTO: 197 K/UL (ref 150–450)
PMV BLD AUTO: 11.5 FL (ref 9.2–12.9)
POTASSIUM SERPL-SCNC: 4.8 MMOL/L (ref 3.5–5.1)
PROT SERPL-MCNC: 7.7 GM/DL (ref 6–8.4)
PROT UR QL STRIP: ABNORMAL
PROT UR-MCNC: 99 MG/DL
PROT/CREAT UR: 0.31 MG/G{CREAT}
PTH-INTACT SERPL-MCNC: 133.2 PG/ML (ref 9–77)
RBC # BLD AUTO: 4.2 M/UL (ref 4–5.4)
RBC #/AREA URNS AUTO: 0 /HPF (ref 0–4)
RELATIVE EOSINOPHIL (OHS): 2.2 %
RELATIVE LYMPHOCYTE (OHS): 22.6 % (ref 18–48)
RELATIVE MONOCYTE (OHS): 8.2 % (ref 4–15)
RELATIVE NEUTROPHIL (OHS): 65.8 % (ref 38–73)
SODIUM SERPL-SCNC: 140 MMOL/L (ref 136–145)
SP GR UR STRIP: 1.02
SQUAMOUS #/AREA URNS AUTO: 3 /HPF
TRIGL SERPL-MCNC: 75 MG/DL (ref 30–150)
URATE SERPL-MCNC: 8.1 MG/DL (ref 2.4–5.7)
UROBILINOGEN UR STRIP-ACNC: NEGATIVE EU/DL
WBC # BLD AUTO: 4.99 K/UL (ref 3.9–12.7)
WBC #/AREA URNS AUTO: 0 /HPF (ref 0–5)

## 2025-06-04 PROCEDURE — 80053 COMPREHEN METABOLIC PANEL: CPT

## 2025-06-04 PROCEDURE — 83735 ASSAY OF MAGNESIUM: CPT

## 2025-06-04 PROCEDURE — 84550 ASSAY OF BLOOD/URIC ACID: CPT

## 2025-06-04 PROCEDURE — 84100 ASSAY OF PHOSPHORUS: CPT

## 2025-06-04 PROCEDURE — 36415 COLL VENOUS BLD VENIPUNCTURE: CPT

## 2025-06-04 PROCEDURE — 84156 ASSAY OF PROTEIN URINE: CPT

## 2025-06-04 PROCEDURE — 83970 ASSAY OF PARATHORMONE: CPT

## 2025-06-04 PROCEDURE — 85025 COMPLETE CBC W/AUTO DIFF WBC: CPT

## 2025-06-04 PROCEDURE — 80061 LIPID PANEL: CPT

## 2025-06-04 PROCEDURE — 81001 URINALYSIS AUTO W/SCOPE: CPT

## 2025-06-04 PROCEDURE — 83036 HEMOGLOBIN GLYCOSYLATED A1C: CPT

## 2025-06-05 LAB
EAG (OHS): 120 MG/DL (ref 68–131)
HBA1C MFR BLD: 5.8 % (ref 4–5.6)

## 2025-06-09 ENCOUNTER — PROCEDURE VISIT (OUTPATIENT)
Dept: PAIN MEDICINE | Facility: CLINIC | Age: 70
End: 2025-06-09
Payer: MEDICARE

## 2025-06-09 VITALS
OXYGEN SATURATION: 98 % | TEMPERATURE: 98 F | HEART RATE: 64 BPM | SYSTOLIC BLOOD PRESSURE: 151 MMHG | DIASTOLIC BLOOD PRESSURE: 89 MMHG | BODY MASS INDEX: 32.02 KG/M2 | WEIGHT: 175.06 LBS

## 2025-06-09 DIAGNOSIS — M70.62 GREATER TROCHANTERIC BURSITIS OF BOTH HIPS: Primary | ICD-10-CM

## 2025-06-09 DIAGNOSIS — M70.61 GREATER TROCHANTERIC BURSITIS OF BOTH HIPS: Primary | ICD-10-CM

## 2025-06-09 PROCEDURE — 20611 DRAIN/INJ JOINT/BURSA W/US: CPT | Mod: 50,S$GLB,, | Performed by: STUDENT IN AN ORGANIZED HEALTH CARE EDUCATION/TRAINING PROGRAM

## 2025-06-09 PROCEDURE — 99499 UNLISTED E&M SERVICE: CPT | Mod: S$GLB,,, | Performed by: STUDENT IN AN ORGANIZED HEALTH CARE EDUCATION/TRAINING PROGRAM

## 2025-06-09 RX ORDER — TRIAMCINOLONE ACETONIDE 40 MG/ML
40 INJECTION, SUSPENSION INTRA-ARTICULAR; INTRAMUSCULAR
Status: COMPLETED | OUTPATIENT
Start: 2025-06-09 | End: 2025-06-09

## 2025-06-09 RX ADMIN — TRIAMCINOLONE ACETONIDE 40 MG: 40 INJECTION, SUSPENSION INTRA-ARTICULAR; INTRAMUSCULAR at 04:06

## 2025-06-09 NOTE — PROCEDURES
Patient Name: Riley Marcus  MRN: [id number]    INFORMED CONSENT: The procedure, risks, benefits and options were discussed with patient. There are no contraindications to the procedure. The patient expressed understanding and agreed to proceed. The personnel performing the procedure was discussed. I verify that I personally obtained Riley's consent prior to the start of the procedure and the signed consent can be found on the patient's chart.    Procedure Date: 06/09/2025    Anesthesia: Topical    Pre Procedure diagnosis:   1. Greater trochanteric bursitis of both hips        Post-Procedure diagnosis: same    Medication mixture: 7 cc of Bupivacaine 0.25% and 40 mg kenalog     Sedation: None    PROCEDURE: Bilateral GREATER TROCHANTERIC BURSA INJECTION under ultrasound guidance      DESCRIPTION OF PROCEDURE: The patient was brought to the procedure room. . The patient was positioned lateral position on table. . . The skin overlying the left greater trochanter was prepped with povidone-iodine three times and draped in a sterile fashion.  A  22 gauge 3.5 inch spinal needle was slowly advanced through under ultrasound guidance into the left greater trochanteric bursa. . Negative aspiration was confirmed.  4 cc of the medication mixture listed above was easily injected. The needle was removed and bleeding was nil. A sterile dressing was applied. same was repeated on the contralateral side. Patient tolerated procedure with no complications     Blood Loss: Nill  Specimen: None    Ramez Waller MD    I reviewed and edited the fellow/resident/student/provider's note. I conducted my own interview and physical examination. I agree with the findings. I was present and supervising all critical portions of the procedure.    Raj Pierre MD PhD

## 2025-07-11 ENCOUNTER — OFFICE VISIT (OUTPATIENT)
Dept: OPTOMETRY | Facility: CLINIC | Age: 70
End: 2025-07-11
Payer: MEDICARE

## 2025-07-11 DIAGNOSIS — H25.13 NUCLEAR SCLEROSIS OF BOTH EYES: Primary | ICD-10-CM

## 2025-07-11 DIAGNOSIS — H52.7 REFRACTIVE ERROR: ICD-10-CM

## 2025-07-11 PROCEDURE — 99999 PR PBB SHADOW E&M-EST. PATIENT-LVL IV: CPT | Mod: PBBFAC,,, | Performed by: OPTOMETRIST

## 2025-07-11 NOTE — PROGRESS NOTES
Subjective:       Patient ID: Riley Marcus is a 70 y.o. female      Chief Complaint   Patient presents with    Concerns About Ocular Health     History of Present Illness   DLS: 4/18/24 ()    Pt here for routine exam  Pt states she has been having double vision and states it goes away and then comes back. Pt states it is mostly when she is reading   (-) pain (+) headaches - occasional (-) floaters (-) flashes     1. OHT OU  2, ZOFIA  3. NS OU  4. Presbyopia    MEDS:  AT's PRN OU       Assessment/Plan:     1. Nuclear sclerosis of both eyes (Primary)  Visually significant cataract. Discussed diagnosis with patient and surgical process. Referral for cataract evaluation.     - Ambulatory referral/consult to Ophthalmology    2. Refractive error  Patient referred for cataract surgery, advised to hold on new glasses as prescription will change after cataract surgery.       Follow up for cataract consult.

## 2025-07-16 DIAGNOSIS — N18.32 STAGE 3B CHRONIC KIDNEY DISEASE: ICD-10-CM

## 2025-08-01 ENCOUNTER — OFFICE VISIT (OUTPATIENT)
Dept: OPHTHALMOLOGY | Facility: CLINIC | Age: 70
End: 2025-08-01
Payer: MEDICARE

## 2025-08-01 DIAGNOSIS — H26.9 CORTICAL CATARACT OF BOTH EYES: ICD-10-CM

## 2025-08-01 DIAGNOSIS — H25.13 NUCLEAR SCLEROSIS OF BOTH EYES: Primary | ICD-10-CM

## 2025-08-01 DIAGNOSIS — Z98.890 HX OF LASIK: ICD-10-CM

## 2025-08-01 DIAGNOSIS — I10 PRIMARY HYPERTENSION: ICD-10-CM

## 2025-08-01 PROCEDURE — 99999 PR PBB SHADOW E&M-EST. PATIENT-LVL II: CPT | Mod: PBBFAC,,, | Performed by: OPHTHALMOLOGY

## 2025-08-01 NOTE — PROGRESS NOTES
Subjective:       Patient ID: Riley Marcus is a 70 y.o. female.    Chief Complaint: Cataract    HPI    Here for cataract evaluation per Dr Bloom    Eye medS: refresh ou prn     70 year old female states vision is blurry and foggy OU. C/o seeing glare   and bright lights at night. Reports she had lasik OU about 20 years ago.      POHx:   S/P Avastin OS x 4 (05/28/2018)   1. BRVO OS w/ ME   2.Lattice OS    Last edited by Vilma Salvador on 8/1/2025  3:37 PM.             Assessment:       1. Nuclear sclerosis of both eyes    2. Cortical cataract of both eyes    3. Primary hypertension    4. Hx of LASIK        Plan:       Visually significant cataract OU -Pt. Wants Sx.    HTN-No retinopathy OU.  Hx/o Myopic LASIK OU-Stable.      Cataract Surgery Consent: Patient with a visually significant cataract with difficulties of ADLs, reading, driving, night vision, glare (any and all).  Discussed with Patient/Family/Caregiver: options, risks and benefits, expectations of cataract surgery, utilized an eye model with questions and answers to facilitate discussion.  Discussed lens options and patient understands that glasses may be required for optimal vision for distance and/or near vision after cataract surgery.  The Patient/Family/Caregiver  voice good understanding and patient wishes to proceed with surgery.  The patient will likely benefit from surgery and patient signed consent for Right Eye.  CE OD 10/9/25 CNAOTO 21.0,        OS 10/23/25 CNAOTO 21.5.  Control HTN.

## 2025-08-04 ENCOUNTER — TELEPHONE (OUTPATIENT)
Dept: OPHTHALMOLOGY | Facility: CLINIC | Age: 70
End: 2025-08-04
Payer: MEDICARE

## 2025-08-04 DIAGNOSIS — H25.11 NS (NUCLEAR SCLEROSIS), RIGHT: Primary | ICD-10-CM

## 2025-08-05 ENCOUNTER — TELEPHONE (OUTPATIENT)
Dept: OPHTHALMOLOGY | Facility: CLINIC | Age: 70
End: 2025-08-05
Payer: MEDICARE

## 2025-08-05 DIAGNOSIS — H25.12 NS (NUCLEAR SCLEROSIS), LEFT: Primary | ICD-10-CM

## 2025-08-27 DIAGNOSIS — H25.13 NUCLEAR SCLEROSIS OF BOTH EYES: Primary | ICD-10-CM

## 2025-08-27 RX ORDER — PREDNISOLONE/MOXIFLOX/BROMFEN 1 %-0.5 %
1 SUSPENSION, DROPS(FINAL DOSAGE FORM)(ML) OPHTHALMIC (EYE) 3 TIMES DAILY
Qty: 8 ML | Refills: 2 | Status: SHIPPED | OUTPATIENT
Start: 2025-10-06